# Patient Record
Sex: MALE | Race: WHITE | NOT HISPANIC OR LATINO | Employment: OTHER | URBAN - METROPOLITAN AREA
[De-identification: names, ages, dates, MRNs, and addresses within clinical notes are randomized per-mention and may not be internally consistent; named-entity substitution may affect disease eponyms.]

---

## 2019-05-24 ENCOUNTER — OFFICE VISIT CONVERTED (OUTPATIENT)
Dept: CARDIOLOGY | Facility: CLINIC | Age: 64
End: 2019-05-24
Attending: SPECIALIST

## 2019-05-24 ENCOUNTER — CONVERSION ENCOUNTER (OUTPATIENT)
Dept: CARDIOLOGY | Facility: CLINIC | Age: 64
End: 2019-05-24

## 2019-09-30 ENCOUNTER — HOSPITAL ENCOUNTER (INPATIENT)
Facility: HOSPITAL | Age: 64
End: 2019-09-30
Attending: INTERNAL MEDICINE | Admitting: INTERNAL MEDICINE

## 2019-09-30 ENCOUNTER — HOSPITAL ENCOUNTER (INPATIENT)
Facility: HOSPITAL | Age: 64
LOS: 3 days | Discharge: HOME HEALTH | End: 2019-10-03
Attending: INTERNAL MEDICINE | Admitting: INTERNAL MEDICINE

## 2019-09-30 DIAGNOSIS — G45.9 TIA (TRANSIENT ISCHEMIC ATTACK): Primary | ICD-10-CM

## 2019-09-30 DIAGNOSIS — E11.65 TYPE 2 DIABETES MELLITUS WITH HYPERGLYCEMIA, WITHOUT LONG-TERM CURRENT USE OF INSULIN (HCC): Chronic | ICD-10-CM

## 2019-09-30 DIAGNOSIS — R51.9 LEFT TEMPORAL HEADACHE: ICD-10-CM

## 2019-09-30 PROBLEM — E11.9 DIABETES MELLITUS (HCC): Status: ACTIVE | Noted: 2019-09-30

## 2019-09-30 PROBLEM — R27.0 ATAXIA: Status: ACTIVE | Noted: 2019-09-30

## 2019-09-30 PROBLEM — Z98.890 HISTORY OF CAROTID ENDARTERECTOMY: Status: ACTIVE | Noted: 2019-09-30

## 2019-09-30 PROBLEM — I50.9 CHF (CONGESTIVE HEART FAILURE) (HCC): Status: ACTIVE | Noted: 2019-09-30

## 2019-09-30 PROBLEM — I63.9 STROKE (HCC): Status: ACTIVE | Noted: 2019-09-30

## 2019-09-30 LAB
ALBUMIN SERPL-MCNC: 3.6 G/DL (ref 3.5–5.2)
ALBUMIN/GLOB SERPL: 1.2 G/DL
ALP SERPL-CCNC: 87 U/L (ref 39–117)
ALT SERPL W P-5'-P-CCNC: 8 U/L (ref 1–41)
ANION GAP SERPL CALCULATED.3IONS-SCNC: 10 MMOL/L (ref 5–15)
AST SERPL-CCNC: 15 U/L (ref 1–40)
BILIRUB SERPL-MCNC: 0.3 MG/DL (ref 0.2–1.2)
BUN BLD-MCNC: 11 MG/DL (ref 8–23)
BUN/CREAT SERPL: 12.5 (ref 7–25)
CALCIUM SPEC-SCNC: 8.2 MG/DL (ref 8.6–10.5)
CHLORIDE SERPL-SCNC: 99 MMOL/L (ref 98–107)
CO2 SERPL-SCNC: 29 MMOL/L (ref 22–29)
CREAT BLD-MCNC: 0.88 MG/DL (ref 0.76–1.27)
CRP SERPL-MCNC: 0.12 MG/DL (ref 0–0.5)
DEPRECATED RDW RBC AUTO: 45.7 FL (ref 37–54)
ERYTHROCYTE [DISTWIDTH] IN BLOOD BY AUTOMATED COUNT: 13.4 % (ref 12.3–15.4)
ERYTHROCYTE [SEDIMENTATION RATE] IN BLOOD: 12 MM/HR (ref 0–20)
GFR SERPL CREATININE-BSD FRML MDRD: 87 ML/MIN/1.73
GLOBULIN UR ELPH-MCNC: 2.9 GM/DL
GLUCOSE BLD-MCNC: 206 MG/DL (ref 65–99)
GLUCOSE BLDC GLUCOMTR-MCNC: 213 MG/DL (ref 70–130)
GLUCOSE BLDC GLUCOMTR-MCNC: 230 MG/DL (ref 70–130)
HCT VFR BLD AUTO: 42.5 % (ref 37.5–51)
HGB BLD-MCNC: 14.3 G/DL (ref 13–17.7)
INR PPP: 1.02 (ref 0.9–1.1)
MCH RBC QN AUTO: 31 PG (ref 26.6–33)
MCHC RBC AUTO-ENTMCNC: 33.6 G/DL (ref 31.5–35.7)
MCV RBC AUTO: 92 FL (ref 79–97)
PLATELET # BLD AUTO: 136 10*3/MM3 (ref 140–450)
PMV BLD AUTO: 10.4 FL (ref 6–12)
POTASSIUM BLD-SCNC: 4.1 MMOL/L (ref 3.5–5.2)
PROT SERPL-MCNC: 6.5 G/DL (ref 6–8.5)
PROTHROMBIN TIME: 13.1 SECONDS (ref 11.7–14.2)
RBC # BLD AUTO: 4.62 10*6/MM3 (ref 4.14–5.8)
SODIUM BLD-SCNC: 138 MMOL/L (ref 136–145)
WBC NRBC COR # BLD: 9.73 10*3/MM3 (ref 3.4–10.8)

## 2019-09-30 PROCEDURE — 86140 C-REACTIVE PROTEIN: CPT | Performed by: INTERNAL MEDICINE

## 2019-09-30 PROCEDURE — G0378 HOSPITAL OBSERVATION PER HR: HCPCS

## 2019-09-30 PROCEDURE — 82962 GLUCOSE BLOOD TEST: CPT

## 2019-09-30 PROCEDURE — 85610 PROTHROMBIN TIME: CPT | Performed by: INTERNAL MEDICINE

## 2019-09-30 PROCEDURE — 93005 ELECTROCARDIOGRAM TRACING: CPT | Performed by: INTERNAL MEDICINE

## 2019-09-30 PROCEDURE — 99205 OFFICE O/P NEW HI 60 MIN: CPT | Performed by: PSYCHIATRY & NEUROLOGY

## 2019-09-30 PROCEDURE — 63710000001 PREDNISONE PER 5 MG: Performed by: PSYCHIATRY & NEUROLOGY

## 2019-09-30 PROCEDURE — 85652 RBC SED RATE AUTOMATED: CPT | Performed by: INTERNAL MEDICINE

## 2019-09-30 PROCEDURE — 80053 COMPREHEN METABOLIC PANEL: CPT | Performed by: INTERNAL MEDICINE

## 2019-09-30 PROCEDURE — 93010 ELECTROCARDIOGRAM REPORT: CPT | Performed by: INTERNAL MEDICINE

## 2019-09-30 PROCEDURE — 85027 COMPLETE CBC AUTOMATED: CPT | Performed by: INTERNAL MEDICINE

## 2019-09-30 RX ORDER — DULOXETIN HYDROCHLORIDE 60 MG/1
60 CAPSULE, DELAYED RELEASE ORAL NIGHTLY
Status: DISCONTINUED | OUTPATIENT
Start: 2019-09-30 | End: 2019-10-03 | Stop reason: HOSPADM

## 2019-09-30 RX ORDER — ASPIRIN 300 MG/1
300 SUPPOSITORY RECTAL DAILY
Status: DISCONTINUED | OUTPATIENT
Start: 2019-10-01 | End: 2019-10-02

## 2019-09-30 RX ORDER — GABAPENTIN 400 MG/1
400 CAPSULE ORAL 3 TIMES DAILY
COMMUNITY

## 2019-09-30 RX ORDER — ACETAMINOPHEN 325 MG/1
650 TABLET ORAL EVERY 4 HOURS PRN
Status: DISCONTINUED | OUTPATIENT
Start: 2019-09-30 | End: 2019-10-03 | Stop reason: HOSPADM

## 2019-09-30 RX ORDER — ACETAMINOPHEN 650 MG/1
650 SUPPOSITORY RECTAL EVERY 4 HOURS PRN
Status: DISCONTINUED | OUTPATIENT
Start: 2019-09-30 | End: 2019-10-03 | Stop reason: HOSPADM

## 2019-09-30 RX ORDER — ONDANSETRON 2 MG/ML
4 INJECTION INTRAMUSCULAR; INTRAVENOUS EVERY 6 HOURS PRN
Status: DISCONTINUED | OUTPATIENT
Start: 2019-09-30 | End: 2019-10-03 | Stop reason: HOSPADM

## 2019-09-30 RX ORDER — SODIUM CHLORIDE 0.9 % (FLUSH) 0.9 %
10 SYRINGE (ML) INJECTION EVERY 12 HOURS SCHEDULED
Status: DISCONTINUED | OUTPATIENT
Start: 2019-09-30 | End: 2019-10-03 | Stop reason: HOSPADM

## 2019-09-30 RX ORDER — GABAPENTIN 600 MG/1
400 TABLET ORAL 3 TIMES DAILY
Status: ON HOLD | COMMUNITY
End: 2019-09-30

## 2019-09-30 RX ORDER — ATORVASTATIN CALCIUM 80 MG/1
80 TABLET, FILM COATED ORAL NIGHTLY
Status: DISCONTINUED | OUTPATIENT
Start: 2019-09-30 | End: 2019-10-03 | Stop reason: HOSPADM

## 2019-09-30 RX ORDER — ASPIRIN 81 MG/1
81 TABLET, CHEWABLE ORAL DAILY
Status: DISCONTINUED | OUTPATIENT
Start: 2019-10-01 | End: 2019-10-02

## 2019-09-30 RX ORDER — OXYCODONE AND ACETAMINOPHEN 7.5; 325 MG/1; MG/1
1 TABLET ORAL 2 TIMES DAILY
Status: DISCONTINUED | OUTPATIENT
Start: 2019-09-30 | End: 2019-10-03 | Stop reason: HOSPADM

## 2019-09-30 RX ORDER — FAMOTIDINE 20 MG/1
20 TABLET, FILM COATED ORAL
Status: DISCONTINUED | OUTPATIENT
Start: 2019-09-30 | End: 2019-10-03 | Stop reason: HOSPADM

## 2019-09-30 RX ORDER — NICOTINE POLACRILEX 4 MG
15 LOZENGE BUCCAL
Status: DISCONTINUED | OUTPATIENT
Start: 2019-09-30 | End: 2019-10-03 | Stop reason: HOSPADM

## 2019-09-30 RX ORDER — OXYCODONE AND ACETAMINOPHEN 7.5; 325 MG/1; MG/1
1 TABLET ORAL 2 TIMES DAILY
COMMUNITY

## 2019-09-30 RX ORDER — GABAPENTIN 400 MG/1
400 CAPSULE ORAL 3 TIMES DAILY
Status: DISCONTINUED | OUTPATIENT
Start: 2019-09-30 | End: 2019-10-03 | Stop reason: HOSPADM

## 2019-09-30 RX ORDER — SODIUM CHLORIDE 0.9 % (FLUSH) 0.9 %
10 SYRINGE (ML) INJECTION AS NEEDED
Status: DISCONTINUED | OUTPATIENT
Start: 2019-09-30 | End: 2019-10-03 | Stop reason: HOSPADM

## 2019-09-30 RX ORDER — DEXTROSE MONOHYDRATE 25 G/50ML
25 INJECTION, SOLUTION INTRAVENOUS
Status: DISCONTINUED | OUTPATIENT
Start: 2019-09-30 | End: 2019-10-03 | Stop reason: HOSPADM

## 2019-09-30 RX ORDER — DULOXETIN HYDROCHLORIDE 60 MG/1
60 CAPSULE, DELAYED RELEASE ORAL NIGHTLY
COMMUNITY

## 2019-09-30 RX ADMIN — SODIUM CHLORIDE, PRESERVATIVE FREE 10 ML: 5 INJECTION INTRAVENOUS at 20:09

## 2019-09-30 RX ADMIN — GABAPENTIN 400 MG: 400 CAPSULE ORAL at 22:21

## 2019-09-30 RX ADMIN — FAMOTIDINE 20 MG: 20 TABLET, FILM COATED ORAL at 20:09

## 2019-09-30 RX ADMIN — ATORVASTATIN CALCIUM 80 MG: 80 TABLET, FILM COATED ORAL at 20:09

## 2019-09-30 RX ADMIN — OXYCODONE HYDROCHLORIDE AND ACETAMINOPHEN 1 TABLET: 7.5; 325 TABLET ORAL at 22:21

## 2019-09-30 RX ADMIN — PREDNISONE 60 MG: 50 TABLET ORAL at 20:09

## 2019-09-30 RX ADMIN — ACETAMINOPHEN 650 MG: 325 TABLET, FILM COATED ORAL at 20:14

## 2019-10-01 ENCOUNTER — APPOINTMENT (OUTPATIENT)
Dept: MRI IMAGING | Facility: HOSPITAL | Age: 64
End: 2019-10-01

## 2019-10-01 ENCOUNTER — APPOINTMENT (OUTPATIENT)
Dept: CARDIOLOGY | Facility: HOSPITAL | Age: 64
End: 2019-10-01

## 2019-10-01 ENCOUNTER — APPOINTMENT (OUTPATIENT)
Dept: CT IMAGING | Facility: HOSPITAL | Age: 64
End: 2019-10-01

## 2019-10-01 PROBLEM — I63.81 ACUTE LACUNAR STROKE (HCC): Status: ACTIVE | Noted: 2019-10-01

## 2019-10-01 PROBLEM — R51.9 LEFT TEMPORAL HEADACHE: Status: ACTIVE | Noted: 2019-10-01

## 2019-10-01 PROBLEM — K43.9 HERNIA OF ANTERIOR ABDOMINAL WALL: Status: ACTIVE | Noted: 2019-10-01

## 2019-10-01 PROBLEM — E83.51 HYPOCALCEMIA: Status: ACTIVE | Noted: 2019-10-01

## 2019-10-01 PROBLEM — E53.8 B12 DEFICIENCY: Status: ACTIVE | Noted: 2019-10-01

## 2019-10-01 PROBLEM — I25.10 CAD (CORONARY ARTERY DISEASE): Chronic | Status: ACTIVE | Noted: 2019-10-01

## 2019-10-01 PROBLEM — E11.65 TYPE 2 DIABETES MELLITUS WITH HYPERGLYCEMIA, WITHOUT LONG-TERM CURRENT USE OF INSULIN (HCC): Chronic | Status: ACTIVE | Noted: 2019-10-01

## 2019-10-01 LAB
AORTIC DIMENSIONLESS INDEX: 1 (DI)
BH CV ECHO MEAS - AO MAX PG: 5.4 MMHG
BH CV ECHO MEAS - AO MEAN PG (FULL): 1 MMHG
BH CV ECHO MEAS - AO MEAN PG: 3 MMHG
BH CV ECHO MEAS - AO ROOT AREA (BSA CORRECTED): 1.7
BH CV ECHO MEAS - AO ROOT AREA: 9.6 CM^2
BH CV ECHO MEAS - AO ROOT DIAM: 3.5 CM
BH CV ECHO MEAS - AO V2 MAX: 115 CM/SEC
BH CV ECHO MEAS - AO V2 MEAN: 78.7 CM/SEC
BH CV ECHO MEAS - AO V2 VTI: 20.2 CM
BH CV ECHO MEAS - AVA(I,A): 3.6 CM^2
BH CV ECHO MEAS - AVA(I,D): 3.6 CM^2
BH CV ECHO MEAS - BSA(HAYCOCK): 2.1 M^2
BH CV ECHO MEAS - BSA: 2 M^2
BH CV ECHO MEAS - BZI_BMI: 27.8 KILOGRAMS/M^2
BH CV ECHO MEAS - BZI_METRIC_HEIGHT: 177 CM
BH CV ECHO MEAS - BZI_METRIC_WEIGHT: 87 KG
BH CV ECHO MEAS - EDV(CUBED): 68.9 ML
BH CV ECHO MEAS - EDV(MOD-SP2): 25 ML
BH CV ECHO MEAS - EDV(MOD-SP4): 51 ML
BH CV ECHO MEAS - EDV(TEICH): 74.2 ML
BH CV ECHO MEAS - EF(CUBED): 56.8 %
BH CV ECHO MEAS - EF(MOD-BP): 56 %
BH CV ECHO MEAS - EF(MOD-SP2): 56 %
BH CV ECHO MEAS - EF(MOD-SP4): 58.8 %
BH CV ECHO MEAS - EF(TEICH): 48.9 %
BH CV ECHO MEAS - ESV(CUBED): 29.8 ML
BH CV ECHO MEAS - ESV(MOD-SP2): 11 ML
BH CV ECHO MEAS - ESV(MOD-SP4): 21 ML
BH CV ECHO MEAS - ESV(TEICH): 37.9 ML
BH CV ECHO MEAS - FS: 24.4 %
BH CV ECHO MEAS - IVS/LVPW: 0.92
BH CV ECHO MEAS - IVSD: 1.1 CM
BH CV ECHO MEAS - LAT PEAK E' VEL: 12.9 CM/SEC
BH CV ECHO MEAS - LV DIASTOLIC VOL/BSA (35-75): 25 ML/M^2
BH CV ECHO MEAS - LV MASS(C)D: 161.4 GRAMS
BH CV ECHO MEAS - LV MASS(C)DI: 79 GRAMS/M^2
BH CV ECHO MEAS - LV MAX PG: 4.2 MMHG
BH CV ECHO MEAS - LV MEAN PG: 2 MMHG
BH CV ECHO MEAS - LV SYSTOLIC VOL/BSA (12-30): 10.3 ML/M^2
BH CV ECHO MEAS - LV V1 MAX: 103 CM/SEC
BH CV ECHO MEAS - LV V1 MEAN: 66.6 CM/SEC
BH CV ECHO MEAS - LV V1 VTI: 20.8 CM
BH CV ECHO MEAS - LVIDD: 4.1 CM
BH CV ECHO MEAS - LVIDS: 3.1 CM
BH CV ECHO MEAS - LVLD AP2: 6.6 CM
BH CV ECHO MEAS - LVLD AP4: 7.8 CM
BH CV ECHO MEAS - LVLS AP2: 5.1 CM
BH CV ECHO MEAS - LVLS AP4: 6.5 CM
BH CV ECHO MEAS - LVOT AREA (M): 3.5 CM^2
BH CV ECHO MEAS - LVOT AREA: 3.5 CM^2
BH CV ECHO MEAS - LVOT DIAM: 2.1 CM
BH CV ECHO MEAS - LVPWD: 1.2 CM
BH CV ECHO MEAS - MED PEAK E' VEL: 6.8 CM/SEC
BH CV ECHO MEAS - MV A DUR: 134 SEC
BH CV ECHO MEAS - MV A MAX VEL: 111 CM/SEC
BH CV ECHO MEAS - MV DEC SLOPE: 401 CM/SEC^2
BH CV ECHO MEAS - MV DEC TIME: 148 SEC
BH CV ECHO MEAS - MV E MAX VEL: 83.9 CM/SEC
BH CV ECHO MEAS - MV E/A: 0.76
BH CV ECHO MEAS - MV MEAN PG: 1 MMHG
BH CV ECHO MEAS - MV P1/2T MAX VEL: 80.8 CM/SEC
BH CV ECHO MEAS - MV P1/2T: 59 MSEC
BH CV ECHO MEAS - MV V2 MEAN: 53.5 CM/SEC
BH CV ECHO MEAS - MV V2 VTI: 18.6 CM
BH CV ECHO MEAS - MVA P1/2T LCG: 2.7 CM^2
BH CV ECHO MEAS - MVA(P1/2T): 3.7 CM^2
BH CV ECHO MEAS - MVA(VTI): 3.9 CM^2
BH CV ECHO MEAS - PA ACC SLOPE: 734 CM/SEC^2
BH CV ECHO MEAS - PA ACC TIME: 0.11 SEC
BH CV ECHO MEAS - PA MAX PG (FULL): 3 MMHG
BH CV ECHO MEAS - PA MAX PG: 5.2 MMHG
BH CV ECHO MEAS - PA PR(ACCEL): 31.3 MMHG
BH CV ECHO MEAS - PA V2 MAX: 114 CM/SEC
BH CV ECHO MEAS - PVA(V,A): 1.6 CM^2
BH CV ECHO MEAS - PVA(V,D): 1.6 CM^2
BH CV ECHO MEAS - QP/QS: 0.41
BH CV ECHO MEAS - RV MAX PG: 2.2 MMHG
BH CV ECHO MEAS - RV MEAN PG: 1 MMHG
BH CV ECHO MEAS - RV V1 MAX: 73.7 CM/SEC
BH CV ECHO MEAS - RV V1 MEAN: 45.7 CM/SEC
BH CV ECHO MEAS - RV V1 VTI: 11.7 CM
BH CV ECHO MEAS - RVOT AREA: 2.5 CM^2
BH CV ECHO MEAS - RVOT DIAM: 1.8 CM
BH CV ECHO MEAS - SI(AO): 95.1 ML/M^2
BH CV ECHO MEAS - SI(CUBED): 19.1 ML/M^2
BH CV ECHO MEAS - SI(LVOT): 35.2 ML/M^2
BH CV ECHO MEAS - SI(MOD-SP2): 6.8 ML/M^2
BH CV ECHO MEAS - SI(MOD-SP4): 14.7 ML/M^2
BH CV ECHO MEAS - SI(TEICH): 17.8 ML/M^2
BH CV ECHO MEAS - SV(AO): 194.3 ML
BH CV ECHO MEAS - SV(CUBED): 39.1 ML
BH CV ECHO MEAS - SV(LVOT): 72 ML
BH CV ECHO MEAS - SV(MOD-SP2): 14 ML
BH CV ECHO MEAS - SV(MOD-SP4): 30 ML
BH CV ECHO MEAS - SV(RVOT): 29.8 ML
BH CV ECHO MEAS - SV(TEICH): 36.3 ML
BH CV ECHO MEAS - TAPSE (>1.6): 1.7 CM2
BH CV ECHO MEASUREMENTS AVERAGE E/E' RATIO: 8.52
BH CV XLRA - RV BASE: 3.2 CM
BH CV XLRA - TDI S': 12.5 CM/SEC
CHOLEST SERPL-MCNC: 226 MG/DL (ref 0–200)
GLUCOSE BLDC GLUCOMTR-MCNC: 279 MG/DL (ref 70–130)
GLUCOSE BLDC GLUCOMTR-MCNC: 320 MG/DL (ref 70–130)
GLUCOSE BLDC GLUCOMTR-MCNC: 335 MG/DL (ref 70–130)
GLUCOSE BLDC GLUCOMTR-MCNC: 336 MG/DL (ref 70–130)
HBA1C MFR BLD: 9.5 % (ref 4.8–5.6)
HDLC SERPL-MCNC: 41 MG/DL (ref 40–60)
LDLC SERPL CALC-MCNC: 156 MG/DL (ref 0–100)
LDLC/HDLC SERPL: 3.81 {RATIO}
LEFT ATRIUM VOLUME INDEX: 20.6 ML/M2
MAXIMAL PREDICTED HEART RATE: 155 BPM
STRESS TARGET HR: 132 BPM
TRIGL SERPL-MCNC: 144 MG/DL (ref 0–150)
TSH SERPL DL<=0.05 MIU/L-ACNC: 0.66 UIU/ML (ref 0.27–4.2)
VIT B12 BLD-MCNC: 314 PG/ML (ref 211–946)
VLDLC SERPL-MCNC: 28.8 MG/DL (ref 5–40)

## 2019-10-01 PROCEDURE — 93306 TTE W/DOPPLER COMPLETE: CPT | Performed by: INTERNAL MEDICINE

## 2019-10-01 PROCEDURE — 25010000002 PERFLUTREN (DEFINITY) 8.476 MG IN SODIUM CHLORIDE 0.9 % 10 ML INJECTION: Performed by: INTERNAL MEDICINE

## 2019-10-01 PROCEDURE — 97110 THERAPEUTIC EXERCISES: CPT

## 2019-10-01 PROCEDURE — 70498 CT ANGIOGRAPHY NECK: CPT

## 2019-10-01 PROCEDURE — 93306 TTE W/DOPPLER COMPLETE: CPT

## 2019-10-01 PROCEDURE — 70496 CT ANGIOGRAPHY HEAD: CPT

## 2019-10-01 PROCEDURE — 97162 PT EVAL MOD COMPLEX 30 MIN: CPT

## 2019-10-01 PROCEDURE — 25010000002 CYANOCOBALAMIN PER 1000 MCG: Performed by: HOSPITALIST

## 2019-10-01 PROCEDURE — 84443 ASSAY THYROID STIM HORMONE: CPT | Performed by: NURSE PRACTITIONER

## 2019-10-01 PROCEDURE — 80061 LIPID PANEL: CPT | Performed by: INTERNAL MEDICINE

## 2019-10-01 PROCEDURE — 97165 OT EVAL LOW COMPLEX 30 MIN: CPT

## 2019-10-01 PROCEDURE — 82962 GLUCOSE BLOOD TEST: CPT

## 2019-10-01 PROCEDURE — 25010000002 IOPAMIDOL 61 % SOLUTION: Performed by: HOSPITALIST

## 2019-10-01 PROCEDURE — 97535 SELF CARE MNGMENT TRAINING: CPT

## 2019-10-01 PROCEDURE — 82607 VITAMIN B-12: CPT | Performed by: NURSE PRACTITIONER

## 2019-10-01 PROCEDURE — 63710000001 INSULIN LISPRO (HUMAN) PER 5 UNITS: Performed by: HOSPITALIST

## 2019-10-01 PROCEDURE — 63710000001 INSULIN LISPRO (HUMAN) PER 5 UNITS: Performed by: INTERNAL MEDICINE

## 2019-10-01 PROCEDURE — 99214 OFFICE O/P EST MOD 30 MIN: CPT | Performed by: NURSE PRACTITIONER

## 2019-10-01 PROCEDURE — 0 GADOBENATE DIMEGLUMINE 529 MG/ML SOLUTION: Performed by: INTERNAL MEDICINE

## 2019-10-01 PROCEDURE — 70553 MRI BRAIN STEM W/O & W/DYE: CPT

## 2019-10-01 PROCEDURE — A9577 INJ MULTIHANCE: HCPCS | Performed by: INTERNAL MEDICINE

## 2019-10-01 PROCEDURE — 83036 HEMOGLOBIN GLYCOSYLATED A1C: CPT | Performed by: INTERNAL MEDICINE

## 2019-10-01 PROCEDURE — 63710000001 PREDNISONE PER 5 MG: Performed by: PSYCHIATRY & NEUROLOGY

## 2019-10-01 RX ORDER — CYANOCOBALAMIN 1000 UG/ML
1000 INJECTION, SOLUTION INTRAMUSCULAR; SUBCUTANEOUS DAILY
Status: COMPLETED | OUTPATIENT
Start: 2019-10-01 | End: 2019-10-03

## 2019-10-01 RX ADMIN — OXYCODONE HYDROCHLORIDE AND ACETAMINOPHEN 1 TABLET: 7.5; 325 TABLET ORAL at 21:06

## 2019-10-01 RX ADMIN — SODIUM CHLORIDE, PRESERVATIVE FREE 10 ML: 5 INJECTION INTRAVENOUS at 08:15

## 2019-10-01 RX ADMIN — ACETAMINOPHEN 650 MG: 325 TABLET, FILM COATED ORAL at 15:35

## 2019-10-01 RX ADMIN — ASPIRIN 81 MG: 81 TABLET, CHEWABLE ORAL at 08:15

## 2019-10-01 RX ADMIN — IOPAMIDOL 95 ML: 612 INJECTION, SOLUTION INTRAVENOUS at 18:11

## 2019-10-01 RX ADMIN — DULOXETINE HYDROCHLORIDE 60 MG: 60 CAPSULE, DELAYED RELEASE ORAL at 21:07

## 2019-10-01 RX ADMIN — FAMOTIDINE 20 MG: 20 TABLET, FILM COATED ORAL at 08:15

## 2019-10-01 RX ADMIN — PREDNISONE 60 MG: 50 TABLET ORAL at 08:15

## 2019-10-01 RX ADMIN — SODIUM CHLORIDE, PRESERVATIVE FREE 10 ML: 5 INJECTION INTRAVENOUS at 21:26

## 2019-10-01 RX ADMIN — OXYCODONE HYDROCHLORIDE AND ACETAMINOPHEN 1 TABLET: 7.5; 325 TABLET ORAL at 08:15

## 2019-10-01 RX ADMIN — GABAPENTIN 400 MG: 400 CAPSULE ORAL at 08:15

## 2019-10-01 RX ADMIN — INSULIN LISPRO 2 UNITS: 100 INJECTION, SOLUTION INTRAVENOUS; SUBCUTANEOUS at 13:14

## 2019-10-01 RX ADMIN — INSULIN LISPRO 7 UNITS: 100 INJECTION, SOLUTION INTRAVENOUS; SUBCUTANEOUS at 08:15

## 2019-10-01 RX ADMIN — INSULIN LISPRO 10 UNITS: 100 INJECTION, SOLUTION INTRAVENOUS; SUBCUTANEOUS at 18:01

## 2019-10-01 RX ADMIN — FAMOTIDINE 20 MG: 20 TABLET, FILM COATED ORAL at 18:01

## 2019-10-01 RX ADMIN — GABAPENTIN 400 MG: 400 CAPSULE ORAL at 21:07

## 2019-10-01 RX ADMIN — GADOBENATE DIMEGLUMINE 18 ML: 529 INJECTION, SOLUTION INTRAVENOUS at 07:19

## 2019-10-01 RX ADMIN — PERFLUTREN 2 ML: 6.52 INJECTION, SUSPENSION INTRAVENOUS at 14:20

## 2019-10-01 RX ADMIN — GABAPENTIN 400 MG: 400 CAPSULE ORAL at 15:35

## 2019-10-01 RX ADMIN — ATORVASTATIN CALCIUM 80 MG: 80 TABLET, FILM COATED ORAL at 21:07

## 2019-10-01 RX ADMIN — INSULIN LISPRO 10 UNITS: 100 INJECTION, SOLUTION INTRAVENOUS; SUBCUTANEOUS at 23:22

## 2019-10-01 RX ADMIN — CYANOCOBALAMIN 1000 MCG: 1000 INJECTION, SOLUTION INTRAMUSCULAR; SUBCUTANEOUS at 21:07

## 2019-10-01 NOTE — H&P
Internal medicine history and physical  INTERNAL MEDICINE   [facility]       Patient Identification:  Name: Ramiro Maradiaga  Age: 64 y.o.  Sex: male  :  1955  MRN: [id number]                   Primary Care Physician: Heidi Jalloh APRN                                   Chief Complaint: Felt dizzy very suddenly while sitting and soaking his feet last evening.  Woke up this morning felt the same way but this time had difficulty speaking and associated weakness in his left leg.    History of Present Illness:   Patient is a 64-year-old male with past medical history of stroke that has affected his right side with resolution of weakness has had history of carotid endarterectomy as well as migraine headaches.  He was feeling fine yesterday evening and sitting at home soaking his feet in the warm water and suddenly felt as if he is going to fall on his left side.  He was sitting at that time.  He elevated the fall by controlling himself and in that process sat on the floor in the pan of water he is using to soak his feet was splattered all over the floor.  He attempted to get up to go lay down in the bed because he felt awful but he could not do it safely and was very unsteady.  He laid down in the bed for some time felt somewhat better attempted to get up and felt the same way.  He was able to eat something and then decided to retire early and went to bed.  He woke up this morning felt left-sided weakness and when he attempted to speak could not speak very well.  Words were not coming out.  This along with left-sided weakness and the dizziness got him concerned so he decided to go to [facility] where he had extensive work-up done including CT scan of the head without contrast which did not show any acute intracranial abnormality chest x-ray which did not show any acute infiltrate and MRI of his brain which did not show any acute infarct.  Patient's case was discussed with  neurology on-call and patient was accepted in transfer to our facility for further care.  According to the patient his inability to speak very well lasted for about 6 hours and begin to resolve while he was at the emergency room at the [facility].  He is complaining of off-and-on headaches and discomfort is mainly on the left side of the head as well as behind his left eye.  He has some blurry vision.      Past Medical History:  Past Medical History:   Diagnosis Date   • Arthritis    • CHF (congestive heart failure) (CMS/[facility])    • Coronary artery disease    • Diabetes mellitus (CMS/[facility])    • Fracture cervical vertebra-closed (CMS/[facility])    • Fracture of lumbar spine (CMS/[facility]) 2016   • Stroke (CMS/[facility])      Past Surgical History:  Past Surgical History:   Procedure Laterality Date   • BREAST LUMPECTOMY Right    • CARDIAC SURGERY      quadruple bypass,valve repair   • EYE SURGERY      bilateral cataract surgery   • SPINAL FIXATION SURGERY W/ IMPLANT N/A       Home Meds:  Medications Prior to Admission   Medication Sig Dispense Refill Last Dose   • DULoxetine (CYMBALTA) 60 MG capsule Take 60 mg by mouth Every Night.   9/29/2019 at Unknown time   • gabapentin (NEURONTIN) 400 MG capsule Take 400 mg by mouth 3 (Three) Times a Day.   9/29/2019 at Unknown time   • metFORMIN (GLUCOPHAGE) 500 MG tablet Take 500 mg by mouth 2 (Two) Times a Day With Meals.   9/29/2019 at Unknown time   • oxyCODONE-acetaminophen (PERCOCET) 7.5-325 MG per tablet Take 1 tablet by mouth 2 (Two) Times a Day.   9/29/2019 at Unknown time     Current Meds:     Current Facility-Administered Medications:   •  acetaminophen (TYLENOL) tablet 650 mg, 650 mg, Oral, Q4H PRN, 650 mg at 09/30/19 2014 **OR** acetaminophen (TYLENOL) suppository 650 mg, 650 mg, Rectal, Q4H PRN, Chang Hawkins MD  •  [START ON 10/1/2019] aspirin chewable tablet 81 mg, 81 mg, Oral, Daily **OR** [START ON 10/1/2019] aspirin suppository 300 mg, 300 mg, Rectal, Daily,  "Chang Hawkins MD  •  atorvastatin (LIPITOR) tablet 80 mg, 80 mg, Oral, Nightly, Chang Hawkins MD, 80 mg at 09/30/19 2009  •  famotidine (PEPCID) tablet 20 mg, 20 mg, Oral, BID AC, Ricardo Mead MD, 20 mg at 09/30/19 2009  •  ondansetron (ZOFRAN) injection 4 mg, 4 mg, Intravenous, Q6H PRN, Chang Hawkins MD  •  predniSONE (DELTASONE) tablet 60 mg, 60 mg, Oral, Daily, Ricardo Mead MD, 60 mg at 09/30/19 2009  •  sodium chloride 0.9 % flush 10 mL, 10 mL, Intravenous, Q12H, Chang Hawkins MD, 10 mL at 09/30/19 2009  •  sodium chloride 0.9 % flush 10 mL, 10 mL, Intravenous, PRN, Chang Hawkins MD  Allergies:  Allergies   Allergen Reactions   • Elavil [Amitriptyline Hcl] Rash     Social History:   Social History     Tobacco Use   • Smoking status: Never Smoker   • Smokeless tobacco: Never Used   Substance Use Topics   • Alcohol use: No     Frequency: Never      Family History:  Family History   Problem Relation Age of Onset   • Cancer Mother    • COPD Father           Review of Systems  See history of present illness and past medical history.   Constitutional: Remarkable for no fever or chills  Cardiovascular: Remarkable for no chest pain or shortness of breath  GI: Unremarkable for slight nausea but no vomiting or abdominal pain  : Remarkable for no burning urination frequency urgency  Muscular skeletal: Remarkable for no specific joint aches and pain except for chronic back pain  Neurological: Remarkable for what has been described in the history presenting illness remainder of ROS is negative.      Vitals:   /95 (BP Location: Right arm, Patient Position: Lying)   Pulse 76   Temp 97.8 °F (36.6 °C) (Oral)   Resp 16   Ht 177.8 cm (70\")   Wt 87.1 kg (192 lb)   SpO2 95%   BMI 27.55 kg/m²   I/O:     Intake/Output Summary (Last 24 hours) at 9/30/2019 2122  Last data filed at 9/30/2019 2017  Gross per 24 hour   Intake 500 ml   Output 350 ml   Net 150 ml "     Exam:  General Appearance:    Alert, cooperative, no distress, appears stated age   Head:    Normocephalic, without obvious abnormality, atraumatic   Eyes:    PERRL, conjunctiva/corneas clear, EOM's intact, both eyes   Ears:    Normal external ear canals, both ears   Nose:   Nares normal, septum midline, mucosa normal, no drainage    or sinus tenderness   Throat:   Lips, tongue, gums normal; oral mucosa pink and moist   Neck:   Supple, symmetrical, trachea midline, no adenopathy;     thyroid:  no enlargement/tenderness/nodules; no carotid    bruit or JVD   Back:     Symmetric, no curvature, ROM normal, no CVA tenderness   Lungs:     Clear to auscultation bilaterally, respirations unlabored   Chest Wall:    No tenderness or deformity    Heart:    Regular rate and rhythm, S1 and S2 normal, no murmur, rub   or gallop   Abdomen:     Soft, non-tender, bowel sounds active all four quadrants,     no masses, no hepatomegaly, no splenomegaly   Extremities:   Extremities normal, atraumatic, no cyanosis or edema   Pulses:   Pulses palpable in all extremities; symmetric all extremities   Skin:   Skin color normal, Skin is warm and dry,  no rashes or palpable lesions   Neurologic:  Grossly nonfocal with some decreased sensation on the left side.       Data Review:      I reviewed the patient's new clinical results.  Results from last 7 days   Lab Units 09/30/19  1722   WBC 10*3/mm3 9.73   HEMOGLOBIN g/dL 14.3   PLATELETS 10*3/mm3 136*     Results from last 7 days   Lab Units 09/30/19  1723   SODIUM mmol/L 138   POTASSIUM mmol/L 4.1   CHLORIDE mmol/L 99   CO2 mmol/L 29.0   BUN mg/dL 11   CREATININE mg/dL 0.88   CALCIUM mg/dL 8.2*   GLUCOSE mg/dL 206*   Lab data at the outside facility reviewed imaging studies results as mentioned in the history of presenting illness.  His CBC shows WBC 8.15 hemoglobin 15.4 and platelet 145 his INR is 0.99 his chemistry shows glucose 258 BUN 11 creatinine 1.02 sodium 137 potassium 4.5 chloride  100 bicarb 26 his LFTs within normal limits troponin is less than 0.04 sed rate is 10.  ECG 12 Lead   Preliminary Result   HEART RATE= 79  bpm   RR Interval= 756  ms   CA Interval= 160  ms   P Horizontal Axis= 17  deg   P Front Axis= 47  deg   QRSD Interval= 93  ms   QT Interval= 392  ms   QRS Axis= -12  deg   T Wave Axis= 120  deg   - ABNORMAL ECG -   Sinus rhythm   Nonspecific T abnormalities, lateral leads   Electronically Signed By:    Date and Time of Study: 2019-09-30 17:44:25          Assessment:  Active Hospital Problems    Diagnosis POA   • **TIA (transient ischemic attack) [G45.9] Yes   • Diabetes mellitus (CMS/HCC) [E11.9] Unknown   • CHF (congestive heart failure) (CMS/HCC) [I50.9] Unknown   • Stroke (CMS/HCC) - history of [I63.9] Unknown   • History of carotid endarterectomy [Z98.890] Not Applicable   • Ataxia [R27.0] Unknown       Medical decision making:  Acute ataxia associated with expressive aphasia and left-sided weakness and facial numbness in the background of known history of previous strokes carotid artery disease requiring endarterectomies and history of migraine along with other risk factors such as diabetes and hypertension.  Despite the fact that the initial work-up is negative patient condition is consistent with TIA with possible evolving stroke versus complicated migraine.  Patient has been seen by neurology service and repeat MRI is requested because of the quality of the MRI performed in the outpatient setting was considered to be suboptimal.  Patient is going to have CT angiogram and other imaging studies.  Patient is going to be continued with aspirin and told.  Diabetes mellitus-continue with Accu-Cheks and sliding scale coverage hold metformin check hemoglobin A1c.  History of congestive heart failure currently compensated continue his current regimen.  History of left carotid endarterectomy about a year ago-continue his current regimen of aspirin and statins.  Jeffery Alcaraz,  MD   9/30/2019  9:22 PM  Much of this encounter note is an electronic transcription/translation of spoken language to printed text. The electronic translation of spoken language may permit erroneous, or at times, nonsensical words or phrases to be inadvertently transcribed; Although I have reviewed the note for such errors, some may still exist

## 2019-10-01 NOTE — PLAN OF CARE
Problem: Patient Care Overview  Goal: Plan of Care Review  Outcome: Ongoing (interventions implemented as appropriate)   10/01/19 0857   Coping/Psychosocial   Plan of Care Reviewed With patient   Plan of Care Review   Progress improving   OTHER   Outcome Summary Pt agreeable to PT this am. He was admitted on 9/30/10 for stroke. Pt with hx of CVA and migraines. Pt reports some weakness and unsteadiness even at baseline. Today, pt presents with decreased balance, generalized weakness, and decreased functional mobility. Pt was able to ambulate approx 150 ft with CGA x2. He requires cue to  feet and increase B heel strike. He does have some unsteadiness, but not sure if this is anything new. Pt plans home at [place]. Will continue to follow pt for skilled PT to ensure safety and maximize independence with mobility.

## 2019-10-01 NOTE — PLAN OF CARE
Problem: Patient Care Overview  Goal: Plan of Care Review   10/01/19 1402   OTHER   Outcome Summary Patient off the floor at ECHO and CT will f/u 10/2

## 2019-10-01 NOTE — NURSING NOTE
Received referral through stroke order set. Based on therapy evals, no determined need for inpatient program. Will sign off. Thanks, Aundrea KINGSLEY rehab admission nurse [phone]

## 2019-10-01 NOTE — THERAPY EVALUATION
Patient Name: Ramiro Maradiaga  : 1955    MRN: [id number]                              Today's Date: 10/1/2019       Admit Date: 2019    Visit Dx: No diagnosis found.  Patient Active Problem List   Diagnosis   • TIA (transient ischemic attack)   • Diabetes mellitus (CMS/[facility])   • CHF (congestive heart failure) (CMS/[facility])   • Stroke (CMS/[facility]) - history of   • History of carotid endarterectomy   • Ataxia     Past Medical History:   Diagnosis Date   • Arthritis    • CHF (congestive heart failure) (CMS/[facility])    • Coronary artery disease    • Diabetes mellitus (CMS/[facility])    • Fracture cervical vertebra-closed (CMS/[facility])    • Fracture of lumbar spine (CMS/[facility]) 2016   • Stroke (CMS/[facility])      Past Surgical History:   Procedure Laterality Date   • BREAST LUMPECTOMY Right    • CARDIAC SURGERY      quadruple bypass,valve repair   • EYE SURGERY      bilateral cataract surgery   • SPINAL FIXATION SURGERY W/ IMPLANT N/A      General Information     Row Name 10/01/19 0851          PT Evaluation Time/Intention    Document Type  evaluation  -EJ     Mode of Treatment  physical therapy  -EJ     Row Name 10/01/19 0851          General Information    Patient Profile Reviewed?  yes  -EJ     Prior Level of Function  independent:;ADL's;all household mobility  -EJ     Existing Precautions/Restrictions  fall  -EJ     Barriers to Rehab  none identified  -EJ     Row Name 10/01/19 0851          Relationship/Environment    Lives With  other (see comments) states he lives with his cousin  -EJ     Row Name 10/01/19 0851          Resource/Environmental Concerns    Current Living Arrangements  home/apartment/condo  -EJ     Row Name 10/01/19 0851          Home Main Entrance    Number of Stairs, Main Entrance  none  -EJ     Row Name 10/01/19 0851          Cognitive Assessment/Intervention- PT/OT    Orientation Status (Cognition)  oriented x 3  -EJ     Row Name 10/01/19 0851          Safety Issues, Functional Mobility    Impairments Affecting  Function (Mobility)  balance;endurance/activity tolerance;pain;strength  -EJ       User Key  (r) = Recorded By, (t) = Taken By, (c) = Cosigned By    Initials Name Provider Type    Radha Busch, PT Physical Therapist        Mobility     Row Name 10/01/19 0853          Bed Mobility Assessment/Treatment    Bed Mobility Assessment/Treatment  supine-sit;sit-supine  -EJ     Supine-Sit Fleming (Bed Mobility)  supervision  -EJ     Sit-Supine Fleming (Bed Mobility)  supervision  -EJ     Assistive Device (Bed Mobility)  bed rails  -EJ     Row Name 10/01/19 0853          Sit-Stand Transfer    Sit-Stand Fleming (Transfers)  verbal cues;contact guard  -EJ     Row Name 10/01/19 0853          Gait/Stairs Assessment/Training    Gait/Stairs Assessment/Training  gait/ambulation independence  -EJ     Fleming Level (Gait)  verbal cues;contact guard  -EJ     Distance in Feet (Gait)  150  -EJ     Deviations/Abnormal Patterns (Gait)  missy decreased;stride length decreased  -EJ     Bilateral Gait Deviations  heel strike decreased  -EJ     Comment (Gait/Stairs)  slightly unsteady, no gross LOB, cues for increased B heel strike  -EJ       User Key  (r) = Recorded By, (t) = Taken By, (c) = Cosigned By    Initials Name Provider Type    Radha Busch, PT Physical Therapist        Obj/Interventions     Row Name 10/01/19 0855          General ROM    GENERAL ROM COMMENTS  WFL  -EJ     Row Name 10/01/19 0855          MMT (Manual Muscle Testing)    General MMT Comments  generalized baseline weakness  -EJ     Row Name 10/01/19 0855          Static Sitting Balance    Level of Fleming (Unsupported Sitting, Static Balance)  independent  -EJ     Row Name 10/01/19 0855          Dynamic Sitting Balance    Level of Fleming, Reaches Outside Midline (Sitting, Dynamic Balance)  independent  -EJ     Row Name 10/01/19 0855          Static Standing Balance    Level of [place] (Supported Standing, Static  Balance)  standby assist  -EJ     Row Name 10/01/19 0855          Dynamic Standing Balance    Level of [place], Reaches Outside Midline (Standing, Dynamic Balance)  contact guard assist  -EJ       User Key  (r) = Recorded By, (t) = Taken By, (c) = Cosigned By    Initials Name Provider Type    EJ Radha Kelley, PT Physical Therapist        Goals/Plan     Row Name 10/01/19 0856          Gait Training Goal 1 (PT)    Activity/Assistive Device (Gait Training Goal 1, PT)  gait (walking locomotion)  -EJ     [place] Level (Gait Training Goal 1, PT)  supervision required  -EJ     Distance (Gait Goal 1, PT)  300  -EJ     Time Frame (Gait Training Goal 1, PT)  1 week  -EJ       User Key  (r) = Recorded By, (t) = Taken By, (c) = Cosigned By    Initials Name Provider Type    EJ Radha Kelley, PT Physical Therapist        Clinical Impression     Row Name 10/01/19 0856          Pain Assessment    Additional Documentation  Pain Scale: Numbers Pre/Post-Treatment (Group)  -EJ     Row Name 10/01/19 0856          Pain Scale: Numbers Pre/Post-Treatment    Pain Scale: Numbers, Pretreatment  0/10 - no pain  -EJ     Pain Scale: Numbers, Post-Treatment  0/10 - no pain  -EJ     Row Name 10/01/19 0856          Plan of Care Review    Plan of Care Reviewed With  patient  -EJ     Row Name 10/01/19 0856          Physical Therapy Clinical Impression    Patient/Family Goals Statement (PT Clinical Impression)  Pt hopeful to go home at [place]  -EJ     Criteria for Skilled Interventions Met (PT Clinical Impression)  yes  -EJ     Rehab Potential (PT Clinical Summary)  good, to achieve stated therapy goals  -EJ     Row Name 10/01/19 0856          Vital Signs    O2 Delivery Pre Treatment  room air  -EJ     O2 Delivery Intra Treatment  room air  -EJ     O2 Delivery Post Treatment  room air  -EJ     Pre Patient Position  Supine  -EJ     Intra Patient Position  Standing  -EJ     Post Patient Position  Supine  -EJ     Row Name 10/01/19 0856           Positioning and Restraints    Pre-Treatment Position  in bed  -EJ     Post Treatment Position  bed  -EJ     In Bed  notified nsg;supine;call light within reach;encouraged to call for assist  -EJ       User Key  (r) = Recorded By, (t) = Taken By, (c) = Cosigned By    Initials Name Provider Type     Radha Kelley, PT Physical Therapist        Outcome Measures     Row Name 10/01/19 0901          How much help from another person do you currently need...    Turning from your back to your side while in flat bed without using bedrails?  4  -EJ     Moving from lying on back to sitting on the side of a flat bed without bedrails?  3  -EJ     Moving to and from a bed to a chair (including a wheelchair)?  3  -EJ     Standing up from a chair using your arms (e.g., wheelchair, bedside chair)?  3  -EJ     Climbing 3-5 steps with a railing?  3  -EJ     To walk in hospital room?  3  -EJ     AM-PAC 6 Clicks Score (PT)  19  -     Row Name 10/01/19 0901          Modified [place] Scale    Modified [place] Scale  3 - Moderate disability.  Requiring some help, but able to walk without assistance.  -     Row Name 10/01/19 0901          Functional Assessment    Outcome Measure Options  AM-PAC 6 Clicks Basic Mobility (PT);Modified Milagros  -       User Key  (r) = Recorded By, (t) = Taken By, (c) = Cosigned By    Initials Name Provider Type    Radha Busch, PT Physical Therapist        Physical Therapy Education     Title: PT OT SLP Therapies (In Progress)     Topic: Physical Therapy (In Progress)     Point: Mobility training (Done)     Learning Progress Summary           Patient Acceptance, E,TB,D, VU,NR by LASHA at 10/1/2019  8:57 AM                               User Key     Initials Effective Dates Name Provider Type [facility] 04/03/18 -  Radha Kelley, PT Physical Therapist PT              PT Recommendation and Plan  Planned Therapy Interventions (PT Eval): balance training, bed mobility training,  gait training, home exercise program, patient/family education, ROM (range of motion), strengthening, transfer training  Outcome Summary/Treatment Plan (PT)  Anticipated Discharge Disposition (PT): home with assist, home with home health  Plan of Care Reviewed With: patient  Progress: improving  Outcome Summary: Pt agreeable to PT this am. He was admitted on 9/30/10 for stroke. Pt with hx of CVA and migraines. Pt reports some weakness and unsteadiness even at baseline. Today, pt presents with decreased balance, generalized weakness, and decreased functional mobility. Pt was able to ambulate approx 150 ft with CGA x2. He requires cue to  feet and increase B heel strike. He does have some unsteadiness, but not sure if this is anything new. Pt plans home at [place]. Will continue to follow pt for skilled PT to ensure safety and maximize independence with mobility.     Time Calculation:   PT Charges     Row Name 10/01/19 0902             Time Calculation    Start Time  0830  -EJ      Stop Time  0849  -EJ      Time Calculation (min)  19 min  -EJ      PT Received On  10/01/19  -EJ      PT - Next Appointment  10/02/19  -EJ      PT Goal Re-Cert Due Date  10/08/19  -EJ         Timed Charges    50474 - PT Therapeutic Exercise Minutes  10  -EJ        User Key  (r) = Recorded By, (t) = Taken By, (c) = Cosigned By    Initials Name Provider Type    EJ Radha Kelley, PT Physical Therapist        Therapy Charges for Today     Code Description Service Date Service Provider Modifiers Qty    82008967621 HC PT EVAL MOD COMPLEXITY 2 10/1/2019 Radha Kelley, PT GP 1    97106397505 HC PT THER PROC EA 15 MIN 10/1/2019 Radha Kelley, PT GP 1    05782741641 HC PT THER SUPP EA 15 MIN 10/1/2019 Radha Kelley, PT GP 1          PT G-Codes  Outcome Measure Options: AM-PAC 6 Clicks Basic Mobility (PT), Modified Milagros  AM-PAC 6 Clicks Score (PT): 19  Modified Mil[place]ros Scale: 3 - Moderate disability.  Requiring some help, but  able to walk without assistance.    Radha Kelley, PT  10/1/2019

## 2019-10-01 NOTE — DISCHARGE PLACEMENT REQUEST
"Rosey Maradiaga (64 y.o. Male)     Date of Birth Social Security Number Address Home Phone MRN    1955  [address] [facility] [phone] [id number]    Gnosticist Marital Status          Confucianist        Admission Date Admission Type Admitting Provider Attending Provider Department, Room/Bed    9/30/19 Urgent Jeffery Alcaraz MD Benton, John B, MD [address], [address]    Discharge Date Discharge Disposition Discharge Destination                       Attending Provider:  Mode Messina MD    Allergies:  Elavil [Amitriptyline Hcl]    Isolation:  None   Infection:  None   Code Status:  CPR    Ht:  177.8 cm (70\")   Wt:  87 kg (191 lb 14.4 oz)    Admission Cmt:  None   Principal Problem:  TIA (transient ischemic attack) [G45.9]                 Active Insurance as of 9/30/2019     Primary Coverage     Payor Plan Insurance Group Employer/Plan Group    MEDICARE MEDICARE A & B      Payor Plan Address Payor Plan Phone Number Payor Plan Fax Number Effective Dates    [address] [phone]  1/1/1995 - None Entered    [facility] [id number]       Subscriber Name Subscriber Birth Date Member ID       ROSEY MARADIAGA 1955 [id number]           Secondary Coverage     Payor Plan Insurance Group Employer/Plan Group    [place]      Payor Plan Address Payor Plan Phone Number Payor Plan Fax Number Effective Dates    [address] [phone]  9/30/2019 - None Entered    [place] [address]       Subscriber Name Subscriber Birth Date Member ID       ROSEY MARADIAGA 1955 [id number]                 Emergency Contacts      (Rel.) Home Phone Work Phone Mobile Phone    KATELYN MARADIAGA (Daughter) [phone] -- --              "

## 2019-10-01 NOTE — PROGRESS NOTES
Discharge Planning Assessment  [facility]     Patient Name: Ramiro Maradiaga  MRN: [id number]  Today's Date: 10/1/2019    Admit Date: 9/30/2019    Discharge Needs Assessment     Row Name 10/01/19 1022       Living Environment    Lives With  other (see comments)    Current Living Arrangements  home/apartment/condo    Primary Care Provided by  self    Family Caregiver if Needed  child(ana lilia), adult;other relative(s)    Quality of Family Relationships  helpful    Able to Return to Prior Arrangements  yes       Transition Planning    Patient/Family Anticipates Transition to  home with family    Patient/Family Anticipated Services at Transition  home health care    Transportation Anticipated  family or friend will provide;car, drives self       Discharge Needs Assessment    Readmission Within the Last 30 Days  no previous admission in last 30 days    Concerns to be Addressed  discharge planning    Equipment Currently Used at Home  walker, rolling;wheelchair, motorized    Offered/Gave Vendor List  yes        Discharge Plan     Row Name 10/01/19 1024       Plan    Plan  Pt plans to stay with his cousin upon DC. Referral to [facility].      Plan Comments  Spoke with pt at bedside.  Facesheet and pharmacy info confirmed.  Pt lives alone in a trailery, but he plans to stay with his cousin Estuardo Maradiaga for a while after DC.  Pt states he is IADLs and drives occasionally.  His dtr lives in [place] and she assists occasionally with transportation.  Pt has had [facility] in the past and would want them again if needed.  Referral called to [facility] ([phone]).  [facility] and [place] for rehab in the past.           Destination      No service coordination in this encounter.      Durable Medical Equipment      No service coordination in this encounter.      Dialysis/Infusion      No service coordination in this encounter.      Home Medical Care      Service Provider Request Status  Selected Services Address Phone Number Fax Number    [facility] [place] - Request Sent N/A [address] [facility] [place] [address] [phone]      Therapy      No service coordination in this encounter.      Community Resources      No service coordination in this encounter.          Demographic Summary     Row Name 10/01/19 1017       General Information    Admission Type  observation    Arrived From  home    Referral Source  admission list    Reason for Consult  discharge planning    Preferred Language  English        Functional Status     Row Name 10/01/19 1021       Functional Status    Usual Activity Tolerance  moderate    Current Activity Tolerance  moderate       Functional Status, IADL    Medications  independent    Meal Preparation  independent    Housekeeping  independent    Laundry  independent    Shopping  independent       Mental Status    General Appearance WDL  WDL        Psychosocial    No documentation.       Abuse/Neglect    No documentation.       Legal    No documentation.       Substance Abuse    No documentation.       Patient Forms     Row Name 10/01/19 1024       Patient Forms    Provider Choice List  Delivered    Delivered to  Patient    Method of delivery  In person            Cindy Valdez RN

## 2019-10-01 NOTE — PLAN OF CARE
Problem: Patient Care Overview  Goal: Plan of Care Review  Outcome: Ongoing (interventions implemented as appropriate)   10/01/19 0521   Coping/Psychosocial   Plan of Care Reviewed With patient   Plan of Care Review   Progress no change   OTHER   Outcome Summary New admit for stroke, MRI & CTA ordered, Reg CC diet, pt A&O x4, NIH 2 for ataxia in JADEN legs, PO pain meds scheduled, ON 2l oS AT hs, vss will CTM       Problem: Fall Risk (Adult)  Goal: Identify Related Risk Factors and Signs and Symptoms  Outcome: Outcome(s) achieved Date Met: 10/01/19    Goal: Absence of Fall  Outcome: Ongoing (interventions implemented as appropriate)      Problem: Stroke (Ischemic) (Adult)  Goal: Signs and Symptoms of Listed Potential Problems Will be Absent, Minimized or Managed (Stroke)  Outcome: Ongoing (interventions implemented as appropriate)

## 2019-10-01 NOTE — PROGRESS NOTES
"   LOS: 1 day   Patient Care Team:  Heidi Jalloh APRN as PCP - General (Nurse Practitioner)    Chief Complaint: left sided HA    Subjective     Pt still c/o numbness and weakness in left side of body, c/o left sided headache involving his left face and left side of head. Hurts to chew sometimes, but not in a claudication pattern. C/o pain in right abdomen due to chronic hernias that he has been told need to be fixed.         Subjective:  Symptoms:  Stable.  He reports weakness and headache.  No shortness of breath, malaise, cough, chest pain, chest pressure, anorexia, diarrhea or anxiety.    Diet:  Adequate intake.  No nausea or vomiting.    Activity level: Impaired due to weakness.    Pain:  He complains of pain that is mild.  He reports pain is unchanged.  Pain is well controlled and requiring pain medication.        History taken from: patient chart RN    Objective     Vital Signs  Temp:  [97.5 °F (36.4 °C)-98.7 °F (37.1 °C)] 97.5 °F (36.4 °C)  Heart Rate:  [76-91] 91  Resp:  [16-18] 16  BP: (122-136)/(87-99) 123/87    Objective:  General Appearance:  Comfortable and in no acute distress.    Vital signs: (most recent): Blood pressure 123/87, pulse 91, temperature 97.5 °F (36.4 °C), temperature source Oral, resp. rate 16, height 177 cm (69.69\"), weight 87 kg (191 lb 12.8 oz), SpO2 93 %.  Vital signs are normal.  No fever.    Output: Producing urine.    HEENT: Normal HEENT exam.    Lungs:  Normal effort and normal respiratory rate.  Breath sounds clear to auscultation.  He is not in respiratory distress.    Heart: Normal rate.  Regular rhythm.  No murmur.   Abdomen: Abdomen is soft.  (Pt has large right sided hernia that is TTP but not incarcerated).  Bowel sounds are normal.     Extremities: There is no dependent edema.    Pulses: Distal pulses are intact.    Neurological: Patient is alert and oriented to person, place and time.  Normal strength.  Patient has normal muscle tone.  (Pt has subjective loss " of sensation on left side, he claims he is weak on the left, but has no drift).    Pupils:  Pupils are equal, round, and reactive to light.  (No photophobia).    Skin:  Warm and dry.  No rash.             Results Review:     I reviewed the patient's new clinical results.  I reviewed the patient's new imaging results and agree with the interpretation.  I reviewed the patient's other test results and agree with the interpretation  I personally viewed and interpreted the patient's EKG/Telemetry data  Discussed with pt and RN    Results from last 7 days   Lab Units 09/30/19  1722   WBC 10*3/mm3 9.73   HEMOGLOBIN g/dL 14.3   PLATELETS 10*3/mm3 136*     Results from last 7 days   Lab Units 09/30/19  1723   SODIUM mmol/L 138   POTASSIUM mmol/L 4.1   CHLORIDE mmol/L 99   CO2 mmol/L 29.0   BUN mg/dL 11   CREATININE mg/dL 0.88   CALCIUM mg/dL 8.2*   Estimated Creatinine Clearance: 92.6 mL/min (by C-G formula based on SCr of 0.88 mg/dL).     Medication Review: reviewed and adjusted    Assessment/Plan       TIA (transient ischemic attack)    Diabetes mellitus (CMS/[facility])    CHF (congestive heart failure) (CMS/[facility])    Stroke (CMS/[facility]) - history of    History of carotid endarterectomy    Ataxia    Acute lacunar stroke (CMS/[facility])          Plan:   (Appreciate Neuro attention to pt  Right lacunar infarct on repeat MRI, doesn't really correspond to pt's symptoms though  CTA and Echo are pending  Continue ASA and statin (cholesterol high)  PT/OT/ST evals pending  Replace B12 IM x 3 doses then oral B12 on discharge  I don't think pt's calcium level is low enough to cause any neurologic symptoms  Will check an iCa++  I also don't think pt has temporal arteritis: his TA pulse is excellent and TA is non-tender, his ESR is also normal  Will defer to Neuro, he has been started on high dose oral Prednisone and Ophtho has been consulted  Will ask Gen Surg to see regarding his large tender hernias  Sugars are high due to steroids, HgbA1c is 9.5,  will adjust insulin, continue to hold Metformin  Further orders to follow as suggested by evolving hospital course).       Mode Messina MD  10/01/19  3:25 PM    Time: 45min

## 2019-10-01 NOTE — THERAPY EVALUATION
Acute Care - Occupational Therapy Initial Evaluation  Psychiatric     Patient Name: Ramiro Maradiaga  : 1955  MRN: [id number]  Today's Date: 10/1/2019  Onset of Illness/Injury or Date of Surgery: 19  Date of Referral to OT: 19  Referring Physician: Ross     Admit Date: 2019     No diagnosis found.  Patient Active Problem List   Diagnosis   • TIA (transient ischemic attack)   • Diabetes mellitus (CMS/[facility])   • CHF (congestive heart failure) (CMS/[facility])   • Stroke (CMS/[facility]) - history of   • History of carotid endarterectomy   • Ataxia     Past Medical History:   Diagnosis Date   • Arthritis    • CHF (congestive heart failure) (CMS/[facility])    • Coronary artery disease    • Diabetes mellitus (CMS/[facility])    • Fracture cervical vertebra-closed (CMS/[facility])    • Fracture of lumbar spine (CMS/[facility]) 2016   • Stroke (CMS/[facility])      Past Surgical History:   Procedure Laterality Date   • BREAST LUMPECTOMY Right    • CARDIAC SURGERY      quadruple bypass,valve repair   • EYE SURGERY      bilateral cataract surgery   • SPINAL FIXATION SURGERY W/ IMPLANT N/A           OT ASSESSMENT FLOWSHEET (last 12 hours)      Occupational Therapy Evaluation     Row Name 10/01/19 1016                   OT Evaluation Time/Intention    Subjective Information  no complaints  -SK        Document Type  evaluation  -SK        Mode of Treatment  individual therapy;occupational therapy  -SK        Patient Effort  good  -SK           General Information    Patient Profile Reviewed?  yes  -SK        Onset of Illness/Injury or Date of Surgery  19  -SK        Referring Physician  Ross   -SK        Patient Observations  cooperative;alert;agree to therapy  -SK        Prior Level of Function  independent:  -SK        Existing Precautions/Restrictions  fall  -SK        Barriers to Rehab  none identified  -SK           Cognitive Assessment/Intervention- PT/OT    Orientation Status (Cognition)  oriented x 3  -SK        Follows Commands  (Cognition)  WNL  -SK           Safety Issues, Functional Mobility    Impairments Affecting Function (Mobility)  balance;endurance/activity tolerance;pain;strength  -SK           Bed Mobility Assessment/Treatment    Bed Mobility Assessment/Treatment  supine-sit;sit-supine  -SK        Supine-Sit [place] (Bed Mobility)  supervision  -SK        Sit-Supine [place] (Bed Mobility)  supervision  -SK        Assistive Device (Bed Mobility)  bed rails  -SK           Functional Mobility    Functional Mobility- Ind. Level  contact guard assist  -SK        Functional Mobility-Distance (Feet)  20  -SK        Functional Mobility- Safety Issues  step length decreased;balance decreased during turns  -SK           Transfer Assessment/Treatment    Transfer Assessment/Treatment  sit-stand transfer;stand-sit transfer;toilet transfer  -SK           Sit-Stand Transfer    Sit-Stand [place] (Transfers)  verbal cues;contact guard  -SK           Stand-Sit Transfer    Stand-Sit [place] (Transfers)  verbal cues;contact guard  -SK           Toilet Transfer    Type (Toilet Transfer)  sit-stand;stand-sit  -SK        [place] Level (Toilet Transfer)  verbal cues;contact guard  -SK        Assistive Device (Toilet Transfer)  commode;grab bars/safety frame  -SK           ADL Assessment/Intervention    BADL Assessment/Intervention  bathing;upper body dressing;lower body dressing;grooming;toileting  -SK           Lower Body Dressing Assessment/Training    Lower Body Dressing [place] Level  doff;don;socks;set up  -SK        Lower Body Dressing Position  edge of bed sitting  -SK           Grooming Assessment/Training    [place] Level (Grooming)  grooming skills;oral care regimen;wash face, hands;set up;contact guard assist  -SK        Grooming Position  unsupported standing  -SK        Comment (Grooming)  pt able to use LUE as gross grasp to hold toothbrush and perform, difficult noted to use LUE to put top back on  toothpaste   -SK           General ROM    GENERAL ROM COMMENTS  BUE WFL with exception of digits 3-5..unable to fully flex and unable to touch fingertips with thumb   -SK           MMT (Manual Muscle Testing)    General MMT Comments  GW grossly 3+/5   -SK           Static Sitting Balance    Level of [place] (Unsupported Sitting, Static Balance)  independent  -SK           Dynamic Sitting Balance    Level of [place], Reaches Outside Midline (Sitting, Dynamic Balance)  supervision  -SK           Static Standing Balance    Level of [place] (Supported Standing, Static Balance)  standby assist  -SK           Dynamic Standing Balance    Level of [place], Reaches Outside Midline (Standing, Dynamic Balance)  contact guard assist  -SK           Sensory Assessment/Intervention    Sensory General Assessment  no sensation deficits identified sensory intact for BUE   -SK           Positioning and Restraints    Pre-Treatment Position  in bed  -SK        Post Treatment Position  bed  -SK        In Bed  call light within reach;encouraged to call for assist;exit alarm on  -SK           Pain Scale: Numbers Pre/Post-Treatment    Pain Scale: Numbers, Pretreatment  0/10 - no pain  -SK        Pain Scale: Numbers, Post-Treatment  2/10  -SK           Clinical Impression (OT)    Date of Referral to OT  09/30/19  -SK        OT Diagnosis  Pt 63 YO male admitted for stroke, pt has h/o CVA and migraines.  Pt presents to OT with GW, decreased functional mobility, decreased ind with ADLs.    -SK        Functional Level at Time of Evaluation (OT Eval)  Pt don socks with set-up at EOB, perform grooming upright at sink with CGA, perform functionaol mobility in room with CGA.  Pt will benefit from skilled OT to address deficits and increase ind with ADLS and anticipate pt to d/c home with possible HH, depending on progress.   -SK        Patient/Family Goals Statement (OT Eval)  to return home and also get back pain under control    -SK        Criteria for Skilled Therapeutic Interventions Met (OT Eval)  yes  -SK        Rehab Potential (OT Eval)  good, to achieve stated therapy goals  -SK        Therapy Frequency (OT Eval)  5 times/wk  -SK        Care Plan Review (OT)  evaluation/treatment results reviewed;care plan/treatment goals reviewed;patient/other agree to care plan  -SK        Anticipated Equipment Needs at Discharge (OT)  shower chair  -SK        Anticipated Discharge Disposition (OT)  home;home with home health  -SK           Planned OT Interventions    Planned Therapy Interventions (OT Eval)  activity tolerance training;BADL retraining;strengthening exercise;transfer/mobility retraining  -SK           OT Goals    Transfer Goal Selection (OT)  transfer, OT goal 1  -SK        Toileting Goal Selection (OT)  toileting, OT goal 1  -SK        Strength Goal Selection (OT)  strength, OT goal 1  -SK        Additional Documentation  Strength Goal Selection (OT) (Row)  -SK           Transfer Goal 1 (OT)    Activity/Assistive Device (Transfer Goal 1, OT)  transfers, all;sit-to-stand/stand-to-sit;bed-to-chair/chair-to-bed;toilet;shower chair;commode  -SK        [place] Level/Cues Needed (Transfer Goal 1, OT)  independent  -SK        Time Frame (Transfer Goal 1, OT)  1 week  -SK        Progress/Outcome (Transfer Goal 1, OT)  goal ongoing  -SK           Toileting Goal 1 (OT)    Activity/Device (Toileting Goal 1, OT)  toileting skills, all;adjust/manage clothing;perform perineal hygiene;commode;grab bar/safety frame  -SK        [place] Level/Cues Needed (Toileting Goal 1, OT)  independent  -SK        Time Frame (Toileting Goal 1, OT)  1 week  -SK        Progress/Outcome (Toileting Goal 1, OT)  goal ongoing  -SK           Strength Goal 1 (OT)    Strength Goal 1 (OT)  Pt perform BUE AROM ex to increase BUE strength and LUE hand strength to 4-/5   -SK        Time Frame (Strength Goal 1, OT)  1 week  -SK        Progress/Outcome (Strength Goal  1, OT)  goal ongoing  -SK          User Key  (r) = Recorded By, (t) = Taken By, (c) = Cosigned By    Initials Name Effective Dates    SK Annabelle Rincon OT 02/25/19 -          Occupational Therapy Education     Title: PT OT SLP Therapies (In Progress)     Topic: Occupational Therapy (Done)     Point: ADL training (Done)     Description: Instruct learner(s) on proper safety adaptation and remediation techniques during self care or transfers.   Instruct in proper use of assistive devices.    Learning Progress Summary           Patient Acceptance, E, VU by SK at 10/1/2019 10:27 AM    Comment:  Edu pt on OT, DME, HEP, Safety, ADLs                   Point: Home exercise program (Done)     Description: Instruct learner(s) on appropriate technique for monitoring, assisting and/or progressing therapeutic exercises/activities.    Learning Progress Summary           Patient Acceptance, E, VU by SK at 10/1/2019 10:27 AM    Comment:  Edu pt on OT, DME, HEP, Safety, ADLs                   Point: Precautions (Done)     Description: Instruct learner(s) on prescribed precautions during self-care and functional transfers.    Learning Progress Summary           Patient Acceptance, E, VU by SK at 10/1/2019 10:27 AM    Comment:  Edu pt on OT, DME, HEP, Safety, ADLs                   Point: Body mechanics (Done)     Description: Instruct learner(s) on proper positioning and spine alignment during self-care, functional mobility activities and/or exercises.    Learning Progress Summary           Patient Acceptance, E, VU by SK at 10/1/2019 10:27 AM    Comment:  Edu pt on OT, DME, HEP, Safety, ADLs                               User Key     Initials Effective Dates Name Provider Type Discipline    SK 02/25/19 -  Annabelle Rincon OT Occupational Therapist OT                  OT Recommendation and Plan  Outcome Summary/Treatment Plan (OT)  Anticipated Equipment Needs at Discharge (OT): shower chair  Anticipated Discharge Disposition (OT):  home, home with home health  Planned Therapy Interventions (OT Eval): activity tolerance training, BADL retraining, strengthening exercise, transfer/mobility retraining  Therapy Frequency (OT Eval): 5 times/wk  Plan of Care Review  Plan of Care Reviewed With: patient  Plan of Care Reviewed With: patient  Outcome Summary: Pt 65 YO male admitted for stroke, pt has h/o CVA and migraines.  Pt presents to OT with GW, decreased functional mobility, decreased ind with ADLs.  Pt don socks with set-up at EOB, perform grooming upright at sink with CGA, perform functionaol mobility in room with CGA.  Pt will benefit from skilled OT to address deficits and increase ind with ADLS and anticipate pt to d/c home with possible HH, depending on progress.     Outcome Measures     Row Name 10/01/19 1000             How much help from another is currently needed...    Putting on and taking off regular lower body clothing?  3  -SK      Bathing (including washing, rinsing, and drying)  3  -SK      Toileting (which includes using toilet bed pan or urinal)  3  -SK      Putting on and taking off regular upper body clothing  3  -SK      Taking care of personal grooming (such as brushing teeth)  3  -SK      Eating meals  4  -SK      AM-PAC 6 Clicks Score (OT)  19  -SK         Modified Pleas[place] Scale    Modified [place] Scale  3 - Moderate disability.  Requiring some help, but able to walk without assistance.  -SK         Functional Assessment    Outcome Measure Options  AM-PAC 6 Clicks Daily Activity (OT)  -SK        User Key  (r) = Recorded By, (t) = Taken By, (c) = Cosigned By    Initials Name Provider Type    Annabelle Grey OT Occupational Therapist          Time Calculation:   Time Calculation- OT     Row Name 10/01/19 1028             Time Calculation- OT    OT Start Time  0953  -SK      OT Stop Time  1014  -SK      OT Time Calculation (min)  21 min  -SK      Total Timed Code Minutes- OT  15 minute(s)  -SK      OT Received On  10/01/19   -SK      OT Goal Re-Cert Due Date  10/08/19  -SK        User Key  (r) = Recorded By, (t) = Taken By, (c) = Cosigned By    Initials Name Provider Type    Annabelle Grey OT Occupational Therapist        Therapy Charges for Today     Code Description Service Date Service Provider Modifiers Qty    36908080868  OT EVAL LOW COMPLEXITY 2 10/1/2019 Annabelle Rincon OT GO 1    [id number]  OT SELF CARE/MGMT/TRAIN EA 15 MIN 10/1/2019 Annabelle Rincon OT GO 1               Annabelle Rincon OT  10/1/2019

## 2019-10-01 NOTE — PLAN OF CARE
Problem: Patient Care Overview  Goal: Plan of Care Review   10/01/19 1016   Coping/Psychosocial   Plan of Care Reviewed With patient   OTHER   Outcome Summary Pt 63 YO male admitted for stroke, pt has h/o CVA and migraines. Pt presents to OT with GW, decreased functional mobility, decreased ind with ADLs. Pt don socks with set-up at EOB, perform grooming upright at sink with CGA, perform functionaol mobility in room with CGA. Pt will benefit from skilled OT to address deficits and increase ind with ADLS and anticipate pt to d/c home with possible HH, depending on progress.

## 2019-10-01 NOTE — PROGRESS NOTES
DOS: 10/1/2019  NAME: Ramiro Maradiaga   : 1955  PCP: Heidi Jalloh APRN    No chief complaint on file.  CC: Blurred vision, ataxia, left jaw pain, dysphasia    Stroke    Subjective: Pt seen in follow up, however the problem is new to me.  Lying in bed remains with left eye visual disturbance, stating that he has intermittent blurred vision with associated headache around the temporal area.  He is also having left hand numbness in all of his fingertips and states that he is unable to close his fingers together.  He has decreased sensation on his left upper and lower extremity as well as left facial decreased sensation to light touch.  He denies any difficulty swallowing or speech issues.  Still having the left jaw pain and stiffness.  States when he got up to walk this morning he still felt clumsy.  Reports he was recently told that he had a left ear infection.    Objective:  Vital signs:      Vitals:    19 1900 19 2300 10/01/19 0500 10/01/19 0737   BP: 132/99 134/90  136/97   BP Location: Right arm Right arm  Right arm   Patient Position: Lying Lying  Lying   Pulse: 83 83  89   Resp: 18 18  18   Temp: 98.2 °F (36.8 °C) 98.7 °F (37.1 °C)  98.5 °F (36.9 °C)   TempSrc: Oral Oral  Oral   SpO2: 93% 93%     Weight:   87 kg (191 lb 14.4 oz)    Height:           Current Facility-Administered Medications:   •  acetaminophen (TYLENOL) tablet 650 mg, 650 mg, Oral, Q4H PRN, 650 mg at 19 **OR** acetaminophen (TYLENOL) suppository 650 mg, 650 mg, Rectal, Q4H PRN, Chang Hawkins MD  •  aspirin chewable tablet 81 mg, 81 mg, Oral, Daily, 81 mg at 10/01/19 0815 **OR** aspirin suppository 300 mg, 300 mg, Rectal, Daily, Chang Hawkins MD  •  atorvastatin (LIPITOR) tablet 80 mg, 80 mg, Oral, Nightly, Chang Hawkins MD, 80 mg at 19  •  dextrose (D50W) 25 g/ 50mL Intravenous Solution 25 g, 25 g, Intravenous, Q15 Min PRN, Jeffery Alcaraz MD  •  dextrose (GLUTOSE) oral gel 15 g,  15 g, Oral, Q15 Min PRN, Jeffery Alcaraz MD  •  DULoxetine (CYMBALTA) DR capsule 60 mg, 60 mg, Oral, Nightly, Jeffery Alcaraz MD  •  famotidine (PEPCID) tablet 20 mg, 20 mg, Oral, BID AC, Ricardo Mead MD, 20 mg at 10/01/19 0815  •  gabapentin (NEURONTIN) capsule 400 mg, 400 mg, Oral, TID, Jeffery Alcaraz MD, 400 mg at 10/01/19 0815  •  glucagon (human recombinant) (GLUCAGEN DIAGNOSTIC) injection 1 mg, 1 mg, Subcutaneous, Q15 Min PRN, Jeffery Alcaraz MD  •  insulin lispro (humaLOG) injection 0-9 Units, 0-9 Units, Subcutaneous, 4x Daily With Meals & Nightly, Jeffery Alcaraz MD, 7 Units at 10/01/19 0815  •  ondansetron (ZOFRAN) injection 4 mg, 4 mg, Intravenous, Q6H PRN, Chang Hawkins MD  •  oxyCODONE-acetaminophen (PERCOCET) 7.5-325 MG per tablet 1 tablet, 1 tablet, Oral, BID, Jeffery Alcaraz MD, 1 tablet at 10/01/19 0815  •  predniSONE (DELTASONE) tablet 60 mg, 60 mg, Oral, Daily, Ricardo Mead MD, 60 mg at 10/01/19 0815  •  sodium chloride 0.9 % flush 10 mL, 10 mL, Intravenous, Q12H, hCang Hawkins MD, 10 mL at 10/01/19 0815  •  sodium chloride 0.9 % flush 10 mL, 10 mL, Intravenous, PRN, Chang Hawkins MD    PRN meds  •  acetaminophen **OR** acetaminophen  •  dextrose  •  dextrose  •  glucagon (human recombinant)  •  ondansetron  •  sodium chloride    No current facility-administered medications on file prior to encounter.      Current Outpatient Medications on File Prior to Encounter   Medication Sig   • DULoxetine (CYMBALTA) 60 MG capsule Take 60 mg by mouth Every Night.   • gabapentin (NEURONTIN) 400 MG capsule Take 400 mg by mouth 3 (Three) Times a Day.   • metFORMIN (GLUCOPHAGE) 500 MG tablet Take 500 mg by mouth 2 (Two) Times a Day With Meals.   • oxyCODONE-acetaminophen (PERCOCET) 7.5-325 MG per tablet Take 1 tablet by mouth 2 (Two) Times a Day.       General appearance: NAD, alert and cooperative  HEENT: Normocephalic, atraumatic, PERRL, left temporal tenderness  COR:  RRR  Resp: Even and unlabored  Extremities: no edema  Skin: warm, dry    Neurological:   MS: oriented x3, recent/remote memory intact, normal attention/concentration, language intact, no neglect, normal fund of knowledge  CN: visual acuity grossly normal, visual fields full, PERRL, EOMI, decreased left facial sensation to light touch, no facial droop, hearing symmetric, palate elevates symmetrically, shoulder shrug equal, tongue midline  Motor: 5/5 in all 4 ext., normal tone  Reflexes: 1+ in all ext. Except LUE areflexic  Sensory: light touch sensation decreased on left side and left face  Coordination: Normal finger to nose test, a little ataxic when he closes his eyes on left  Gait and station: ataxia, has trouble picking up his feet when walking which makes it looks like he shuffles   Rapid alternating movements: normal finger to thumb tap    Laboratory results:  No results found for: TSH  Lab Results   Component Value Date    HGBA1C 9.50 (H) 10/01/2019     No results found for: [id number]  Lab Results   Component Value Date    CHOL 226 (H) 10/01/2019     Lab Results   Component Value Date    TRIG 144 10/01/2019     Lab Results   Component Value Date    HDL 41 10/01/2019     Lab Results   Component Value Date     (H) 10/01/2019     Lab Results   Component Value Date    WBC 9.73 09/30/2019    HGB 14.3 09/30/2019    HCT 42.5 09/30/2019    MCV 92.0 09/30/2019     (L) 09/30/2019     Lab Results   Component Value Date    GLUCOSE 206 (H) 09/30/2019    BUN 11 09/30/2019    CREATININE 0.88 09/30/2019    EGFRIFNONA 87 09/30/2019    BCR 12.5 09/30/2019    K 4.1 09/30/2019    CO2 29.0 09/30/2019    CALCIUM 8.2 (L) 09/30/2019    ALBUMIN 3.60 09/30/2019    AST 15 09/30/2019    ALT 8 09/30/2019     No results found for: PTT  Lab Results   Component Value Date    INR 1.02 09/30/2019    PROTIME 13.1 09/30/2019     Brief Urine Lab Results     None        CRP 0.12  ESR 12    Review and interpretation of imaging:  Mri  Brain With & Without Contrast    Result Date: 10/1/2019  Small vessel ischemic disease with no evidence of acute infarction, mass or of abnormal enhancement. A small lacunar infarct involving the thalamus on the left is appreciated. There is a trace amount of fluid present within the mastoid process on the right.  CHECK CHECK            Impression/Assessment:  This is a 64-year-old male with a past medical history of CHF, CAD, diabetes, lumbar spine fracture, stroke with prior left CEA not on any anticoagulation or antiplatelets prior to admission, migraine who presented to the hospital after being transferred from [facility] with complaints of ataxia, vertigo, left jaw pain, intermittent blurred vision in the left eye, scalp tenderness on the left.  MRI at the outside hospital was negative, reviewed by Dr. Mead.  Blood pressure on arrival 122/95 with a heart rate of 76.  EKG revealed a predominantly normal sinus rhythm.  Blood sugar 213.    1.  Left-sided numbness  2.  Ataxia  3.  Left temporal headache with jaw claudication  4.  Left eye visual disturbance  5.  History of stroke   6.  History of Left carotid endarterectomy    I reviewed his MRI brain, from my independent review I did not see any acute infarcts, old infarct noted with some moderate small vessel disease.  Awaiting CTA head and neck.  Awaiting ophthalmology to evaluate, remains with a left eye intermittent blurred vision that he describes as looking through a glass with water.  Currently he is not having any visual disturbances.  Remains with left sided temporal headache with intermittent jaw pain and stiffness.  Currently on prednisone treatment.  Inflammatory markers normal.  He is having numbness and tingling in his left hand fingers. Will check B12 and TSH levels.  Of note his calcium level was also low on arrival, 8.2, which could contribute to paresthesias. Would like for primary to address.  I spoke with the physical therapist about his  gait and ambulation, she reports that the patient appears to be at his baseline as he has difficulty lifting his feet to step and at times shuffled with some mild balance issues.  She did not feel that he had any parkinsonism features. He was also told he had a left ear infection, MRI reveals trace fluid within the right mastoid process.  He has multiple uncontrolled risk factors, A1c 9.50 and . Continue high dose statin. Diabetic educator to see for elevated A1C. Of note, he has multiple abdominal hernias with the largest being on his left side.  He reports that his cardiologist told him that he should get further work-up somewhere in [place] but he has not obtained a physician.  I recommend that he be referred to our general surgery department here for further evaluation as he is having a lot of pain in that area. If Optho reveals no further work up, CTA H/N unremarkable, and symptoms do not improve, will consider LP. Therapies as written. CCP for discharge planning. Call RRT for any acute neurological changes. We will continue to follow and advise.    Plan:  Check B12 and TSH levels today  2D echo pending.  CTA H/N pending  Ophthalmology consult   Continue prednisone 60mg   ASA 81mg  Lipitor 80mg,   Diabetic educator to see for elevated A1C.  Recommend general surgery eval.  Neurochecks per stroke protocol  Normalize BP  Stroke Education  LEYDI/SCDs  PT/OT/ST    Case discussed with patient and Dr. Mead, and he agrees with plan above.   LEONEL Noriega

## 2019-10-02 ENCOUNTER — APPOINTMENT (OUTPATIENT)
Dept: CT IMAGING | Facility: HOSPITAL | Age: 64
End: 2019-10-02

## 2019-10-02 PROBLEM — G43.909 MIGRAINE: Chronic | Status: ACTIVE | Noted: 2019-10-02

## 2019-10-02 LAB
ALBUMIN SERPL-MCNC: 4 G/DL (ref 3.5–5.2)
ALBUMIN/GLOB SERPL: 1.3 G/DL
ALP SERPL-CCNC: 102 U/L (ref 39–117)
ALT SERPL W P-5'-P-CCNC: 8 U/L (ref 1–41)
ANION GAP SERPL CALCULATED.3IONS-SCNC: 11.8 MMOL/L (ref 5–15)
AST SERPL-CCNC: 9 U/L (ref 1–40)
BASOPHILS # BLD AUTO: 0.06 10*3/MM3 (ref 0–0.2)
BASOPHILS NFR BLD AUTO: 0.4 % (ref 0–1.5)
BILIRUB SERPL-MCNC: 0.3 MG/DL (ref 0.2–1.2)
BUN BLD-MCNC: 15 MG/DL (ref 8–23)
BUN/CREAT SERPL: 15.6 (ref 7–25)
CA-I BLD-MCNC: 5.2 MG/DL (ref 4.6–5.4)
CA-I SERPL ISE-MCNC: 1.29 MMOL/L (ref 1.15–1.35)
CALCIUM SPEC-SCNC: 9 MG/DL (ref 8.6–10.5)
CHLORIDE SERPL-SCNC: 99 MMOL/L (ref 98–107)
CO2 SERPL-SCNC: 28.2 MMOL/L (ref 22–29)
CREAT BLD-MCNC: 0.96 MG/DL (ref 0.76–1.27)
DEPRECATED RDW RBC AUTO: 45 FL (ref 37–54)
EOSINOPHIL # BLD AUTO: 0 10*3/MM3 (ref 0–0.4)
EOSINOPHIL NFR BLD AUTO: 0 % (ref 0.3–6.2)
ERYTHROCYTE [DISTWIDTH] IN BLOOD BY AUTOMATED COUNT: 13.1 % (ref 12.3–15.4)
GFR SERPL CREATININE-BSD FRML MDRD: 79 ML/MIN/1.73
GLOBULIN UR ELPH-MCNC: 3.1 GM/DL
GLUCOSE BLD-MCNC: 338 MG/DL (ref 65–99)
GLUCOSE BLDC GLUCOMTR-MCNC: 306 MG/DL (ref 70–130)
GLUCOSE BLDC GLUCOMTR-MCNC: 328 MG/DL (ref 70–130)
GLUCOSE BLDC GLUCOMTR-MCNC: 357 MG/DL (ref 70–130)
GLUCOSE BLDC GLUCOMTR-MCNC: 376 MG/DL (ref 70–130)
HCT VFR BLD AUTO: 43.5 % (ref 37.5–51)
HGB BLD-MCNC: 14.7 G/DL (ref 13–17.7)
IMM GRANULOCYTES # BLD AUTO: 0.19 10*3/MM3 (ref 0–0.05)
IMM GRANULOCYTES NFR BLD AUTO: 1.1 % (ref 0–0.5)
LYMPHOCYTES # BLD AUTO: 1.35 10*3/MM3 (ref 0.7–3.1)
LYMPHOCYTES NFR BLD AUTO: 8.1 % (ref 19.6–45.3)
MCH RBC QN AUTO: 31.7 PG (ref 26.6–33)
MCHC RBC AUTO-ENTMCNC: 33.8 G/DL (ref 31.5–35.7)
MCV RBC AUTO: 94 FL (ref 79–97)
MONOCYTES # BLD AUTO: 0.84 10*3/MM3 (ref 0.1–0.9)
MONOCYTES NFR BLD AUTO: 5.1 % (ref 5–12)
NEUTROPHILS # BLD AUTO: 14.15 10*3/MM3 (ref 1.7–7)
NEUTROPHILS NFR BLD AUTO: 85.3 % (ref 42.7–76)
NRBC BLD AUTO-RTO: 0 /100 WBC (ref 0–0.2)
PLATELET # BLD AUTO: 151 10*3/MM3 (ref 140–450)
PMV BLD AUTO: 10.7 FL (ref 6–12)
POTASSIUM BLD-SCNC: 4.5 MMOL/L (ref 3.5–5.2)
PROT SERPL-MCNC: 7.1 G/DL (ref 6–8.5)
RBC # BLD AUTO: 4.63 10*6/MM3 (ref 4.14–5.8)
SODIUM BLD-SCNC: 139 MMOL/L (ref 136–145)
WBC NRBC COR # BLD: 16.59 10*3/MM3 (ref 3.4–10.8)

## 2019-10-02 PROCEDURE — 25010000002 CYANOCOBALAMIN PER 1000 MCG: Performed by: HOSPITALIST

## 2019-10-02 PROCEDURE — 82962 GLUCOSE BLOOD TEST: CPT

## 2019-10-02 PROCEDURE — 80053 COMPREHEN METABOLIC PANEL: CPT | Performed by: HOSPITALIST

## 2019-10-02 PROCEDURE — 63710000001 INSULIN LISPRO (HUMAN) PER 5 UNITS: Performed by: HOSPITALIST

## 2019-10-02 PROCEDURE — 99221 1ST HOSP IP/OBS SF/LOW 40: CPT | Performed by: SURGERY

## 2019-10-02 PROCEDURE — 97110 THERAPEUTIC EXERCISES: CPT

## 2019-10-02 PROCEDURE — 74176 CT ABD & PELVIS W/O CONTRAST: CPT

## 2019-10-02 PROCEDURE — 63710000001 PREDNISONE PER 5 MG: Performed by: PSYCHIATRY & NEUROLOGY

## 2019-10-02 PROCEDURE — 92610 EVALUATE SWALLOWING FUNCTION: CPT

## 2019-10-02 PROCEDURE — 82330 ASSAY OF CALCIUM: CPT | Performed by: HOSPITALIST

## 2019-10-02 PROCEDURE — 97535 SELF CARE MNGMENT TRAINING: CPT

## 2019-10-02 PROCEDURE — 85025 COMPLETE CBC W/AUTO DIFF WBC: CPT | Performed by: HOSPITALIST

## 2019-10-02 RX ORDER — CLOPIDOGREL BISULFATE 75 MG/1
75 TABLET ORAL DAILY
Status: DISCONTINUED | OUTPATIENT
Start: 2019-10-03 | End: 2019-10-03 | Stop reason: HOSPADM

## 2019-10-02 RX ADMIN — OXYCODONE HYDROCHLORIDE AND ACETAMINOPHEN 1 TABLET: 7.5; 325 TABLET ORAL at 09:25

## 2019-10-02 RX ADMIN — FAMOTIDINE 20 MG: 20 TABLET, FILM COATED ORAL at 09:25

## 2019-10-02 RX ADMIN — INSULIN LISPRO 10 UNITS: 100 INJECTION, SOLUTION INTRAVENOUS; SUBCUTANEOUS at 17:34

## 2019-10-02 RX ADMIN — DULOXETINE HYDROCHLORIDE 60 MG: 60 CAPSULE, DELAYED RELEASE ORAL at 20:26

## 2019-10-02 RX ADMIN — SODIUM CHLORIDE, PRESERVATIVE FREE 10 ML: 5 INJECTION INTRAVENOUS at 20:27

## 2019-10-02 RX ADMIN — CYANOCOBALAMIN 1000 MCG: 1000 INJECTION, SOLUTION INTRAMUSCULAR; SUBCUTANEOUS at 09:26

## 2019-10-02 RX ADMIN — GABAPENTIN 400 MG: 400 CAPSULE ORAL at 20:26

## 2019-10-02 RX ADMIN — INSULIN LISPRO 12 UNITS: 100 INJECTION, SOLUTION INTRAVENOUS; SUBCUTANEOUS at 21:52

## 2019-10-02 RX ADMIN — OXYCODONE HYDROCHLORIDE AND ACETAMINOPHEN 1 TABLET: 7.5; 325 TABLET ORAL at 20:26

## 2019-10-02 RX ADMIN — GABAPENTIN 400 MG: 400 CAPSULE ORAL at 17:34

## 2019-10-02 RX ADMIN — SODIUM CHLORIDE, PRESERVATIVE FREE 10 ML: 5 INJECTION INTRAVENOUS at 09:26

## 2019-10-02 RX ADMIN — INSULIN LISPRO 10 UNITS: 100 INJECTION, SOLUTION INTRAVENOUS; SUBCUTANEOUS at 09:25

## 2019-10-02 RX ADMIN — PREDNISONE 60 MG: 50 TABLET ORAL at 09:25

## 2019-10-02 RX ADMIN — ASPIRIN 81 MG: 81 TABLET, CHEWABLE ORAL at 09:25

## 2019-10-02 RX ADMIN — FAMOTIDINE 20 MG: 20 TABLET, FILM COATED ORAL at 17:34

## 2019-10-02 RX ADMIN — ATORVASTATIN CALCIUM 80 MG: 80 TABLET, FILM COATED ORAL at 20:26

## 2019-10-02 RX ADMIN — GABAPENTIN 400 MG: 400 CAPSULE ORAL at 09:25

## 2019-10-02 RX ADMIN — INSULIN LISPRO 12 UNITS: 100 INJECTION, SOLUTION INTRAVENOUS; SUBCUTANEOUS at 12:34

## 2019-10-02 NOTE — PLAN OF CARE
Problem: Patient Care Overview  Goal: Plan of Care Review  Outcome: Ongoing (interventions implemented as appropriate)  Pt denies pain, discomfort, or shortness of breath at this time. No acute distress, will continue to monitor.

## 2019-10-02 NOTE — PLAN OF CARE
Problem: Patient Care Overview  Goal: Plan of Care Review   10/02/19 1127   OTHER   Outcome Summary Bedside Swallow Eval completed. No s/s of aspiration noted. Speech deficits have resolved. Recommend pt continue on a regular diet; meds with thin; upright for meals and 30 min after. ST is not indicated at this time. Please reconsult if further needs arise. Thank you.

## 2019-10-02 NOTE — PROGRESS NOTES
"   LOS: 2 days   Patient Care Team:  Heidi Jalloh APRN as PCP - General (Nurse Practitioner)    Chief Complaint: right sided abd pain    Subjective     Pt feeling a little better today. C/o painful \"hernia\" right lower abdomen. Says that's what he's most concerned about today. Headache is improving. Still has blurred vision and left sided jaw and face discomfort.         Subjective:  Symptoms:  Improved.  He reports weakness and headache.  No shortness of breath, malaise, cough, chest pain, chest pressure, anorexia, diarrhea or anxiety.    Diet:  Adequate intake.  No nausea or vomiting.    Activity level: Impaired due to weakness.    Pain:  He complains of pain that is mild.  He reports pain is unchanged.  Pain is well controlled and requiring pain medication.        History taken from: patient chart family RN    Objective     Vital Signs  Temp:  [97.2 °F (36.2 °C)-97.8 °F (36.6 °C)] 97.8 °F (36.6 °C)  Heart Rate:  [75-85] 83  Resp:  [16-18] 16  BP: (119-154)/(73-97) 154/97    Objective:  General Appearance:  Comfortable and in no acute distress.    Vital signs: (most recent): Blood pressure 154/97, pulse 83, temperature 97.8 °F (36.6 °C), temperature source Oral, resp. rate 16, height 177 cm (69.69\"), weight 86.9 kg (191 lb 8 oz), SpO2 93 %.  Vital signs are normal.  No fever.    Output: Producing urine.    HEENT: Normal HEENT exam.    Lungs:  Normal effort and normal respiratory rate.  Breath sounds clear to auscultation.  He is not in respiratory distress.    Heart: Normal rate.  Regular rhythm.  No murmur.   Abdomen: Abdomen is soft.  (Pt has large right sided hernia that is TTP but not incarcerated).  Bowel sounds are normal.     Extremities: There is no dependent edema.    Pulses: Distal pulses are intact.    Neurological: Patient is alert and oriented to person, place and time.  Normal strength.  Patient has normal muscle tone.  (Pt has subjective loss of sensation on left side, he claims he is weak " on the left, but has no drift).    Pupils:  Pupils are equal, round, and reactive to light.  (No photophobia).    Skin:  Warm and dry.  No rash.             Results Review:     I reviewed the patient's new clinical results.  I reviewed the patient's new imaging results and agree with the interpretation.  I reviewed the patient's other test results and agree with the interpretation  I personally viewed and interpreted the patient's EKG/Telemetry data  Discussed with pt, dtr, RN, CCP, and Neuro NP    Results from last 7 days   Lab Units 10/02/19  0700 09/30/19  1722   WBC 10*3/mm3 16.59* 9.73   HEMOGLOBIN g/dL 14.7 14.3   PLATELETS 10*3/mm3 151 136*     Results from last 7 days   Lab Units 10/02/19  0700 09/30/19  1723   SODIUM mmol/L 139 138   POTASSIUM mmol/L 4.5 4.1   CHLORIDE mmol/L 99 99   CO2 mmol/L 28.2 29.0   BUN mg/dL 15 11   CREATININE mg/dL 0.96 0.88   CALCIUM mg/dL 9.0 8.2*   Estimated Creatinine Clearance: 84.7 mL/min (by C-G formula based on SCr of 0.96 mg/dL).    Medication Review: reviewed    Assessment/Plan       TIA (transient ischemic attack)    CHF (congestive heart failure) (CMS/HCC)    Stroke (CMS/[facility]) - history of    History of carotid endarterectomy    Ataxia    Acute lacunar stroke (CMS/[facility])    Hernia of anterior abdominal wall    B12 deficiency    Type 2 diabetes mellitus with hyperglycemia, without long-term current use of insulin (CMS/[facility])    Left temporal headache    CAD (coronary artery disease)    Hypocalcemia          Plan:   (Appreciate Neuro attention to pt  Right lacunar infarct on repeat MRI, doesn't really correspond to pt's symptoms though and is not felt to be acute  CTA and Echo are okay  Continue statin (cholesterol high), ASA changed to Plavix given chronic changes in cervical circulation  PT/OT/ST evals all fine    Replace B12 IM x 3 doses then oral B12 on discharge    I don't think pt's calcium level is low enough to cause any neurologic symptoms and it is normal today,  iCa++ is also normal    I also don't think pt has temporal arteritis: his TA pulse is excellent and TA is non-tender, his ESR is also normal  Will defer to Neuro, he has been started on high dose oral Prednisone and Ophtho has been consulted--they will see pt later today  If Ophtho eval is negative then this is probably complex migraine, defer tx to Neuro    Asked Gen Surg to see regarding his large tender hernias--Dr. Zuniga does not in fact feel pt has any hernias, he has ordered a CT A&P given some asymmetry of upper abdomen    Sugars and WBC are high due to steroids, HgbA1c is 9.5, adjusted insulin, continue to hold Metformin, hopefully steroids can be stopped today after Ophtho eval    Further orders to follow as suggested by evolving hospital course).       Mode Messina MD  10/02/19  1:15 PM    Time: 25min

## 2019-10-02 NOTE — THERAPY TREATMENT NOTE
Acute Care - Occupational Therapy Treatment Note  [facility]     Patient Name: Ramiro Maradiaga  : 1955  MRN: [id number]  Today's Date: 10/2/2019  Onset of Illness/Injury or Date of Surgery: 19  Date of Referral to OT: 19  Referring Physician: Ross     Admit Date: 2019     No diagnosis found.  Patient Active Problem List   Diagnosis   • TIA (transient ischemic attack)   • CHF (congestive heart failure) (CMS/[facility])   • Stroke (CMS/[facility]) - history of   • History of carotid endarterectomy   • Ataxia   • Acute lacunar stroke (CMS/[facility])   • Hernia of anterior abdominal wall   • B12 deficiency   • Type 2 diabetes mellitus with hyperglycemia, without long-term current use of insulin (CMS/[facility])   • Left temporal headache   • CAD (coronary artery disease)   • Hypocalcemia     Past Medical History:   Diagnosis Date   • Arthritis    • CHF (congestive heart failure) (CMS/[facility])    • Coronary artery disease    • Diabetes mellitus (CMS/[facility])    • Fracture cervical vertebra-closed (CMS/[facility])    • Fracture of lumbar spine (CMS/[facility]) 2016   • Stroke (CMS/[facility])      Past Surgical History:   Procedure Laterality Date   • BREAST LUMPECTOMY Right    • CARDIAC SURGERY      quadruple bypass,valve repair   • EYE SURGERY      bilateral cataract surgery   • SPINAL FIXATION SURGERY W/ IMPLANT N/A        Therapy Treatment    Rehabilitation Treatment Summary     Row Name 10/02/19 1140 10/02/19 0958          Treatment Time/Intention    Discipline  occupational therapist  -RP  physical therapist  -EF     Document Type  therapy note (daily note)  -RP  therapy note (daily note)  -EF     Subjective Information  no complaints  -RP  complains of;pain  -EF     Mode of Treatment  occupational therapy  -RP  physical therapy  -EF     Patient/Family Observations  pt seated in recliner w/ no signs of acute distress; pt family member present  -RP  pt supine in bed, dgt present  -EF     Care Plan Review  evaluation/treatment results  reviewed;care plan/treatment goals reviewed;patient/other agree to care plan  -RP  --     Patient Effort  good  -RP  good  -EF     Existing Precautions/Restrictions  fall  -RP  fall  -EF     Recorded by [RP] Hazel Coker, OT 10/02/19 1221 [EF] Vibha Lr, PT 10/02/19 1000     Row Name 10/02/19 1140             Cognitive Assessment/Intervention- PT/OT    Orientation Status (Cognition)  oriented x 3  -RP      Follows Commands (Cognition)  WFL  -RP      Recorded by [RP] Hazel Coker, OT 10/02/19 1221      Row Name 10/02/19 1140 10/02/19 0958          Bed Mobility Assessment/Treatment    Supine-Sit [place] (Bed Mobility)  --  supervision;verbal cues  -EF     Assistive Device (Bed Mobility)  --  bed rails;head of bed elevated  -EF     Comment (Bed Mobility)  NT- up in chair  -RP  --     Recorded by [RP] Hazel Coker, OT 10/02/19 1221 [EF] Vibha Lr, PT 10/02/19 1000     Row Name 10/02/19 1140             Functional Mobility    Functional Mobility- Ind. Level  contact guard assist;standby assist  -RP      Functional Mobility- Comment  pt ambulates from chair <> sink w/  CGA/SBA- no LOB noted  -RP      Recorded by [RP] Hazel Coker, OT 10/02/19 1221      Row Name 10/02/19 1140 10/02/19 0958          Transfer Assessment/Treatment    Transfer Assessment/Treatment  sit-stand transfer;stand-sit transfer  -RP  sit-stand transfer;stand-sit transfer  -EF     Recorded by [RP] Hazel Coker, OT 10/02/19 1221 [EF] Vibha Lr, PT 10/02/19 1000     Row Name 10/02/19 1140 10/02/19 0958          Sit-Stand Transfer    Sit-Stand [place] (Transfers)  stand by assist;supervision  -RP  supervision  -EF     Assistive Device (Sit-Stand Transfers)  --  walker, front-wheeled  -EF     Recorded by [RP] Hazel Coker, OT 10/02/19 1221 [EF] Vibha Lr, PT 10/02/19 1000     Row Name 10/02/19 1140 10/02/19 0958          Stand-Sit Transfer    Stand-Sit [place] (Transfers)  stand by  assist;supervision  -RP  supervision  -EF     Assistive Device (Stand-Sit Transfers)  --  walker, front-wheeled  -EF     Recorded by [RP] Hazel Coker, OT 10/02/19 1221 [EF] Vibha Lr, PT 10/02/19 1000     Row Name 10/02/19 0958             Gait/Stairs Assessment/Training    [place] Level (Gait)  supervision  -EF      Assistive Device (Gait)  walker, front-wheeled  -EF      Distance in Feet (Gait)  150  -EF      Deviations/Abnormal Patterns (Gait)  gait speed decreased;stride length decreased  -EF      Recorded by [EF] Vibha Lr, PT 10/02/19 1000      Row Name 10/02/19 1140             ADL Assessment/Intervention    BADL Assessment/Intervention  grooming  -RP      Recorded by [RP] Hazel Coker, OT 10/02/19 1221      Row Name 10/02/19 1140             Grooming Assessment/Training    [place] Level (Grooming)  grooming skills;shave face;wash face, hands;set up;contact guard assist SBA  -RP      Grooming Position  sink side;unsupported standing  -RP      Comment (Grooming)  pt stood at sink to complete shaving; pt able to stand approx. 15 min at sink w/ occassional CGA - SBA required  -RP      Recorded by [RP] Hazel Coker, OT 10/02/19 1221      Row Name 10/02/19 1140 10/02/19 0958          Motor Skills Assessment/Interventions    Additional Documentation  Balance (Group)  -RP  Therapeutic Exercise (Group);Therapeutic Exercise Interventions (Group)  -EF     Recorded by [RP] Hazel Coker, OT 10/02/19 1221 [EF] Vibha Lr, PT 10/02/19 1000     Row Name 10/02/19 0958             Therapeutic Exercise    Comment (Therapeutic Exercise)  sitting LAQ, AP x 5 reps each  -EF      Recorded by [EF] Vibha Lr, PT 10/02/19 1000      Row Name 10/02/19 1140             Balance    Balance  static standing balance  -RP      Recorded by [RP] Hazel Coker, OT 10/02/19 1221      Row Name 10/02/19 1140             Static Standing Balance    Level of [place] (Supported Standing,  Static Balance)  contact guard assist;standby assist  -RP      Time Able to Maintain Position (Supported Standing, Static Balance)  more than 5 minutes  -RP      Comment (Supported Standing, Static Balance)  at sink side during shaving; able to stand approx. 15 min; chair positioned behind pt for safety   -RP      Recorded by [RP] Hazel Coker, OT 10/02/19 1221      Row Name 10/02/19 1140 10/02/19 0952          Positioning and Restraints    Pre-Treatment Position  sitting in chair/recliner  -RP  in bed  -EF     Post Treatment Position  chair  -RP  chair  -EF     In Chair  sitting;call light within reach;encouraged to call for assist;with family/caregiver  -RP  reclined;call light within reach;encouraged to call for assist;with family/caregiver  -EF     Recorded by [RP] Hazel Coker, OT 10/02/19 1221 [EF] Vibha Lr, PT 10/02/19 1000     Row Name 10/02/19 0968             Pain Assessment    Additional Documentation  Pain Scale: Word Pre/Post-Treatment (Group)  -EF      Recorded by [EF] Vibha Lr, PT 10/02/19 1000      Row Name 10/02/19 1140 10/02/19 0992          Pain Scale: Numbers Pre/Post-Treatment    Pain Scale: Numbers, Pretreatment  0/10 - no pain  -RP  --     Pain Scale: Numbers, Post-Treatment  0/10 - no pain  -RP  --     Pain Location  --  abdomen  -EF     Pain Intervention(s)  --  Repositioned;Ambulation/increased activity  -EF     Recorded by [RP] Hazel Coker, OT 10/02/19 1221 [EF] Vibha Lr, PT 10/02/19 1000     Row Name 10/02/19 0982             Pain Scale: Word Pre/Post-Treatment    Pain: Word Scale, Pretreatment  6 - moderate-severe pain  -EF      Pain: Word Scale, Post-Treatment  6 - moderate-severe pain  -EF      Recorded by [EF] Vibha Lr, PT 10/02/19 1000      Row Name 10/02/19 1140             Coping    Observed Emotional State  accepting;calm;cooperative  -RP      Verbalized Emotional State  acceptance  -RP      Recorded by [RP] Hazel Coker, OT  10/02/19 1221      Row Name 10/02/19 1140             Plan of Care Review    Plan of Care Reviewed With  patient;family  -RP      Recorded by [RP] Hazel Coker OT 10/02/19 1221      Row Name 10/02/19 1140             Outcome Summary/Treatment Plan (OT)    Anticipated Discharge Disposition (OT)  home;home with home health  -RP      Recorded by [RP] Hazel Coker OT 10/02/19 1221        User Key  (r) = Recorded By, (t) = Taken By, (c) = Cosigned By    Initials Name Effective Dates Discipline    EF Vibha Lr, PT 06/08/18 -  PT    RP Hazel Coker, OT 05/03/18 -  OT             Occupational Therapy Education     Title: PT OT SLP Therapies (Done)     Topic: Occupational Therapy (Done)     Point: ADL training (Done)     Description: Instruct learner(s) on proper safety adaptation and remediation techniques during self care or transfers.   Instruct in proper use of assistive devices.    Learning Progress Summary           Patient Acceptance, E, VU by SK at 10/1/2019 10:27 AM    Comment:  Edu pt on OT, DME, HEP, Safety, ADLs                   Point: Home exercise program (Done)     Description: Instruct learner(s) on appropriate technique for monitoring, assisting and/or progressing therapeutic exercises/activities.    Learning Progress Summary           Patient Acceptance, E, VU by SK at 10/1/2019 10:27 AM    Comment:  Edu pt on OT, DME, HEP, Safety, ADLs                   Point: Precautions (Done)     Description: Instruct learner(s) on prescribed precautions during self-care and functional transfers.    Learning Progress Summary           Patient Acceptance, E, VU by SK at 10/1/2019 10:27 AM    Comment:  Edu pt on OT, DME, HEP, Safety, ADLs                   Point: Body mechanics (Done)     Description: Instruct learner(s) on proper positioning and spine alignment during self-care, functional mobility activities and/or exercises.    Learning Progress Summary           Patient Acceptance, E, VU by SK at  10/1/2019 10:27 AM    Comment:  Edu pt on OT, DME, HEP, Safety, ADLs                               User Key     Initials Effective Dates Name Provider Type Discipline    SK 02/25/19 -  Annabelle Rincon OT Occupational Therapist OT                OT Recommendation and Plan  Outcome Summary/Treatment Plan (OT)  Anticipated Discharge Disposition (OT): home, home with home health  Plan of Care Review  Plan of Care Reviewed With: patient, family  Plan of Care Reviewed With: patient, family  Outcome Summary: Pt tolerates OT treatment well. Pt able to ambulate to sink and complete shaving while standing at sink side; pt able to stand at sink approx. 15 min w/ occassional CGA - SBA required. Will continue to progress as tolerated.   Outcome Measures     Row Name 10/02/19 1200 10/02/19 1000 10/01/19 1000       How much help from another person do you currently need...    Turning from your back to your side while in flat bed without using bedrails?  --  4  -EF  --    Moving from lying on back to sitting on the side of a flat bed without bedrails?  --  4  -EF  --    Moving to and from a bed to a chair (including a wheelchair)?  --  3  -EF  --    Standing up from a chair using your arms (e.g., wheelchair, bedside chair)?  --  4  -EF  --    Climbing 3-5 steps with a railing?  --  3  -EF  --    To walk in hospital room?  --  3  -EF  --    AM-PAC 6 Clicks Score (PT)  --  21  -EF  --       How much help from another is currently needed...    Putting on and taking off regular lower body clothing?  3  -RP  --  3  -SK    Bathing (including washing, rinsing, and drying)  3  -RP  --  3  -SK    Toileting (which includes using toilet bed pan or urinal)  3  -RP  --  3  -SK    Putting on and taking off regular upper body clothing  3  -RP  --  3  -SK    Taking care of personal grooming (such as brushing teeth)  3  -RP  --  3  -SK    Eating meals  4  -RP  --  4  -SK    AM-PAC 6 Clicks Score (OT)  19  -RP  --  19  -SK       Modified [place]  Scale    Modified [place] Scale  --  --  3 - Moderate disability.  Requiring some help, but able to walk without assistance.  -SK       Functional Assessment    Outcome Measure Options  --  AM-PAC 6 Clicks Basic Mobility (PT)  -EF  AM-PAC 6 Clicks Daily Activity (OT)  -SK      User Key  (r) = Recorded By, (t) = Taken By, (c) = Cosigned By    Initials Name Provider Type    EF Vibha Lr, PT Physical Therapist    Hazel Lugo, OT Occupational Therapist    SK Annabelle Rincon, OT Occupational Therapist           Time Calculation:   Time Calculation- OT     Row Name 10/02/19 1223             Time Calculation- OT    OT Start Time  1113  -RP      OT Stop Time  1140  -RP      OT Time Calculation (min)  27 min  -RP      Total Timed Code Minutes- OT  27 minute(s)  -RP      OT Received On  10/02/19  -        User Key  (r) = Recorded By, (t) = Taken By, (c) = Cosigned By    Initials Name Provider Type     Hazel Coker, OT Occupational Therapist        Therapy Charges for Today     Code Description Service Date Service Provider Modifiers Qty    [id number]  OT SELF CARE/MGMT/TRAIN EA 15 MIN 10/2/2019 Hazel Coker, OT GO 2               Hazel Coker OT  10/2/2019

## 2019-10-02 NOTE — NURSING NOTE
Called opthalmology consult early this AM. Spoke directly with Johnathon Temple, who said he would be in to see Mr. Maradiaag this afternoon. Called again at 7:53 PM to check on status. He said he would not be in until tomorrow morning.

## 2019-10-02 NOTE — PLAN OF CARE
Problem: Patient Care Overview  Goal: Plan of Care Review  Outcome: Ongoing (interventions implemented as appropriate)   10/02/19 1001   Coping/Psychosocial   Plan of Care Reviewed With patient   Plan of Care Review   Progress improving   OTHER   Outcome Summary Pt required less assistance for txfs & ambulation with use of rwx. Doing well except for abdominal pain.

## 2019-10-02 NOTE — THERAPY TREATMENT NOTE
Acute Care - Physical Therapy Treatment Note  [facility]     Patient Name: Ramiro Maradiaga  : 1955  MRN: [id number]  Today's Date: 10/2/2019  Onset of Illness/Injury or Date of Surgery: 19     Referring Physician: Ross     Admit Date: 2019    Visit Dx:  No diagnosis found.  Patient Active Problem List   Diagnosis   • TIA (transient ischemic attack)   • CHF (congestive heart failure) (CMS/H[facility])   • Stroke (CMS/[facility]) - history of   • History of carotid endarterectomy   • Ataxia   • Acute lacunar stroke (CMS/[facility])   • Hernia of anterior abdominal wall   • B12 deficiency   • Type 2 diabetes mellitus with hyperglycemia, without long-term current use of insulin (CMS/[facility])   • Left temporal headache   • CAD (coronary artery disease)   • Hypocalcemia       Therapy Treatment    Rehabilitation Treatment Summary     Row Name 10/02/19 0958             Treatment Time/Intention    Discipline  physical therapist  -EF      Document Type  therapy note (daily note)  -EF      Subjective Information  complains of;pain  -EF      Mode of Treatment  physical therapy  -EF      Patient/Family Observations  pt supine in bed, dgt present  -EF      Patient Effort  good  -EF      Existing Precautions/Restrictions  fall  -EF      Recorded by [EF] Vibha Lr, PT 10/02/19 1000      Row Name 10/02/19 0958             Bed Mobility Assessment/Treatment    Supine-Sit [place] (Bed Mobility)  supervision;verbal cues  -EF      Assistive Device (Bed Mobility)  bed rails;head of bed elevated  -EF      Recorded by [EF] Vibha Lr, PT 10/02/19 1000      Row Name 10/02/19 0958             Transfer Assessment/Treatment    Transfer Assessment/Treatment  sit-stand transfer;stand-sit transfer  -EF      Recorded by [EF] Vibha Lr, PT 10/02/19 1000      Row Name 10/02/19 0958             Sit-Stand Transfer    Sit-Stand [place] (Transfers)  supervision  -EF      Assistive Device (Sit-Stand Transfers)  walker,  front-wheeled  -EF      Recorded by [EF] Vibha Lr, PT 10/02/19 1000      Row Name 10/02/19 0958             Stand-Sit Transfer    Stand-Sit [place] (Transfers)  supervision  -EF      Assistive Device (Stand-Sit Transfers)  walker, front-wheeled  -EF      Recorded by [EF] Vibha Lr, PT 10/02/19 1000      Row Name 10/02/19 0958             Gait/Stairs Assessment/Training    [place] Level (Gait)  supervision  -EF      Assistive Device (Gait)  walker, front-wheeled  -EF      Distance in Feet (Gait)  150  -EF      Deviations/Abnormal Patterns (Gait)  gait speed decreased;stride length decreased  -EF      Recorded by [EF] Vibha Lr, PT 10/02/19 1000      Row Name 10/02/19 0958             Motor Skills Assessment/Interventions    Additional Documentation  Therapeutic Exercise (Group);Therapeutic Exercise Interventions (Group)  -EF      Recorded by [EF] iVbha Lr, PT 10/02/19 1000      Row Name 10/02/19 0958             Therapeutic Exercise    Comment (Therapeutic Exercise)  sitting LAQ, AP x 5 reps each  -EF      Recorded by [EF] Vibha Lr, PT 10/02/19 1000      Row Name 10/02/19 0958             Positioning and Restraints    Pre-Treatment Position  in bed  -EF      Post Treatment Position  chair  -EF      In Chair  reclined;call light within reach;encouraged to call for assist;with family/caregiver  -EF      Recorded by [EF] Vibha Lr, PT 10/02/19 1000      Row Name 10/02/19 0958             Pain Assessment    Additional Documentation  Pain Scale: Word Pre/Post-Treatment (Group)  -EF      Recorded by [EF] Vibha Lr, PT 10/02/19 1000      Row Name 10/02/19 0958             Pain Scale: Numbers Pre/Post-Treatment    Pain Location  abdomen  -EF      Pain Intervention(s)  Repositioned;Ambulation/increased activity  -EF      Recorded by [EF] Vibha Lr, PT 10/02/19 1000      Row Name 10/02/19 0958             Pain Scale: Word Pre/Post-Treatment     Pain: Word Scale, Pretreatment  6 - moderate-severe pain  -EF      Pain: Word Scale, Post-Treatment  6 - moderate-severe pain  -EF      Recorded by [EF] Vibha rL, PT 10/02/19 1000        User Key  (r) = Recorded By, (t) = Taken By, (c) = Cosigned By    Initials Name Effective Dates Discipline    EF Vibha Lr, PT 06/08/18 -  PT                   Physical Therapy Education     Title: PT OT SLP Therapies (Done)     Topic: Physical Therapy (Done)     Point: Mobility training (Done)     Learning Progress Summary           Patient Acceptance, E, VU,NR by  at 10/2/2019 10:00 AM    Acceptance, E,TB,D, VU,NR by  at 10/1/2019  8:57 AM                   Point: Home exercise program (Done)     Learning Progress Summary           Patient Acceptance, E, VU,NR by  at 10/2/2019 10:00 AM                   Point: Body mechanics (Done)     Learning Progress Summary           Patient Acceptance, E, VU,NR by  at 10/2/2019 10:00 AM                   Point: Precautions (Done)     Learning Progress Summary           Patient Acceptance, E, VU,NR by  at 10/2/2019 10:00 AM                               User Key     Initials Effective Dates Name Provider Type Discipline     06/08/18 -  Vibha Lr, PT Physical Therapist PT     04/03/18 -  Radha Kelley, PT Physical Therapist PT                PT Recommendation and Plan     Plan of Care Reviewed With: patient  Progress: improving  Outcome Summary: Pt required less assistance for txfs & ambulation with use of rwx. Doing well except for abdominal pain.  Outcome Measures     Row Name 10/02/19 1000 10/01/19 1000          How much help from another person do you currently need...    Turning from your back to your side while in flat bed without using bedrails?  4  -EF  --     Moving from lying on back to sitting on the side of a flat bed without bedrails?  4  -EF  --     Moving to and from a bed to a chair (including a wheelchair)?  3  -EF  --     Standing  up from a chair using your arms (e.g., wheelchair, bedside chair)?  4  -EF  --     Climbing 3-5 steps with a railing?  3  -EF  --     To walk in hospital room?  3  -EF  --     AM-PAC 6 Clicks Score (PT)  21  -EF  --        How much help from another is currently needed...    Putting on and taking off regular lower body clothing?  --  3  -SK     Bathing (including washing, rinsing, and drying)  --  3  -SK     Toileting (which includes using toilet bed pan or urinal)  --  3  -SK     Putting on and taking off regular upper body clothing  --  3  -SK     Taking care of personal grooming (such as brushing teeth)  --  3  -SK     Eating meals  --  4  -SK     AM-PAC 6 Clicks Score (OT)  --  19  -SK        Modified [place] Scale    Modified [place] Scale  --  3 - Moderate disability.  Requiring some help, but able to walk without assistance.  -SK        Functional Assessment    Outcome Measure Options  AM-PAC 6 Clicks Basic Mobility (PT)  -EF  AM-PAC 6 Clicks Daily Activity (OT)  -SK       User Key  (r) = Recorded By, (t) = Taken By, (c) = Cosigned By    Initials Name Provider Type    Vibha Desai, PT Physical Therapist    SK Annabelle Rincon, OT Occupational Therapist         Time Calculation:   PT Charges     Row Name 10/02/19 1002             Time Calculation    Start Time  0942  -EF      Stop Time  0956  -EF      Time Calculation (min)  14 min  -EF      PT Received On  10/02/19  -EF      PT - Next Appointment  10/03/19  -EF        User Key  (r) = Recorded By, (t) = Taken By, (c) = Cosigned By    Initials Name Provider Type    Vibha Desai, PT Physical Therapist        Therapy Charges for Today     Code Description Service Date Service Provider Modifiers Qty    66918945061 HC PT THER PROC EA 15 MIN 10/2/2019 Vibha Lr, PT GP 1          PT G-Codes  Outcome Measure Options: AM-PAC 6 Clicks Basic Mobility (PT)  AM-PAC 6 Clicks Score (PT): 21  AM-PAC 6 Clicks Score (OT): 19  Modified [place] Scale: 3 -  Moderate disability.  Requiring some help, but able to walk without assistance.    Vibha Lr, PT  10/2/2019

## 2019-10-02 NOTE — PROGRESS NOTES
SUMMARY (A/P):    64-year-old gentleman whom I been asked to see for abdominal wall hernias.  He has had no previous abdominal wall surgery and the only hernia he has is a small subxiphoid incisional hernia related to his previous coronary artery bypass surgery.  There is no specific need to fix this hernia and certainly no urgency.  The protrusion he refers to on his right flank is not a hernia.  It is fairly symmetric to the left side and becomes less prominent with Valsalva maneuvers.  He does have a small rectus diastases and I explained to him what this is and why it is not a hernia.  Lastly, he does have some asymmetric protrusion in the left upper abdomen relative to the right when straining but this also does not appear to be a hernia.  However, in order to look at that area better and overall fully assess his abdominal wall, I recommend obtaining a noncontrasted CT while he is here and will follow up on that.      CC:    Hernia    HPI:    64-year-old gentleman with mild right sided abdominal pain associated with palpable protrusion that he has had for many months.    PSH:    No previous abdominal surgery  Coronary artery bypass grafting    PMH:    Coronary artery disease  Diabetes  TIA  CVA    FAMILY HISTORY:    Reviewed and noncontributory to current presentation    SOCIAL HISTORY:   Denies tobacco use  Denies alcohol use    ALLERGIES: reviewed, in Epic    MEDICATIONS: reviewed, in Epic    ROS:  No chest pain or shortness of air.  All other systems reviewed and negative other than presenting complaints.    PHYSICAL EXAM:   Constitutional: Well-developed well-nourished, no acute distress  Vital signs: /73, HR 75, RR 16, T 97.5, weight 191 pounds, height 69 inches, BMI 27.8  Eyes: Conjunctiva normal, sclera nonicteric  ENMT: Hearing grossly normal, oral mucosa moist  Neck: Supple, no palpable mass, trachea midline  Respiratory: Clear to auscultation, normal inspiratory effort  Cardiovascular: Regular  rate, no jugular venous distention  Gastrointestinal: Soft, nontender, no palpable mass, no hepatosplenomegaly.  He has a small incisional hernia at the inferior aspect of his sternotomy incision that is easily reducible.  He has a small rectus diastases.  There is small visible and palpable asymmetry of the left upper abdominal wall relative to the right when he strains his abdominal wall but it does not feel like a mass nor does it feel like a hernia.  The area that he complains of most is the right flank region that does protrude when he goes from lying to supine position and when he is relaxed.  However, when he strains this completely resolves.  Also, the finding is very symmetric from left to right.  Lymphatics (palpable nodes):  cervical-negative, inguinal-negative  Skin:  Warm, dry, no rash on visualized skin surfaces  Musculoskeletal: Symmetric strength, normal gait  Psychiatric: Alert and oriented ×3, normal affect     ROSEY JETT M.D.

## 2019-10-02 NOTE — PROGRESS NOTES
DOS: 10/2/2019  NAME: Ramiro Maradiaga   : 1955  PCP: Heidi Jalloh, LEONEL    No chief complaint on file.  CC: blurred vision, ataxia, left jaw pain, dysphasia     Stroke    Subjective: No acute events overnight.  Remains with abdominal pain due to his hernias, awaiting surgery eval. Denies any new weakness, numbness, speech or visual disturbances or headaches. Still having some decreased sensation on his left side (upper and lower extremity) intermittent jaw pain and blurred vision with migraine. Did better with walking today with therapy. Daughter at bedside.    Objective:  Vital signs:      Vitals:    10/01/19 1943 10/01/19 2300 10/02/19 0500 10/02/19 0737   BP: 133/91 128/85  119/73   BP Location: Right arm Right arm  Right arm   Patient Position: Lying Lying  Lying   Pulse: 85 83  75   Resp: 18 18  16   Temp: 97.2 °F (36.2 °C) 97.4 °F (36.3 °C)  97.5 °F (36.4 °C)   TempSrc: Oral Oral  Oral   SpO2:  90%  93%   Weight:   86.9 kg (191 lb 8 oz)    Height:           Current Facility-Administered Medications:   •  acetaminophen (TYLENOL) tablet 650 mg, 650 mg, Oral, Q4H PRN, 650 mg at 10/01/19 1535 **OR** acetaminophen (TYLENOL) suppository 650 mg, 650 mg, Rectal, Q4H PRN, Chang Hawkins MD  •  aspirin chewable tablet 81 mg, 81 mg, Oral, Daily, 81 mg at 10/02/19 0925 **OR** aspirin suppository 300 mg, 300 mg, Rectal, Daily, Chang Hawkins MD  •  atorvastatin (LIPITOR) tablet 80 mg, 80 mg, Oral, Nightly, Chang Hawkins MD, 80 mg at 10/01/19 2107  •  cyanocobalamin injection 1,000 mcg, 1,000 mcg, Intramuscular, Daily, Mode Messina MD, 1,000 mcg at 10/02/19 0926  •  dextrose (D50W) 25 g/ 50mL Intravenous Solution 25 g, 25 g, Intravenous, Q15 Min PRN, Jeffery Alcaraz MD  •  dextrose (GLUTOSE) oral gel 15 g, 15 g, Oral, Q15 Min PRN, Jeffery Alcaraz MD  •  DULoxetine (CYMBALTA) DR capsule 60 mg, 60 mg, Oral, Nightly, Jeffery Alcaraz MD, 60 mg at 10/01/19 2107  •  famotidine (PEPCID) tablet 20 mg,  20 mg, Oral, BID AC, Ricardo Mead MD, 20 mg at 10/02/19 0925  •  gabapentin (NEURONTIN) capsule 400 mg, 400 mg, Oral, TID, Jeffery Alcaraz MD, 400 mg at 10/02/19 0925  •  glucagon (human recombinant) (GLUCAGEN DIAGNOSTIC) injection 1 mg, 1 mg, Subcutaneous, Q15 Min PRN, Jeffery Alcaraz MD  •  insulin lispro (humaLOG) injection 0-14 Units, 0-14 Units, Subcutaneous, 4x Daily With Meals & Nightly, Mode Messina MD, 10 Units at 10/02/19 0925  •  ondansetron (ZOFRAN) injection 4 mg, 4 mg, Intravenous, Q6H PRN, Chang Hawkins MD  •  oxyCODONE-acetaminophen (PERCOCET) 7.5-325 MG per tablet 1 tablet, 1 tablet, Oral, BID, Jeffery Alcaraz MD, 1 tablet at 10/02/19 0925  •  predniSONE (DELTASONE) tablet 60 mg, 60 mg, Oral, Daily, Ricardo Mead MD, 60 mg at 10/02/19 0925  •  sodium chloride 0.9 % flush 10 mL, 10 mL, Intravenous, Q12H, Chang Hawkins MD, 10 mL at 10/02/19 0926  •  sodium chloride 0.9 % flush 10 mL, 10 mL, Intravenous, PRN, Chang Hawkins MD    PRN meds  •  acetaminophen **OR** acetaminophen  •  dextrose  •  dextrose  •  glucagon (human recombinant)  •  ondansetron  •  sodium chloride    No current facility-administered medications on file prior to encounter.      Current Outpatient Medications on File Prior to Encounter   Medication Sig   • DULoxetine (CYMBALTA) 60 MG capsule Take 60 mg by mouth Every Night.   • gabapentin (NEURONTIN) 400 MG capsule Take 400 mg by mouth 3 (Three) Times a Day.   • metFORMIN (GLUCOPHAGE) 500 MG tablet Take 500 mg by mouth 2 (Two) Times a Day With Meals.   • oxyCODONE-acetaminophen (PERCOCET) 7.5-325 MG per tablet Take 1 tablet by mouth 2 (Two) Times a Day.     General appearance: NAD, alert and cooperative  HEENT: Normocephalic, atraumatic, PERRL  COR: RRR  Resp: Even and unlabored  Extremities: no edema  Skin: warm, dry     Neurological:   MS: oriented x3, recent/remote memory intact, normal attention/concentration, language intact, no  neglect, normal fund of knowledge  CN: visual acuity grossly normal, visual fields full, PERRL, EOMI, decreased left facial sensation to light touch, no facial droop, hearing symmetric, palate elevates symmetrically, shoulder shrug equal, tongue midline  Motor: 5/5 in all 4 ext., normal tone  Reflexes: 1+ in all ext. Except LUE areflexic  Sensory: light touch sensation decreased on LUE/LLE  Coordination: Normal finger to nose test  Gait and station: antalgic gait  Rapid alternating movements: normal finger to thumb tap    Physical exam performed, changes noted.    Laboratory results:  Lab Results   Component Value Date    TSH 0.662 10/01/2019     Lab Results   Component Value Date    HGBA1C 9.50 (H) 10/01/2019     Lab Results   Component Value Date    BAAZYCCN50 314 10/01/2019     Lab Results   Component Value Date    CHOL 226 (H) 10/01/2019     Lab Results   Component Value Date    TRIG 144 10/01/2019     Lab Results   Component Value Date    HDL 41 10/01/2019     Lab Results   Component Value Date     (H) 10/01/2019     Lab Results   Component Value Date    WBC 16.59 (H) 10/02/2019    HGB 14.7 10/02/2019    HCT 43.5 10/02/2019    MCV 94.0 10/02/2019     10/02/2019     Lab Results   Component Value Date    GLUCOSE 338 (H) 10/02/2019    BUN 15 10/02/2019    CREATININE 0.96 10/02/2019    EGFRIFNONA 79 10/02/2019    BCR 15.6 10/02/2019    K 4.5 10/02/2019    CO2 28.2 10/02/2019    CALCIUM 9.0 10/02/2019    ALBUMIN 4.00 10/02/2019    AST 9 10/02/2019    ALT 8 10/02/2019     No results found for: PTT  Lab Results   Component Value Date    INR 1.02 09/30/2019    PROTIME 13.1 09/30/2019     Brief Urine Lab Results     None          Review and interpretation of imaging:  Mri Brain With & Without Contrast    Result Date: 10/1/2019  Small vessel ischemic disease with no evidence of acute infarction, mass or of abnormal enhancement. A small lacunar infarct involving the thalamus on the left is appreciated. There  is a trace amount of fluid present within the mastoid process on the right.    This report was finalized on 10/1/2019 3:05 PM by Dr. Carl Garcia M.D.      Ct Angiogram Carotids    Result Date: 10/2/2019  1. CT images through the head are without change when compared to MRI of the brain earlier in the day 10/01/2019 at 6:00 AM. There is mild small vessel disease in the cerebral white matter and an 8 x 6 mm old lacunar type infarct in the lateral left thalamus. The tiny 7 x 4 mm old posterior superior right frontal cortical infarct is better seen on prior MRI than on the current CT angiogram, it is in the distribution of the posterior superior frontal branch of the right MCA territory. No acute intracranial abnormality is seen with no acute infarct or intracranial hemorrhage seen.  2. CT angiogram images through the neck demonstrate circumferential atherosclerotic plaque resulting in very mild less than 20% stenosis proximal left internal carotid artery using the NASCET criteria, otherwise no stenosis is seen in the great vessels of the neck.  3. CT angiogram images through the head demonstrate mild narrowing of the intracranial segment of the dominant distal left vertebral artery. There is moderate-to-severe circumferential narrowing of the proximal intracranial segment of the distal right vertebral artery, then it is widely open to the right PICA origin. The immediate post PICA segment has a moderate-to-severe stenosis but it is patent to the vertebrobasilar junction. There is mild-to-moderate stenosis of the proximal P3 segment of the right posterior cerebral artery. The findings are on the basis of some intracranial atherosclerotic disease.  4. There has been previous cervical spine surgery, the anterior discectomy and fusion procedure C5-C7. There are areas of residual cervical canal and foraminal narrowing as described above. The remainder of the CT angiogram of the head and neck is unremarkable.   Radiation  dose reduction techniques were utilized, including automated exposure control and exposure modulation based on body size.  This report was finalized on 10/2/2019 9:43 AM by Dr. Ankur Diane M.D.      Ct Angiogram Head With Contrast    Result Date: 10/2/2019  1. CT images through the head are without change when compared to MRI of the brain earlier in the day 10/01/2019 at 6:00 AM. There is mild small vessel disease in the cerebral white matter and an 8 x 6 mm old lacunar type infarct in the lateral left thalamus. The tiny 7 x 4 mm old posterior superior right frontal cortical infarct is better seen on prior MRI than on the current CT angiogram, it is in the distribution of the posterior superior frontal branch of the right MCA territory. No acute intracranial abnormality is seen with no acute infarct or intracranial hemorrhage seen.  2. CT angiogram images through the neck demonstrate circumferential atherosclerotic plaque resulting in very mild less than 20% stenosis proximal left internal carotid artery using the NASCET criteria, otherwise no stenosis is seen in the great vessels of the neck.  3. CT angiogram images through the head demonstrate mild narrowing of the intracranial segment of the dominant distal left vertebral artery. There is moderate-to-severe circumferential narrowing of the proximal intracranial segment of the distal right vertebral artery, then it is widely open to the right PICA origin. The immediate post PICA segment has a moderate-to-severe stenosis but it is patent to the vertebrobasilar junction. There is mild-to-moderate stenosis of the proximal P3 segment of the right posterior cerebral artery. The findings are on the basis of some intracranial atherosclerotic disease.  4. There has been previous cervical spine surgery, the anterior discectomy and fusion procedure C5-C7. There are areas of residual cervical canal and foraminal narrowing as described above. The remainder of the CT  angiogram of the head and neck is unremarkable.   Radiation dose reduction techniques were utilized, including automated exposure control and exposure modulation based on body size.  This report was finalized on 10/2/2019 9:43 AM by Dr. Ankur Diane M.D.      Results for orders placed during the hospital encounter of 09/30/19   Adult Transthoracic Echo Complete W/ Cont if Necessary Per Protocol (With Agitated Saline)    Narrative · Calculated EF = 56.0%. Estimated EF was in agreement with the calculated   EF. Normal left ventricular cavity size and wall thickness noted. All left   ventricular wall segments contract normally. Left ventricular diastolic   dysfunction is noted (grade I) consistent with impaired relaxation. There   is no evidence of a left ventricular mass or thrombus present.  · No evidence of a patent foramen ovale. No evidence of an atrial septal   defect present. . Saline test results are negative.          Impression/Assessment:  This is a 64-year-old male with a past medical history of CHF, CAD, diabetes, lumbar spine fracture, stroke with prior left CEA not on any anticoagulation or antiplatelets prior to admission, migraine who presented to the hospital after being transferred from [facility] with complaints of ataxia, vertigo, left jaw pain, intermittent blurred vision in the left eye, scalp tenderness on the left.  MRI at the outside hospital was negative, reviewed by Dr. Mead. Blood pressure on arrival 122/95 with a heart rate of 76.  EKG revealed a predominantly normal sinus rhythm.  Blood sugar 213.  ESR 12, CRP 0.12.  MRI brain with no acute infarct noted, old lacunar infarct with some moderate small vessel disease.     1.  Left-sided numbness  2.  Ataxia, resolved  3.  Chronic migraine   4.  Left eye visual disturbance  5.  History of stroke   6.  History of Left carotid endarterectomy    CTA head and neck reviewed, no acute infarct noted, there is right vertebral stenosis.  He was  "previously put on Plavix per his PCP for what he was told for his plaque in his neck but stopped taking it once his prescription ran out due to not finding a new PCP, per him. I will restart his Plavix and stop his ASA as he also reported that he was taken off ASA years ago due to bleeding, he did tolerate Plavix. Will start Plavix 75mg and he can continue Lipitor 80 mg.  2D echo revealed a normal LA size, no evidence PFO, saline test negative, EF 56%, normal LV function, no aortic valve stenosis present.  B12 is low normal, primary has initiated replacement.  TSH normal.  Awaiting ophthalmology, if they do not find anything on their exam this is likely a complex migraine as patient reports similar symptoms in the past. Will D/C prednisone.  He is currently on Gabapentin 400mg TID as well as percocet for his chronic neck and back pain and has received epidural injections per his pain management physician. He reports he takes his percocet usually for his migraine as well. I did inform him narcotics cause rebound headaches and will not help with his migraines. Could possibly start him on Topamax or Elavil for migraine prevention but obviously would not want to initiate while he is on that high of a dose of Gabapentin for CNS effects. As far as his left sided paresthesias, may be radiculopathy given his history of lumbar and cervical fractures. Would recommend that he continue with therapy, his gait is improving. He has had decreased  and sensation in his left hand after experiencing an \"air bubble\" injection with his epidural that he reports made him bed bound for almost a year. He is still complaining of abdominal pain due to his hernias, general surgery eval pending. Other plans as stated below.Therapies as written. CCP for discharge planning. Call RRT for any acute neurological changes. We will continue to follow and advise.    Plan:  D/C ASA  Start Plavix 75mg daily  Lipitor 80mg,   D/C Prednisone, " Optho eval pending  Neurochecks per stroke protocol  Normalize BP  Stroke Education  LEYDI/SCDs  PT/OT/ST  Will follow.    Case discussed with patient, daughter, Dr. Messina, and Dr. Mead, and he agrees with plan above.  LEONEL Noriega

## 2019-10-02 NOTE — PLAN OF CARE
Problem: Patient Care Overview  Goal: Plan of Care Review  Outcome: Ongoing (interventions implemented as appropriate)   10/02/19 1222   Coping/Psychosocial   Plan of Care Reviewed With patient;family   Plan of Care Review   Progress improving   OTHER   Outcome Summary Pt tolerates OT treatment well. Pt able to ambulate to sink and complete shaving while standing at sink side; pt able to stand at sink approx. 15 min w/ occassional CGA - SBA required. Will continue to progress as tolerated.

## 2019-10-02 NOTE — THERAPY TREATMENT NOTE
Acute Care - Speech Language Pathology   Swallow Initial Evaluation [facility]     Patient Name: Ramiro Maradiaga  : 1955  MRN: [id number]  Today's Date: 10/2/2019  Onset of Illness/Injury or Date of Surgery: 19     Referring Physician: Ross       Admit Date: 2019    Visit Dx:   No diagnosis found.  Patient Active Problem List   Diagnosis   • TIA (transient ischemic attack)   • CHF (congestive heart failure) (CMS/HCC)   • Stroke (CMS/HCC) - history of   • History of carotid endarterectomy   • Ataxia   • Acute lacunar stroke (CMS/HCC)   • Hernia of anterior abdominal wall   • B12 deficiency   • Type 2 diabetes mellitus with hyperglycemia, without long-term current use of insulin (CMS/HCC)   • Left temporal headache   • CAD (coronary artery disease)   • Hypocalcemia     Past Medical History:   Diagnosis Date   • Arthritis    • CHF (congestive heart failure) (CMS/HCC)    • Coronary artery disease    • Diabetes mellitus (CMS/HCC)    • Fracture cervical vertebra-closed (CMS/HCC)    • Fracture of lumbar spine (CMS/HCC) 2016   • Stroke (CMS/HCC)      Past Surgical History:   Procedure Laterality Date   • BREAST LUMPECTOMY Right    • CARDIAC SURGERY      quadruple bypass,valve repair   • EYE SURGERY      bilateral cataract surgery   • SPINAL FIXATION SURGERY W/ IMPLANT N/A         SWALLOW EVALUATION (last 72 hours)      SLP Adult Swallow Evaluation     Row Name 10/02/19 1100                   Rehab Evaluation    Document Type  evaluation  -AW        Subjective Information  no complaints  -AW        Patient Observations  alert;cooperative;agree to therapy  -AW        Patient/Family Observations  Pt up in recliner, daughter present.  -AW        Patient Effort  good  -AW        Symptoms Noted During/After Treatment  none  -AW           General Information    Patient Profile Reviewed  yes  -AW        Pertinent History Of Current Problem  Pt admitted with L LE weakness and difficulty speaking. MRI showed R  lacunar infarct. Pt has a h/o CVA (1992), CAD, and large hernias.  -AW        Current Method of Nutrition  regular textures;thin liquids  -AW        Precautions/Limitations, Vision  WFL;for purposes of eval  -AW        Precautions/Limitations, Hearing  WFL  -AW        Prior Level of Function-Communication  WFL  -AW        Prior Level of Function-Swallowing  no diet consistency restrictions  -AW        Plans/Goals Discussed with  patient;family;agreed upon  -AW        Barriers to Rehab  none identified  -AW        Patient's Goals for Discharge  return to all previous roles/activities  -AW           Pain Assessment    Additional Documentation  Pain Scale: Numbers Pre/Post-Treatment (Group)  -AW           Pain Scale: Numbers Pre/Post-Treatment    Pain Scale: Numbers, Pretreatment  0/10 - no pain  -AW        Pain Scale: Numbers, Post-Treatment  0/10 - no pain  -AW           Oral Motor and Function    Dentition Assessment  edentulous, does not have dentures  -AW        Secretion Management  WNL/WFL  -AW        Mucosal Quality  moist, healthy  -AW        Volitional Swallow  WFL  -AW        Volitional Cough  WFL  -AW           Oral Musculature and Cranial Nerve Assessment    Oral Motor General Assessment  WFL  -AW           General Eating/Swallowing Observations    Respiratory Support Currently in Use  room air  -AW        Eating/Swallowing Skills  self-fed  -AW        Positioning During Eating  upright in chair  -AW        Utensils Used  spoon;cup;straw  -AW        Consistencies Trialed  regular textures;soft textures;pureed;thin liquids mixed  -AW           Clinical Swallow Eval    Oral Prep Phase  WFL  -AW        Oral Transit  WFL  -AW        Oral Residue  WFL  -AW        Pharyngeal Phase  no overt signs/symptoms of pharyngeal impairment  -AW        Clinical Swallow Evaluation Summary  Pt took all trials including thin (cup/straw), pureed, soft, mixed, and regular solids with no s/s noted. Laryngeal elevation appeared  adequate with swallow timely. Pt reported occasional problems with food feeling stuck, likely related to large hernias present. Speech disturbances have resolved.   -AW           Clinical Impression    SLP Swallowing Diagnosis  functional oral phase;functional pharyngeal phase  -AW        Functional Impact  no impact on function  -AW        Swallow Criteria for Skilled Therapeutic Interventions Met  no problems identified which require skilled intervention  -AW           Recommendations    Therapy Frequency (Swallow)  evaluation only  -AW        SLP Diet Recommendation  regular textures;thin liquids  -AW        Recommended Precautions and Strategies  upright posture during/after eating;small bites of food and sips of liquid  -AW        SLP Rec. for Method of Medication Administration  meds whole;with thin liquids  -AW        Monitor for Signs of Aspiration  yes;notify SLP if any concerns  -AW        Anticipated Dischage Disposition  home  -AW          User Key  (r) = Recorded By, (t) = Taken By, (c) = Cosigned By    Initials Name Effective Dates    Tereza Wells, MS CCC-SLP 06/08/18 -           EDUCATION  The patient has been educated in the following areas:   Dysphagia (Swallowing Impairment) Oral Care/Hydration.    SLP Recommendation and Plan  SLP Swallowing Diagnosis: functional oral phase, functional pharyngeal phase  SLP Diet Recommendation: regular textures, thin liquids  Recommended Precautions and Strategies: upright posture during/after eating, small bites of food and sips of liquid  SLP Rec. for Method of Medication Administration: meds whole, with thin liquids     Monitor for Signs of Aspiration: yes, notify SLP if any concerns     Swallow Criteria for Skilled Therapeutic Interventions Met: no problems identified which require skilled intervention  Anticipated Dischage Disposition: home     Therapy Frequency (Swallow): evaluation only          Outcome Summary: Bedside Swallow Eval completed. No s/s of  aspiration noted. Speech deficits have resolved. Recommend pt continue on a regular diet. ST is not indicated at this time. Please reconsult if further needs arise. Thank you.         SLP Outcome Measures (last 72 hours)      SLP Outcome Measures     Row Name 10/02/19 1100             SLP Outcome Measures    Outcome Measure Used?  Adult NOMS  -AW         Adult FCM Scores    FCM Chosen  Swallowing  -AW      Swallowing FCM Score  7  -AW        User Key  (r) = Recorded By, (t) = Taken By, (c) = Cosigned By    Initials Name Effective Dates    Tereza Wells MS CCC-SLP 06/08/18 -            Time Calculation:   Time Calculation- SLP     Row Name 10/02/19 1129             Time Calculation- SLP    SLP Start Time  1000  -AW      SLP Received On  10/02/19  -        User Key  (r) = Recorded By, (t) = Taken By, (c) = Cosigned By    Initials Name Provider Type    Tereza Wells MS CCC-SLP Speech and Language Pathologist          Therapy Charges for Today     Code Description Service Date Service Provider Modifiers Qty    [id number] HC ST EVAL ORAL PHARYNG SWALLOW 4 10/2/2019 Tereza Barajas MS CCC-SLP GN 1               Tereza Barajas MS CCC-SLP  10/2/2019

## 2019-10-02 NOTE — PLAN OF CARE
Problem: Patient Care Overview  Goal: Plan of Care Review  Outcome: Ongoing (interventions implemented as appropriate)    Goal: Discharge Needs Assessment  Outcome: Ongoing (interventions implemented as appropriate)    Goal: Interprofessional Rounds/Family Conf  Outcome: Ongoing (interventions implemented as appropriate)      Problem: Fall Risk (Adult)  Goal: Absence of Fall  Outcome: Ongoing (interventions implemented as appropriate)      Problem: Stroke (Ischemic) (Adult)  Goal: Signs and Symptoms of Listed Potential Problems Will be Absent, Minimized or Managed (Stroke)  Outcome: Ongoing (interventions implemented as appropriate)

## 2019-10-03 VITALS
OXYGEN SATURATION: 95 % | HEART RATE: 88 BPM | BODY MASS INDEX: 27.16 KG/M2 | SYSTOLIC BLOOD PRESSURE: 132 MMHG | RESPIRATION RATE: 18 BRPM | TEMPERATURE: 98.3 F | WEIGHT: 189.7 LBS | HEIGHT: 70 IN | DIASTOLIC BLOOD PRESSURE: 86 MMHG

## 2019-10-03 LAB
ANION GAP SERPL CALCULATED.3IONS-SCNC: 8.6 MMOL/L (ref 5–15)
BUN BLD-MCNC: 17 MG/DL (ref 8–23)
BUN/CREAT SERPL: 20.5 (ref 7–25)
CALCIUM SPEC-SCNC: 8.6 MG/DL (ref 8.6–10.5)
CHLORIDE SERPL-SCNC: 95 MMOL/L (ref 98–107)
CO2 SERPL-SCNC: 30.4 MMOL/L (ref 22–29)
CREAT BLD-MCNC: 0.83 MG/DL (ref 0.76–1.27)
DEPRECATED RDW RBC AUTO: 45.8 FL (ref 37–54)
ERYTHROCYTE [DISTWIDTH] IN BLOOD BY AUTOMATED COUNT: 13.4 % (ref 12.3–15.4)
GFR SERPL CREATININE-BSD FRML MDRD: 93 ML/MIN/1.73
GLUCOSE BLD-MCNC: 328 MG/DL (ref 65–99)
GLUCOSE BLDC GLUCOMTR-MCNC: 316 MG/DL (ref 70–130)
GLUCOSE BLDC GLUCOMTR-MCNC: 330 MG/DL (ref 70–130)
GLUCOSE BLDC GLUCOMTR-MCNC: 408 MG/DL (ref 70–130)
HCT VFR BLD AUTO: 41.8 % (ref 37.5–51)
HGB BLD-MCNC: 13.9 G/DL (ref 13–17.7)
MCH RBC QN AUTO: 31 PG (ref 26.6–33)
MCHC RBC AUTO-ENTMCNC: 33.3 G/DL (ref 31.5–35.7)
MCV RBC AUTO: 93.1 FL (ref 79–97)
PLATELET # BLD AUTO: 153 10*3/MM3 (ref 140–450)
PMV BLD AUTO: 10.8 FL (ref 6–12)
POTASSIUM BLD-SCNC: 4.4 MMOL/L (ref 3.5–5.2)
RBC # BLD AUTO: 4.49 10*6/MM3 (ref 4.14–5.8)
SODIUM BLD-SCNC: 134 MMOL/L (ref 136–145)
WBC NRBC COR # BLD: 15.12 10*3/MM3 (ref 3.4–10.8)

## 2019-10-03 PROCEDURE — 63710000001 INSULIN LISPRO (HUMAN) PER 5 UNITS: Performed by: HOSPITALIST

## 2019-10-03 PROCEDURE — 99231 SBSQ HOSP IP/OBS SF/LOW 25: CPT | Performed by: SURGERY

## 2019-10-03 PROCEDURE — 97110 THERAPEUTIC EXERCISES: CPT

## 2019-10-03 PROCEDURE — 82962 GLUCOSE BLOOD TEST: CPT

## 2019-10-03 PROCEDURE — 80048 BASIC METABOLIC PNL TOTAL CA: CPT | Performed by: HOSPITALIST

## 2019-10-03 PROCEDURE — 63710000001 PREDNISONE PER 5 MG: Performed by: NURSE PRACTITIONER

## 2019-10-03 PROCEDURE — 97530 THERAPEUTIC ACTIVITIES: CPT

## 2019-10-03 PROCEDURE — 85027 COMPLETE CBC AUTOMATED: CPT | Performed by: HOSPITALIST

## 2019-10-03 PROCEDURE — 25010000002 CYANOCOBALAMIN PER 1000 MCG: Performed by: HOSPITALIST

## 2019-10-03 RX ORDER — PREDNISONE 20 MG/1
60 TABLET ORAL DAILY
Qty: 30 TABLET | Refills: 0 | Status: SHIPPED | OUTPATIENT
Start: 2019-10-03 | End: 2019-10-13

## 2019-10-03 RX ORDER — FAMOTIDINE 20 MG/1
20 TABLET, FILM COATED ORAL
Qty: 60 TABLET | Refills: 0 | Status: SHIPPED | OUTPATIENT
Start: 2019-10-03

## 2019-10-03 RX ORDER — ATORVASTATIN CALCIUM 80 MG/1
80 TABLET, FILM COATED ORAL NIGHTLY
Qty: 30 TABLET | Refills: 0 | Status: SHIPPED | OUTPATIENT
Start: 2019-10-03

## 2019-10-03 RX ORDER — CLOPIDOGREL BISULFATE 75 MG/1
75 TABLET ORAL DAILY
Qty: 30 TABLET | Refills: 0 | Status: SHIPPED | OUTPATIENT
Start: 2019-10-09

## 2019-10-03 RX ADMIN — GABAPENTIN 400 MG: 400 CAPSULE ORAL at 17:02

## 2019-10-03 RX ADMIN — SODIUM CHLORIDE, PRESERVATIVE FREE 10 ML: 5 INJECTION INTRAVENOUS at 09:04

## 2019-10-03 RX ADMIN — INSULIN LISPRO 10 UNITS: 100 INJECTION, SOLUTION INTRAVENOUS; SUBCUTANEOUS at 12:45

## 2019-10-03 RX ADMIN — INSULIN LISPRO 10 UNITS: 100 INJECTION, SOLUTION INTRAVENOUS; SUBCUTANEOUS at 09:04

## 2019-10-03 RX ADMIN — FAMOTIDINE 20 MG: 20 TABLET, FILM COATED ORAL at 17:02

## 2019-10-03 RX ADMIN — GABAPENTIN 400 MG: 400 CAPSULE ORAL at 09:04

## 2019-10-03 RX ADMIN — CYANOCOBALAMIN 1000 MCG: 1000 INJECTION, SOLUTION INTRAMUSCULAR; SUBCUTANEOUS at 09:04

## 2019-10-03 RX ADMIN — PREDNISONE 60 MG: 50 TABLET ORAL at 14:15

## 2019-10-03 RX ADMIN — FAMOTIDINE 20 MG: 20 TABLET, FILM COATED ORAL at 06:45

## 2019-10-03 RX ADMIN — INSULIN LISPRO 12 UNITS: 100 INJECTION, SOLUTION INTRAVENOUS; SUBCUTANEOUS at 18:03

## 2019-10-03 RX ADMIN — OXYCODONE HYDROCHLORIDE AND ACETAMINOPHEN 1 TABLET: 7.5; 325 TABLET ORAL at 09:04

## 2019-10-03 RX ADMIN — CLOPIDOGREL 75 MG: 75 TABLET, FILM COATED ORAL at 09:04

## 2019-10-03 NOTE — PROGRESS NOTES
IMPRESSION & PLAN:  64-year-old gentleman who has a small subxiphoid hernia from his previous median sternotomy.  This does not require urgent repair, or for that matter repair at all unless it enlarges or becomes symptomatic.  CT scan yesterday confirmed my clinical impression that he does not have any other hernias or abdominal wall abnormalities.  I will be available to see again if needed.    CC: Hernia follow-up    HPI: No change from yesterday    PE:    Awake, alert  Abdomen:Soft and nontender     RADIOLOGY:  CT abdomen pelvis yesterday showed no acute abnormality.  On my review of the images you can see a small defect in the subxiphoid region where he has a small incisional hernia from his previous median sternotomy.  The remainder of the abdominal wall appears normal with no defect and no evidence of hernia

## 2019-10-03 NOTE — CONSULTS
OPHTHALMOLOGY CONSULT NOTE    Patient Identification:  Name: Ramiro Maradiaga  Age: 64 y.o.  Sex: male  :  1955  MRN: [id number]                                               Requesting Physician: per order  Reason for consult: evaluate for giant cell arteritis    History of Present Illness:  64 y.o. male with history as noted below presents with left sided headache and vision changes as well as concern for TIA vs stroke. Reports headache is associated with left sided scalp and temporal tenderness. His vision will be blurred on occasion and normal on occasion. He does not recognize a pattern of visual changes. He denies vision loss. Denies jaw claudication, night sweats, hip or shoulder girdle pain, weight loss, fatigue.      Problem List:  Principal Problem:    TIA (transient ischemic attack)  Active Problems:    CHF (congestive heart failure) (CMS/[facility])    Stroke (CMS/[facility]) - history of    History of carotid endarterectomy    Ataxia    Acute lacunar stroke (CMS/[facility])    B12 deficiency    Type 2 diabetes mellitus with hyperglycemia, without long-term current use of insulin (CMS/[facility])    Left temporal headache    CAD (coronary artery disease)    Hypocalcemia    Migraine      Past Medical History:  Past Medical History:   Diagnosis Date   • Arthritis    • CHF (congestive heart failure) (CMS/[facility])    • Coronary artery disease    • Diabetes mellitus (CMS/[facility])    • Fracture cervical vertebra-closed (CMS/[facility])    • Fracture of lumbar spine (CMS/[facility]) 2016   • Stroke (CMS/[facility])        Past Surgical History:  Past Surgical History:   Procedure Laterality Date   • BREAST LUMPECTOMY Right    • CARDIAC SURGERY      quadruple bypass,valve repair   • EYE SURGERY      bilateral cataract surgery   • SPINAL FIXATION SURGERY W/ IMPLANT N/A         Past Ocular History:    ROS:  Pertinent items are noted in HPI    Eyes: negative  Ocular Medication: none     Home Meds:  Medications Prior to Admission   Medication Sig Dispense Refill Last  Dose   • DULoxetine (CYMBALTA) 60 MG capsule Take 60 mg by mouth Every Night.   9/29/2019 at Unknown time   • gabapentin (NEURONTIN) 400 MG capsule Take 400 mg by mouth 3 (Three) Times a Day.   9/29/2019 at Unknown time   • metFORMIN (GLUCOPHAGE) 500 MG tablet Take 500 mg by mouth 2 (Two) Times a Day With Meals.   9/29/2019 at Unknown time   • oxyCODONE-acetaminophen (PERCOCET) 7.5-325 MG per tablet Take 1 tablet by mouth 2 (Two) Times a Day.   9/29/2019 at Unknown time       Current Meds:     Current Facility-Administered Medications:   •  acetaminophen (TYLENOL) tablet 650 mg, 650 mg, Oral, Q4H PRN, 650 mg at 10/01/19 1535 **OR** acetaminophen (TYLENOL) suppository 650 mg, 650 mg, Rectal, Q4H PRN, Chang Hawkins MD  •  atorvastatin (LIPITOR) tablet 80 mg, 80 mg, Oral, Nightly, Chang Hawkins MD, 80 mg at 10/02/19 2026  •  clopidogrel (PLAVIX) tablet 75 mg, 75 mg, Oral, Daily, Jennifer Damon, APRN, 75 mg at 10/03/19 0904  •  dextrose (D50W) 25 g/ 50mL Intravenous Solution 25 g, 25 g, Intravenous, Q15 Min PRN, Jeffery Alcaraz MD  •  dextrose (GLUTOSE) oral gel 15 g, 15 g, Oral, Q15 Min PRN, Jeffery Alcaraz MD  •  DULoxetine (CYMBALTA) DR capsule 60 mg, 60 mg, Oral, Nightly, Jeffery Alcaraz MD, 60 mg at 10/02/19 2026  •  famotidine (PEPCID) tablet 20 mg, 20 mg, Oral, BID AC, Ricardo Mead MD, 20 mg at 10/03/19 0645  •  gabapentin (NEURONTIN) capsule 400 mg, 400 mg, Oral, TID, Jeffery Alcaraz MD, 400 mg at 10/03/19 0904  •  glucagon (human recombinant) (GLUCAGEN DIAGNOSTIC) injection 1 mg, 1 mg, Subcutaneous, Q15 Min PRN, Jeffery Alcaraz MD  •  insulin lispro (humaLOG) injection 0-14 Units, 0-14 Units, Subcutaneous, 4x Daily With Meals & Nightly, Mode Messina MD, 10 Units at 10/03/19 0904  •  ondansetron (ZOFRAN) injection 4 mg, 4 mg, Intravenous, Q6H PRN, Chang Hawkins MD  •  oxyCODONE-acetaminophen (PERCOCET) 7.5-325 MG per tablet 1 tablet, 1 tablet, Oral, BID, Jeffery Alcaraz MD, 1 tablet at  10/03/19 0904  •  predniSONE (DELTASONE) tablet 60 mg, 60 mg, Oral, Daily, Jennifer Damon APRN  •  sodium chloride 0.9 % flush 10 mL, 10 mL, Intravenous, Q12H, Chang Hawkins MD, 10 mL at 10/03/19 0904  •  sodium chloride 0.9 % flush 10 mL, 10 mL, Intravenous, PRN, Chang Hawkins MD    Allergies:  Allergies   Allergen Reactions   • Elavil [Amitriptyline Hcl] Rash       Social History:   Social History     Tobacco Use   • Smoking status: Never Smoker   • Smokeless tobacco: Never Used   Substance Use Topics   • Alcohol use: No     Frequency: Never        Family History:  Denies h/o glaucoma, retinal detachment, strabismus, amblyopia    Objective:  General Appearance: NAD    Exam:    VA sc near P T EOM CVF   20/30 5->3mm no apd STP Full Full   20/40 5->3mm no apd STP Full Full     SLE/PLE    With 20D lens at bedside     OD OS   External/Lid wnl wnl   Conj/sclera White/quiet White/quiet   Cornea clear clear   Anterior Chamber formed formed   Iris Round/reactive Round/reactive   Lens PCIOL PCIOL   Vitreous clear clear     Dilated Fundus Exam:  11:26 AM  OD - pink tilted optic nerve with PPA, C:D 0.2, PVD, macula, vessels, periphery normal   OS -  pink tilted optic nerve with PPA, C:D 0.2, PVD, macula, vessels, periphery normal     Imaging:  CT Abdomen Pelvis Without Contrast   Final Result   1. No abdominal wall hernias or other acute process identified.   2. Status post cholecystectomy.   3. Mild prostate gland enlargement.       Radiation dose reduction techniques were utilized, including automated   exposure control and exposure modulation based on body size.       This report was finalized on 10/3/2019 8:54 AM by Dr. Bunny Mcgregor M.D.          CT Angiogram Head With Contrast   Final Result   1. CT images through the head are without change when compared to MRI of   the brain earlier in the day 10/01/2019 at 6:00 AM. There is mild small   vessel disease in the cerebral white matter and an 8 x 6 mm  old lacunar   type infarct in the lateral left thalamus. The tiny 7 x 4 mm old   posterior superior right frontal cortical infarct is better seen on   prior MRI than on the current CT angiogram, it is in the distribution of   the posterior superior frontal branch of the right MCA territory. No   acute intracranial abnormality is seen with no acute infarct or   intracranial hemorrhage seen.       2. CT angiogram images through the neck demonstrate circumferential   atherosclerotic plaque resulting in very mild less than 20% stenosis   proximal left internal carotid artery using the NASCET criteria,   otherwise no stenosis is seen in the great vessels of the neck.       3. CT angiogram images through the head demonstrate mild narrowing of   the intracranial segment of the dominant distal left vertebral artery.   There is moderate-to-severe circumferential narrowing of the proximal   intracranial segment of the distal right vertebral artery, then it is   widely open to the right PICA origin. The immediate post PICA segment   has a moderate-to-severe stenosis but it is patent to the   vertebrobasilar junction. There is mild-to-moderate stenosis of the   proximal P3 segment of the right posterior cerebral artery. The findings   are on the basis of some intracranial atherosclerotic disease.       4. There has been previous cervical spine surgery, the anterior   discectomy and fusion procedure C5-C7. There are areas of residual   cervical canal and foraminal narrowing as described above. The remainder   of the CT angiogram of the head and neck is unremarkable.        Radiation dose reduction techniques were utilized, including automated   exposure control and exposure modulation based on body size.       This report was finalized on 10/2/2019 9:43 AM by Dr. Ankur Diane M.D.          CT Angiogram Carotids   Final Result   1. CT images through the head are without change when compared to MRI of   the brain earlier in the  day 10/01/2019 at 6:00 AM. There is mild small   vessel disease in the cerebral white matter and an 8 x 6 mm old lacunar   type infarct in the lateral left thalamus. The tiny 7 x 4 mm old   posterior superior right frontal cortical infarct is better seen on   prior MRI than on the current CT angiogram, it is in the distribution of   the posterior superior frontal branch of the right MCA territory. No   acute intracranial abnormality is seen with no acute infarct or   intracranial hemorrhage seen.       2. CT angiogram images through the neck demonstrate circumferential   atherosclerotic plaque resulting in very mild less than 20% stenosis   proximal left internal carotid artery using the NASCET criteria,   otherwise no stenosis is seen in the great vessels of the neck.       3. CT angiogram images through the head demonstrate mild narrowing of   the intracranial segment of the dominant distal left vertebral artery.   There is moderate-to-severe circumferential narrowing of the proximal   intracranial segment of the distal right vertebral artery, then it is   widely open to the right PICA origin. The immediate post PICA segment   has a moderate-to-severe stenosis but it is patent to the   vertebrobasilar junction. There is mild-to-moderate stenosis of the   proximal P3 segment of the right posterior cerebral artery. The findings   are on the basis of some intracranial atherosclerotic disease.       4. There has been previous cervical spine surgery, the anterior   discectomy and fusion procedure C5-C7. There are areas of residual   cervical canal and foraminal narrowing as described above. The remainder   of the CT angiogram of the head and neck is unremarkable.        Radiation dose reduction techniques were utilized, including automated   exposure control and exposure modulation based on body size.       This report was finalized on 10/2/2019 9:43 AM by Dr. Ankur Diane M.D.          MRI Brain With & Without  Contrast   Final Result   Small vessel ischemic disease with no evidence of acute   infarction, mass or of abnormal enhancement. A small lacunar infarct   involving the thalamus on the left is appreciated. There is a trace   amount of fluid present within the mastoid process on the right.               This report was finalized on 10/1/2019 3:05 PM by Dr. Carl Garcia M.D.              Data Review:  CBC:   Results from last 7 days   Lab Units 10/03/19  0701   WBC 10*3/mm3 15.12*   RBC 10*6/mm3 4.49     BMP:   Results from last 7 days   Lab Units 10/03/19  0701   GLUCOSE mg/dL 328*   CO2 mmol/L 30.4*   BUN mg/dL 17   CREATININE mg/dL 0.83   CALCIUM mg/dL 8.6     Coagulation:   Lab Results   Component Value Date    INR 1.02 09/30/2019       Assessment/Recommendations:  Ramiro Maradiaga is a 64 y.o.     Concern for Giant cell arteritis  - new onset headache with temporal artery tenderness, left side  - low suspicion for GCA given normal inflammatory markers and minimal symptoms however patient has already been started on prednisone which warrants temporal artery biopsy  - Biopsy will be unable to be performed until next week, tuesday at the earliest. Can be done as outpatient should patient be discharged  - discontinue Plavix if cleared per primary   - continue prednisone per neurology       Thank you for the consult.    Johnathon Noriega Jr, MD  10/3/2019 11:26 AM.   hypoactive

## 2019-10-03 NOTE — DISCHARGE SUMMARY
Patient Name: Ramiro Maradiaga  : 1955  MRN: [id number]    Date of Admission: 2019  Date of Discharge:  10/11/2019  Primary Care Physician: Heidi Jalloh APRN      Chief Complaint:   No chief complaint on file.      Discharge Diagnoses     Active Hospital Problems    Diagnosis  POA   • **Left temporal headache [R51]  Yes   • Migraine [G43.909]  Yes   • B12 deficiency [E53.8]  Yes   • Type 2 diabetes mellitus with hyperglycemia, without long-term current use of insulin (CMS/[facility]) [E11.65]  Yes   • CAD (coronary artery disease) [I25.10]  Yes   • Hypocalcemia [E83.51]  Yes   • TIA (transient ischemic attack) [G45.9]  Yes   • CHF (congestive heart failure) (CMS/[facility]) [I50.9]  Yes   • Stroke (CMS/[facility]) - history of [I63.9]  Yes   • History of carotid endarterectomy [Z98.890]  Not Applicable   • Ataxia [R27.0]  Yes      Resolved Hospital Problems   No resolved problems to display.        Hospital Course     Mr. Maradiaga is a 64 y.o. male non-smoker with a history of DM2, CAD, CHF, CVA, Carotid Artery Disease who presented to [facility] initially complaining of dizziness, left sided weakness, and difficulty speaking.  Please see the admitting history and physical for further details. He was admitted with concern for TIA/CVA. Please see below for details of complicated admission:    Old right lacunar infarct on repeat MRI, doesn't really correspond to pt's symptoms though and is not felt to be acute  CTA and Echo are okay  Continue statin (cholesterol high), ASA changed to Plavix given chronic changes in cervical circulation, was given dose this AM, but that has now been held as he needs procedure done on Tuesday 10/8 per Ophtho  PT/OT/ST evals all fine     Replaced B12 IM x 3 doses, defer further mgmt to pt's PCP (ie. Oral vs IM)     I don't think pt's calcium level is low enough to cause any neurologic symptoms and it is normal again today, iCa++ is also normal     I also don't think pt has  temporal arteritis: his TA pulse is excellent and TA is non-tender, his ESR is also normal  Started on high dose oral Prednisone per Neuro and Ophtho was consulted  Ophtho doubts this is TA as well, but recommends bilateral TA biopsies anyway, they can't do until Tuesday which works out perfectly as he got a dose of Plavix this AM, he'll need 5d off the Plavix prior to procedure so this should work out fine  Ophtho is fine with pt's dc today on Prednisone  Defer steroid taper to Ophtho based on results of biopsy     Asked Gen Surg to see regarding his large tender hernias--Dr. Zuniga does not in fact feel pt has any hernias, he checked a CT A&P and it was fine     Sugars and WBC are high due to steroids, HgbA1c is 9.5, restart Metformin at discharge    Home today with family and [facility]    Day of Discharge     Multiple somatic complaints today, no changes, nothing of acute concern    Physical Exam:     Body mass index is 27.47 kg/m².  Physical Exam  General Appearance:  Comfortable and in no acute distress.     Vital signs are normal.  No fever.    Output: Producing urine.    HEENT: Normal HEENT exam.    Lungs:  Normal effort and normal respiratory rate.  Breath sounds clear to auscultation.  He is not in respiratory distress.    Heart: Normal rate.  Regular rhythm.  No murmur.   Abdomen: Abdomen is soft. Bowel sounds are normal.     Extremities: There is no dependent edema.    Pulses: Distal pulses are intact.    Neurological: Patient is alert and oriented to person, place and time.  Normal strength.  Patient has normal muscle tone.  (Pt has subjective loss of sensation on left side, he claims he is weak on the left, but has no drift).    Pupils:  Pupils are equal, round, and reactive to light.  (No photophobia).    Skin:  Warm and dry.  No rash.     Consultants     Consult Orders (all) (From admission, onward)    Start     Ordered    10/01/19 1540  Inpatient General Surgery Consult  Once     Specialty:   General Surgery  Provider:  Ramiro Zuniga MD    10/01/19 1540    10/01/19 0702  Inpatient Ophthalmology Consult  IN AM     Specialty:  Ophthalmology  Provider:  Nguyen Cantu MD    09/30/19 1849    10/01/19 0000  Inpatient Neuro Clinical Specialist Consult  Once     Provider:  (Not yet assigned)    09/30/19 1709 09/30/19 1709  Inpatient Neurology Consult Stroke  Once     Specialty:  Neurology  Provider:  Ricardo Mead MD    09/30/19 1709 09/30/19 1707  Inpatient Rehab Admission Consult  Once     Provider:  (Not yet assigned)    09/30/19 1709 09/30/19 1707  Inpatient Case Management  Consult  Once     Provider:  (Not yet assigned)    09/30/19 1709 09/30/19 1707  Inpatient Diabetes Educator Consult  Once,   Status:  Canceled     Provider:  (Not yet assigned)    09/30/19 1709        Procedures     * Surgery not found *    Imaging Results (all)     Procedure Component Value Units Date/Time    CT Abdomen Pelvis Without Contrast [[id number]] Collected:  10/02/19 1530     Updated:  10/03/19 0857    Narrative:       CT OF THE ABDOMEN AND PELVIS WITHOUT CONTRAST 10/02/2019     HISTORY: Evaluate abdominal wall for possible hernia.     TECHNIQUE: Spiral images were obtained from the lung bases to the  symphysis pubis. No intravenous or oral contrast was given.     FINDINGS:  There is contrast material in the urinary bladder from  yesterday's CT angiogram.     There is some mild thinning of the rectus musculature. No abdominal wall  hernias are seen.     There is particulate debris in the stomach. Gallbladder has been  removed. The liver, spleen, pancreas and adrenals appear unremarkable. A  subtle low-density lesion is seen in the right kidney on image 62  possibly a tiny cyst on this unenhanced scan.     Prostate gland is mildly enlarged.       Impression:       1. No abdominal wall hernias or other acute process identified.  2. Status post cholecystectomy.  3. Mild prostate  gland enlargement.     Radiation dose reduction techniques were utilized, including automated  exposure control and exposure modulation based on body size.     This report was finalized on 10/3/2019 8:54 AM by Dr. Bunny Mcgregor M.D.       CT Angiogram Head With Contrast [[id number]] Collected:  10/02/19 0809     Updated:  10/02/19 0946    Narrative:       CONTRAST-ENHANCED CT ANGIOGRAM OF THE HEAD AND NECK 10/01/2019     CLINICAL HISTORY: Stroke. The patient has ataxia and slurred speech.     TECHNIQUE: Spiral CT images were obtained from the base of the skull to  the vertex both pre and post intravenous contrast. Images were  reformatted and submitted in 3 mm thick axial CT sections with brain  algorithm. Additional spiral CT images were obtained from the top of the  aortic arch up through the great vessels of the head and neck during  arterial phase of contrast. Images were reformatted and submitted in 1  mm thick axial, sagittal and coronal CT sections. Additional 3D  reconstructions were performed to complete the CT angiogram of the head  and neck.     This is correlated to an MRI of the brain from [facility]  10/01/2019.     FINDINGS:     HEAD CT: There is some mild low-density and periventricular white matter  consistent with mild small vessel disease. There is an 8 x 6 mm old  lacunar infarct in the lateral left thalamus. There is a tiny 7 x 4 mm  old posterior superior right frontal cortical infarct in the right MCA  territory that is better seen on prior MRIs. The ventricles are normal  in size. I see no mass effect and no midline shift and no extra-axial  fluid collections are identified. There is no evidence of acute  intracranial hemorrhage. No abnormal areas of enhancement are seen in  the head. There has been previous paranasal sinus surgery with a left  uncinectomy and there is minimal left ethmoidectomy and there is mucosal  thickening in the inferior medial left frontal sinus,  left frontal  recess, left ethmoid cavity and circumferentially in the somewhat  hypoplastic left maxillary sinus. The remainder of the paranasal sinuses  and mastoid air cells and middle ear cavities are clear.     CT ANGIOGRAM OF THE NECK: The nasopharynx, oropharynx, hypopharynx, true  cords and subglottic airway are normal in appearance. The thyroid gland  enhances homogeneously and is normal in appearance. The lung apices are  clear. The parotid, , parapharyngeal and submandibular spaces  are symmetric and are normal in appearance. The patient has had a  previous anterior cervical discectomy and fusion procedure C5-C7 with  anterior plate and screw fixation, disc implants in the C5-6 and C6-7  disc space, some residual cervical spondylosis with moderate left facet  arthropathy mildly narrowing the left foramen at C2-3, moderately  narrowing the left foramen at C3-4. There is some posterior bony  overgrowth resulting in mild narrowing of the central to right side of  the canal at C5-6 and there is some posterior bony spurring  mild-to-moderately narrowing the left side of the canal at C6-7 and  there is mild-to-moderate narrowing of the left foramen at C6-7. There  is common origin of the left common carotid artery and brachiocephalic  artery off the aortic arch constituting a bovine configuration of the  aortic arch which is a normal anatomic variation. The left subclavian  artery origin is normal in appearance, no stenosis is seen in left  subclavian artery. The left vertebral artery origin is normal in  appearance and no stenosis is seen in the left vertebral artery from its  origin to the vertebrobasilar junction. The left common carotid origin  is normal in appearance and no stenosis is seen. The left common carotid  artery has mixed calcified and noncalcified plaque circumscribing the  origin and proximal aspect of the left internal carotid artery. There is  maximally a 20% stenosis of the  proximal left internal carotid artery  using the NASCET criteria. Brachiocephalic artery origins are normal in  appearance. No stenosis is seen in the brachiocephalic artery, its  bifurcation into the right subclavian and common carotid artery is  normal in appearance. No stenosis is seen in the right subclavian  artery. The right vertebral artery origin is normal in appearance. No  stenosis is seen in the right vertebral artery from its origin to its  intracranial segment where there is stenosis of the intracranial segment  of the right vertebral artery. The right common carotid origin is normal  in appearance. No stenosis is seen in the right common carotid artery.  Its bifurcation into the right internal and external carotid arteries is  within normal limits, no stenosis is seen in the right internal carotid  artery using the NASCET criteria.      CT ANGIOGRAM OF THE HEAD: CT angiogram images of the head demonstrate  mild narrowing of the intracranial segment of the dominant distal left  vertebral artery. There is moderate-to-severe circumferential stenosis  of the proximal intracranial segment of the right vertebral artery and  then it is widely patent to the PICA origin and then the immediate post  PICA segment is moderately stenotic and then it is widely patent to the  vertebrobasilar junction. The basilar artery and basilar tip is normal  in appearance. There is mild-to-moderate stenosis of the proximal P3  segment of the right posterior cerebral artery. Otherwise posterior  cerebral and superior cerebellar arteries are within normal limits. The  upper cervical, petrous, cavernous and supracavernous segment of the  left internal carotid artery is normal in appearance. The upper cervical  petrous segment of the right internal carotid is normal in appearance.  There is moderate stenosis of the distal cavernous segment of the right  internal carotid artery. The supracavernous segment is widely patent.  There is  an atretic A1 segment to the right anterior cerebral artery and  the dominant left A1 segment supplies both A2 segments via a normal  appearing anterior communicating artery. The visualized A2 and A3  segments of the anterior cerebral arteries are widely patent without  stenosis. The M1 segment of the middle cerebral arteries and middle  cerebral artery trifurcations are within normal limits.       Impression:       1. CT images through the head are without change when compared to MRI of  the brain earlier in the day 10/01/2019 at 6:00 AM. There is mild small  vessel disease in the cerebral white matter and an 8 x 6 mm old lacunar  type infarct in the lateral left thalamus. The tiny 7 x 4 mm old  posterior superior right frontal cortical infarct is better seen on  prior MRI than on the current CT angiogram, it is in the distribution of  the posterior superior frontal branch of the right MCA territory. No  acute intracranial abnormality is seen with no acute infarct or  intracranial hemorrhage seen.     2. CT angiogram images through the neck demonstrate circumferential  atherosclerotic plaque resulting in very mild less than 20% stenosis  proximal left internal carotid artery using the NASCET criteria,  otherwise no stenosis is seen in the great vessels of the neck.     3. CT angiogram images through the head demonstrate mild narrowing of  the intracranial segment of the dominant distal left vertebral artery.  There is moderate-to-severe circumferential narrowing of the proximal  intracranial segment of the distal right vertebral artery, then it is  widely open to the right PICA origin. The immediate post PICA segment  has a moderate-to-severe stenosis but it is patent to the  vertebrobasilar junction. There is mild-to-moderate stenosis of the  proximal P3 segment of the right posterior cerebral artery. The findings  are on the basis of some intracranial atherosclerotic disease.     4. There has been previous  cervical spine surgery, the anterior  discectomy and fusion procedure C5-C7. There are areas of residual  cervical canal and foraminal narrowing as described above. The remainder  of the CT angiogram of the head and neck is unremarkable.      Radiation dose reduction techniques were utilized, including automated  exposure control and exposure modulation based on body size.     This report was finalized on 10/2/2019 9:43 AM by Dr. Ankur Diane M.D.       CT Angiogram Carotids [[id number]] Collected:  10/02/19 0809     Updated:  10/02/19 0946    Narrative:       CONTRAST-ENHANCED CT ANGIOGRAM OF THE HEAD AND NECK 10/01/2019     CLINICAL HISTORY: Stroke. The patient has ataxia and slurred speech.     TECHNIQUE: Spiral CT images were obtained from the base of the skull to  the vertex both pre and post intravenous contrast. Images were  reformatted and submitted in 3 mm thick axial CT sections with brain  algorithm. Additional spiral CT images were obtained from the top of the  aortic arch up through the great vessels of the head and neck during  arterial phase of contrast. Images were reformatted and submitted in 1  mm thick axial, sagittal and coronal CT sections. Additional 3D  reconstructions were performed to complete the CT angiogram of the head  and neck.     This is correlated to an MRI of the brain from [facility]  10/01/2019.     FINDINGS:     HEAD CT: There is some mild low-density and periventricular white matter  consistent with mild small vessel disease. There is an 8 x 6 mm old  lacunar infarct in the lateral left thalamus. There is a tiny 7 x 4 mm  old posterior superior right frontal cortical infarct in the right MCA  territory that is better seen on prior MRIs. The ventricles are normal  in size. I see no mass effect and no midline shift and no extra-axial  fluid collections are identified. There is no evidence of acute  intracranial hemorrhage. No abnormal areas of enhancement are seen  in  the head. There has been previous paranasal sinus surgery with a left  uncinectomy and there is minimal left ethmoidectomy and there is mucosal  thickening in the inferior medial left frontal sinus, left frontal  recess, left ethmoid cavity and circumferentially in the somewhat  hypoplastic left maxillary sinus. The remainder of the paranasal sinuses  and mastoid air cells and middle ear cavities are clear.     CT ANGIOGRAM OF THE NECK: The nasopharynx, oropharynx, hypopharynx, true  cords and subglottic airway are normal in appearance. The thyroid gland  enhances homogeneously and is normal in appearance. The lung apices are  clear. The parotid, , parapharyngeal and submandibular spaces  are symmetric and are normal in appearance. The patient has had a  previous anterior cervical discectomy and fusion procedure C5-C7 with  anterior plate and screw fixation, disc implants in the C5-6 and C6-7  disc space, some residual cervical spondylosis with moderate left facet  arthropathy mildly narrowing the left foramen at C2-3, moderately  narrowing the left foramen at C3-4. There is some posterior bony  overgrowth resulting in mild narrowing of the central to right side of  the canal at C5-6 and there is some posterior bony spurring  mild-to-moderately narrowing the left side of the canal at C6-7 and  there is mild-to-moderate narrowing of the left foramen at C6-7. There  is common origin of the left common carotid artery and brachiocephalic  artery off the aortic arch constituting a bovine configuration of the  aortic arch which is a normal anatomic variation. The left subclavian  artery origin is normal in appearance, no stenosis is seen in left  subclavian artery. The left vertebral artery origin is normal in  appearance and no stenosis is seen in the left vertebral artery from its  origin to the vertebrobasilar junction. The left common carotid origin  is normal in appearance and no stenosis is seen. The  left common carotid  artery has mixed calcified and noncalcified plaque circumscribing the  origin and proximal aspect of the left internal carotid artery. There is  maximally a 20% stenosis of the proximal left internal carotid artery  using the NASCET criteria. Brachiocephalic artery origins are normal in  appearance. No stenosis is seen in the brachiocephalic artery, its  bifurcation into the right subclavian and common carotid artery is  normal in appearance. No stenosis is seen in the right subclavian  artery. The right vertebral artery origin is normal in appearance. No  stenosis is seen in the right vertebral artery from its origin to its  intracranial segment where there is stenosis of the intracranial segment  of the right vertebral artery. The right common carotid origin is normal  in appearance. No stenosis is seen in the right common carotid artery.  Its bifurcation into the right internal and external carotid arteries is  within normal limits, no stenosis is seen in the right internal carotid  artery using the NASCET criteria.      CT ANGIOGRAM OF THE HEAD: CT angiogram images of the head demonstrate  mild narrowing of the intracranial segment of the dominant distal left  vertebral artery. There is moderate-to-severe circumferential stenosis  of the proximal intracranial segment of the right vertebral artery and  then it is widely patent to the PICA origin and then the immediate post  PICA segment is moderately stenotic and then it is widely patent to the  vertebrobasilar junction. The basilar artery and basilar tip is normal  in appearance. There is mild-to-moderate stenosis of the proximal P3  segment of the right posterior cerebral artery. Otherwise posterior  cerebral and superior cerebellar arteries are within normal limits. The  upper cervical, petrous, cavernous and supracavernous segment of the  left internal carotid artery is normal in appearance. The upper cervical  petrous segment of the  right internal carotid is normal in appearance.  There is moderate stenosis of the distal cavernous segment of the right  internal carotid artery. The supracavernous segment is widely patent.  There is an atretic A1 segment to the right anterior cerebral artery and  the dominant left A1 segment supplies both A2 segments via a normal  appearing anterior communicating artery. The visualized A2 and A3  segments of the anterior cerebral arteries are widely patent without  stenosis. The M1 segment of the middle cerebral arteries and middle  cerebral artery trifurcations are within normal limits.       Impression:       1. CT images through the head are without change when compared to MRI of  the brain earlier in the day 10/01/2019 at 6:00 AM. There is mild small  vessel disease in the cerebral white matter and an 8 x 6 mm old lacunar  type infarct in the lateral left thalamus. The tiny 7 x 4 mm old  posterior superior right frontal cortical infarct is better seen on  prior MRI than on the current CT angiogram, it is in the distribution of  the posterior superior frontal branch of the right MCA territory. No  acute intracranial abnormality is seen with no acute infarct or  intracranial hemorrhage seen.     2. CT angiogram images through the neck demonstrate circumferential  atherosclerotic plaque resulting in very mild less than 20% stenosis  proximal left internal carotid artery using the NASCET criteria,  otherwise no stenosis is seen in the great vessels of the neck.     3. CT angiogram images through the head demonstrate mild narrowing of  the intracranial segment of the dominant distal left vertebral artery.  There is moderate-to-severe circumferential narrowing of the proximal  intracranial segment of the distal right vertebral artery, then it is  widely open to the right PICA origin. The immediate post PICA segment  has a moderate-to-severe stenosis but it is patent to the  vertebrobasilar junction. There is  mild-to-moderate stenosis of the  proximal P3 segment of the right posterior cerebral artery. The findings  are on the basis of some intracranial atherosclerotic disease.     4. There has been previous cervical spine surgery, the anterior  discectomy and fusion procedure C5-C7. There are areas of residual  cervical canal and foraminal narrowing as described above. The remainder  of the CT angiogram of the head and neck is unremarkable.      Radiation dose reduction techniques were utilized, including automated  exposure control and exposure modulation based on body size.     This report was finalized on 10/2/2019 9:43 AM by Dr. Ankur Diane M.D.       MRI Brain With & Without Contrast [[id number]] Collected:  10/01/19 0857     Updated:  10/01/19 1508    Narrative:       MRI BRAIN WITH AND WITHOUT CONTRAST     HISTORY: Stroke, scalp pain, ataxia, slurred speech. TIA.     COMPARISON: MRI brain 09/30/2019.     TECHNIQUE: A MRI examination of the brain was performed before and after  the intravenous administration of contrast utilizing sagittal T1, axial  diffusion, T1, T2, T2 FLAIR, gradient echo T2 as well as axial and  coronal T1 postcontrast weighted sequences.     There is no evidence of restricted diffusion to suggest acute  infarction. There is expected flow-void in the basilar artery and in the  distal aspect of internal carotid arteries bilaterally on the axial T2  sequence. Increased signal intensity is present involving the white  matter of the cerebral hemispheres bilaterally on the T2 FLAIR sequence  nonspecific. There is expected flow-void in the basilar artery and in  the distal aspect of the internal carotid arteries bilaterally on the  axial T2 sequence. There is a small amount of fluid present within the  mastoid air cells on the right.     A lacunar infarct involving the thalamus on the left is appreciated  measuring 9 mm in size.     After contrast administration there was no evidence of  abnormal  enhancement.       Impression:       Small vessel ischemic disease with no evidence of acute  infarction, mass or of abnormal enhancement. A small lacunar infarct  involving the thalamus on the left is appreciated. There is a trace  amount of fluid present within the mastoid process on the right.           This report was finalized on 10/1/2019 3:05 PM by Dr. Carl Garcia M.D.                  Results for orders placed during the hospital encounter of 09/30/19   Adult Transthoracic Echo Complete W/ Cont if Necessary Per Protocol (With Agitated Saline)    Narrative · Calculated EF = 56.0%. Estimated EF was in agreement with the calculated   EF. Normal left ventricular cavity size and wall thickness noted. All left   ventricular wall segments contract normally. Left ventricular diastolic   dysfunction is noted (grade I) consistent with impaired relaxation. There   is no evidence of a left ventricular mass or thrombus present.  · No evidence of a patent foramen ovale. No evidence of an atrial septal   defect present. . Saline test results are negative.        Pertinent Labs           Estimated Creatinine Clearance: 107.9 mL/min (by C-G formula based on SCr of 0.83 mg/dL).                      Invalid input(s): LDLCALC        Test Results Pending at Discharge   None    Discharge Details        Discharge Medications      New Medications      Instructions Start Date   atorvastatin 80 MG tablet  Commonly known as:  LIPITOR   80 mg, Oral, Nightly      clopidogrel 75 MG tablet  Commonly known as:  PLAVIX   75 mg, Oral, Daily      famotidine 20 MG tablet  Commonly known as:  PEPCID   20 mg, Oral, 2 Times Daily Before Meals      predniSONE 20 MG tablet  Commonly known as:  DELTASONE   60 mg, Oral, Daily         Continue These Medications      Instructions Start Date   DULoxetine 60 MG capsule  Commonly known as:  CYMBALTA   60 mg, Oral, Nightly      gabapentin 400 MG capsule  Commonly known as:  NEURONTIN   400  mg, Oral, 3 Times Daily      metFORMIN 500 MG tablet  Commonly known as:  GLUCOPHAGE   500 mg, Oral, 2 Times Daily With Meals      oxyCODONE-acetaminophen 7.5-325 MG per tablet  Commonly known as:  PERCOCET   1 tablet, Oral, 2 Times Daily             Allergies   Allergen Reactions   • Elavil [Amitriptyline Hcl] Rash         Discharge Disposition:  Home-Health Care Svc    Discharge Diet:  No active diet order      Discharge Activity:   as tolerated    CODE STATUS:    Code Status and Medical Interventions:   Ordered at: 09/30/19 1700     Code Status:    CPR     Medical Interventions (Level of Support Prior to Arrest):    Full       No future appointments.  Additional Instructions for the Follow-ups that You Need to Schedule     Ambulatory Referral to Home Health   As directed      Face to Face Visit Date:  10/3/2019    Follow-up provider for Plan of Care?:  I treated the patient in an acute care facility and will not continue treatment after discharge.    Follow-up provider:  TEDDY MARIA [[id number]]    Reason/Clinical Findings:  Headache, TIA, possible temporal arteritis, DM2, steroid therapy    Describe mobility limitations that make leaving home difficult:  requires the assistance of another to leave the home    Nursing/Therapeutic Services Requested:  Skilled Nursing    Skilled nursing orders:  Medication education    Frequency:  1 Week 1         Discharge Follow-up with PCP   As directed       Currently Documented PCP:    Teddy Maria, LEONEL    PCP Phone Number:    [phone]     Follow Up Details:  Shubham NP (PCP) in 1 week         Discharge Follow-up with Specified Provider: Solomon Bustamante; 3 Weeks   As directed      To:  Solomon Riddles    Follow Up:  3 Weeks    Follow Up Details:  re: headache         Discharge Follow-up with Specified Provider: Dr. Noriega ([facility])   As directed      To:  Dr. Noriega ([facility])    Follow Up Details:  Needs biopsy of temporal arteries as outpt on  Tuesday per his recs in chart           Follow-up Information     Teddy Jalloh APRN .    Specialty:  Nurse Practitioner  Why:  Shubham NP (PCP) in 1 week  Contact information:  [address]  [place] [address]  [phone]                   Additional Instructions for the Follow-ups that You Need to Schedule     Ambulatory Referral to Home Health   As directed      Face to Face Visit Date:  10/3/2019    Follow-up provider for Plan of Care?:  I treated the patient in an acute care facility and will not continue treatment after discharge.    Follow-up provider:  TEDDY JALLOH [[id number]]    Reason/Clinical Findings:  Headache, TIA, possible temporal arteritis, DM2, steroid therapy    Describe mobility limitations that make leaving home difficult:  requires the assistance of another to leave the home    Nursing/Therapeutic Services Requested:  Skilled Nursing    Skilled nursing orders:  Medication education    Frequency:  1 Week 1         Discharge Follow-up with PCP   As directed       Currently Documented PCP:    Teddy Jalloh APRN    PCP Phone Number:    [phone]     Follow Up Details:  Shubham NP (PCP) in 1 week         Discharge Follow-up with Specified Provider: Solomon Riddles; 3 Weeks   As directed      To:  [facility]    Follow Up:  3 Weeks    Follow Up Details:  re: headache         Discharge Follow-up with Specified Provider: Dr. Noriega ([facility])   As directed      To:  Dr. Noriega ([facility])    Follow Up Details:  Needs biopsy of temporal arteries as outpt on Tuesday per his recs in chart           Time Spent on Discharge:  Greater than 30 minutes      Mode Messina MD  [facility]  10/11/19  1:37 PM

## 2019-10-03 NOTE — PROGRESS NOTES
DOS: 10/3/2019  NAME: Ramiro Maradiaga   : 1955  PCP: Heidi Jalloh APRN    No chief complaint on file.  CC: blurred vision,left jaw pain, headache    Stroke    Subjective: No acute events overnight.  Remains with left-sided headache, intermittent.  He and his daughter state that the ophthalmologist came in this morning and told them that they plan on doing a biopsy of his temporal arteries, and stated that they would try to get to him before the weekend but they may not be able to do this until Monday.  He denies any new weakness, numbness, speech or visual disturbances, or headaches.  He is feeling better and doing better with physical therapy.  Daughter at bedside.    Objective:  Vital signs:      Vitals:    10/03/19 0400 10/03/19 0405 10/03/19 0500 10/03/19 0714   BP:    141/87   BP Location:    Right arm   Patient Position:    Lying   Pulse:    77   Resp:    16   Temp:    98.1 °F (36.7 °C)   TempSrc:    Oral   SpO2: (!) 78% 96%  95%   Weight:   86 kg (189 lb 11.2 oz)    Height:           Current Facility-Administered Medications:   •  acetaminophen (TYLENOL) tablet 650 mg, 650 mg, Oral, Q4H PRN, 650 mg at 10/01/19 1535 **OR** acetaminophen (TYLENOL) suppository 650 mg, 650 mg, Rectal, Q4H PRN, Chang Hawkins MD  •  atorvastatin (LIPITOR) tablet 80 mg, 80 mg, Oral, Nightly, Chang Hawkins MD, 80 mg at 10/02/19 2026  •  clopidogrel (PLAVIX) tablet 75 mg, 75 mg, Oral, Daily, Jennifer Damon APRN, 75 mg at 10/03/19 0904  •  dextrose (D50W) 25 g/ 50mL Intravenous Solution 25 g, 25 g, Intravenous, Q15 Min PRN, Jeffery Alcaraz MD  •  dextrose (GLUTOSE) oral gel 15 g, 15 g, Oral, Q15 Min PRN, Jeffery Alcaraz MD  •  DULoxetine (CYMBALTA) DR capsule 60 mg, 60 mg, Oral, Nightly, Jeffery Alcaraz MD, 60 mg at 10/02/19 2026  •  famotidine (PEPCID) tablet 20 mg, 20 mg, Oral, BID Alyce ACOSTA James Benjamin, MD, 20 mg at 10/03/19 0645  •  gabapentin (NEURONTIN) capsule 400 mg, 400 mg, Oral, TID, Kieran, Jawed,  MD, 400 mg at 10/03/19 0904  •  glucagon (human recombinant) (GLUCAGEN DIAGNOSTIC) injection 1 mg, 1 mg, Subcutaneous, Q15 Min PRN, Jeffery Alcaraz MD  •  insulin lispro (humaLOG) injection 0-14 Units, 0-14 Units, Subcutaneous, 4x Daily With Meals & Nightly, Mode Messina MD, 10 Units at 10/03/19 0904  •  ondansetron (ZOFRAN) injection 4 mg, 4 mg, Intravenous, Q6H PRN, Chang Hawkins MD  •  oxyCODONE-acetaminophen (PERCOCET) 7.5-325 MG per tablet 1 tablet, 1 tablet, Oral, BID, Jeffery Alcaraz MD, 1 tablet at 10/03/19 0904  •  predniSONE (DELTASONE) tablet 60 mg, 60 mg, Oral, Daily, Jennifer Damon APRN  •  sodium chloride 0.9 % flush 10 mL, 10 mL, Intravenous, Q12H, Chang Hawkins MD, 10 mL at 10/03/19 0904  •  sodium chloride 0.9 % flush 10 mL, 10 mL, Intravenous, PRN, Chang Hawkins MD    PRN meds  •  acetaminophen **OR** acetaminophen  •  dextrose  •  dextrose  •  glucagon (human recombinant)  •  ondansetron  •  sodium chloride    No current facility-administered medications on file prior to encounter.      Current Outpatient Medications on File Prior to Encounter   Medication Sig   • DULoxetine (CYMBALTA) 60 MG capsule Take 60 mg by mouth Every Night.   • gabapentin (NEURONTIN) 400 MG capsule Take 400 mg by mouth 3 (Three) Times a Day.   • metFORMIN (GLUCOPHAGE) 500 MG tablet Take 500 mg by mouth 2 (Two) Times a Day With Meals.   • oxyCODONE-acetaminophen (PERCOCET) 7.5-325 MG per tablet Take 1 tablet by mouth 2 (Two) Times a Day.     General appearance: NAD, alert and cooperative  HEENT: Normocephalic, atraumatic, PERRL  COR: RRR  Resp: Even and unlabored  Extremities: no edema  Skin: warm, dry     Neurological:   MS: oriented x3, recent/remote memory intact, normal attention/concentration, language intact, no neglect, normal fund of knowledge  CN: visual acuity grossly normal, visual fields full, PERRL, EOMI, decreased left facial sensation to light touch, no facial droop, hearing symmetric,  palate elevates symmetrically, shoulder shrug equal, tongue midline  Motor: 5/5 in all 4 ext., normal tone  Reflexes: 1+ in all ext. Except LUE areflexic  Sensory: light touch sensation decreased on LUE/LLE  Coordination: Normal finger to nose test  Gait and station: antalgic gait  Rapid alternating movements: normal finger to thumb tap    Physical exam performed, no changes noted.    Laboratory results:  Lab Results   Component Value Date    TSH 0.662 10/01/2019     Lab Results   Component Value Date    HGBA1C 9.50 (H) 10/01/2019     Lab Results   Component Value Date    SOUBOEII43 314 10/01/2019     Lab Results   Component Value Date    CHOL 226 (H) 10/01/2019     Lab Results   Component Value Date    TRIG 144 10/01/2019     Lab Results   Component Value Date    HDL 41 10/01/2019     Lab Results   Component Value Date     (H) 10/01/2019     Lab Results   Component Value Date    WBC 15.12 (H) 10/03/2019    HGB 13.9 10/03/2019    HCT 41.8 10/03/2019    MCV 93.1 10/03/2019     10/03/2019     Lab Results   Component Value Date    GLUCOSE 328 (H) 10/03/2019    BUN 17 10/03/2019    CREATININE 0.83 10/03/2019    EGFRIFNONA 93 10/03/2019    BCR 20.5 10/03/2019    K 4.4 10/03/2019    CO2 30.4 (H) 10/03/2019    CALCIUM 8.6 10/03/2019    ALBUMIN 4.00 10/02/2019    AST 9 10/02/2019    ALT 8 10/02/2019     No results found for: PTT  Lab Results   Component Value Date    INR 1.02 09/30/2019    PROTIME 13.1 09/30/2019     Brief Urine Lab Results     None          Review and interpretation of imaging:  Ct Abdomen Pelvis Without Contrast    Result Date: 10/3/2019  1. No abdominal wall hernias or other acute process identified. 2. Status post cholecystectomy. 3. Mild prostate gland enlargement.  Radiation dose reduction techniques were utilized, including automated exposure control and exposure modulation based on body size.  This report was finalized on 10/3/2019 8:54 AM by Dr. Bunny Mcgregor M.D.      Mri Brain  With & Without Contrast    Result Date: 10/1/2019  Small vessel ischemic disease with no evidence of acute infarction, mass or of abnormal enhancement. A small lacunar infarct involving the thalamus on the left is appreciated. There is a trace amount of fluid present within the mastoid process on the right.    This report was finalized on 10/1/2019 3:05 PM by Dr. Carl Garcia M.D.      Ct Angiogram Carotids    Result Date: 10/2/2019  1. CT images through the head are without change when compared to MRI of the brain earlier in the day 10/01/2019 at 6:00 AM. There is mild small vessel disease in the cerebral white matter and an 8 x 6 mm old lacunar type infarct in the lateral left thalamus. The tiny 7 x 4 mm old posterior superior right frontal cortical infarct is better seen on prior MRI than on the current CT angiogram, it is in the distribution of the posterior superior frontal branch of the right MCA territory. No acute intracranial abnormality is seen with no acute infarct or intracranial hemorrhage seen.  2. CT angiogram images through the neck demonstrate circumferential atherosclerotic plaque resulting in very mild less than 20% stenosis proximal left internal carotid artery using the NASCET criteria, otherwise no stenosis is seen in the great vessels of the neck.  3. CT angiogram images through the head demonstrate mild narrowing of the intracranial segment of the dominant distal left vertebral artery. There is moderate-to-severe circumferential narrowing of the proximal intracranial segment of the distal right vertebral artery, then it is widely open to the right PICA origin. The immediate post PICA segment has a moderate-to-severe stenosis but it is patent to the vertebrobasilar junction. There is mild-to-moderate stenosis of the proximal P3 segment of the right posterior cerebral artery. The findings are on the basis of some intracranial atherosclerotic disease.  4. There has been previous cervical spine  surgery, the anterior discectomy and fusion procedure C5-C7. There are areas of residual cervical canal and foraminal narrowing as described above. The remainder of the CT angiogram of the head and neck is unremarkable.   Radiation dose reduction techniques were utilized, including automated exposure control and exposure modulation based on body size.  This report was finalized on 10/2/2019 9:43 AM by Dr. Ankur Diane M.D.      Ct Angiogram Head With Contrast    Result Date: 10/2/2019  1. CT images through the head are without change when compared to MRI of the brain earlier in the day 10/01/2019 at 6:00 AM. There is mild small vessel disease in the cerebral white matter and an 8 x 6 mm old lacunar type infarct in the lateral left thalamus. The tiny 7 x 4 mm old posterior superior right frontal cortical infarct is better seen on prior MRI than on the current CT angiogram, it is in the distribution of the posterior superior frontal branch of the right MCA territory. No acute intracranial abnormality is seen with no acute infarct or intracranial hemorrhage seen.  2. CT angiogram images through the neck demonstrate circumferential atherosclerotic plaque resulting in very mild less than 20% stenosis proximal left internal carotid artery using the NASCET criteria, otherwise no stenosis is seen in the great vessels of the neck.  3. CT angiogram images through the head demonstrate mild narrowing of the intracranial segment of the dominant distal left vertebral artery. There is moderate-to-severe circumferential narrowing of the proximal intracranial segment of the distal right vertebral artery, then it is widely open to the right PICA origin. The immediate post PICA segment has a moderate-to-severe stenosis but it is patent to the vertebrobasilar junction. There is mild-to-moderate stenosis of the proximal P3 segment of the right posterior cerebral artery. The findings are on the basis of some intracranial atherosclerotic  disease.  4. There has been previous cervical spine surgery, the anterior discectomy and fusion procedure C5-C7. There are areas of residual cervical canal and foraminal narrowing as described above. The remainder of the CT angiogram of the head and neck is unremarkable.   Radiation dose reduction techniques were utilized, including automated exposure control and exposure modulation based on body size.  This report was finalized on 10/2/2019 9:43 AM by Dr. Ankur Diane M.D.      Results for orders placed during the hospital encounter of 09/30/19   Adult Transthoracic Echo Complete W/ Cont if Necessary Per Protocol (With Agitated Saline)    Narrative · Calculated EF = 56.0%. Estimated EF was in agreement with the calculated   EF. Normal left ventricular cavity size and wall thickness noted. All left   ventricular wall segments contract normally. Left ventricular diastolic   dysfunction is noted (grade I) consistent with impaired relaxation. There   is no evidence of a left ventricular mass or thrombus present.  · No evidence of a patent foramen ovale. No evidence of an atrial septal   defect present. . Saline test results are negative.          Impression/Assessment:  This is a 64-year-old male with a past medical history of CHF, CAD, diabetes, lumbar spine fracture, stroke with prior left CEA not on any anticoagulation or antiplatelets prior to admission, migraine who presented to the hospital after being transferred from [facility] with complaints of ataxia, vertigo, left jaw pain, intermittent blurred vision in the left eye, scalp tenderness on the left.  MRI at the outside hospital was negative, reviewed by Dr. Mead. Blood pressure on arrival 122/95 with a heart rate of 76.  EKG revealed a predominantly normal sinus rhythm. 2D echo revealed a normal LA size, no evidence PFO, saline test negative, EF 56%, normal LV function, no aortic valve stenosis present.  B12 is low normal, primary has initiated  replacement. Blood sugar 213 on arrival.  ESR 12, CRP 0.12.  MRI brain with no acute infarct noted, old lacunar infarct with some moderate small vessel disease. CTA H/N no acute infarct, right vertebral stenosis present. ASA was d/c'd and Plavix started on 10/2.     1.  Left-sided numbness  2.  Intermittent left temporal pain  2.  Ataxia, resolved  3.  Chronic migraine   4.  Left eye visual disturbance  5.  History of stroke   6.  History of Left carotid endarterectomy  7.  History of cervical and lumbar fractures    Patient and daughter report that ophthalmology came and saw the patient today and stated that they would plan to do biopsies of both temporal arteries. There is no progress note from them documented yet today. They would like to perform prior to the weekend, however if they were unable to do so they will plan to perform on Monday 10/7.  They also recommended that the patient continue his steroids.  I will restart prednisone 60 mg daily, further taper per ophthalmology.  I did discontinue his ASA yesterday after he reported prior history of bleeding, and started Plavix 75 mg as he has tolerated in the past and reportedly was taking up until a few months ago due to running out of his prescription and not refilling.  Recommend he continue until ophthalmology would like for him to discontinue prior to procedure, he did already receive a dose today.  Continue high-dose statin.  We will sign off, will see again per request.Therapies as written. CCP for discharge planning. Call RRT for any acute neurological changes.     Plan:  Restart prednisone 60mg daily, further taper per optho  Plavix 75mg, he did receive a dose today, will defer to optho when they would like Plavix held   Lipitor 80mg  PT/OT  Will sign off, will see again per request.    Case discussed with patient, daughter, and Dr. Mead, and he agrees with plan above.  LEONEL Noriega    Addenda: I spoke with Dr. Johnathon Noriega, he plans to ask  one of his colleagues if they can perform biopsies tomorrow. If unable, they will have to peform next Monday or Tuesday. They have no issues with patient going home from their standpoint. Patient is requesting to stay due to a transportation issue. He is still having left temporal tenderness so he is okay with patient staying on Prednisone for a total of 14 days, today would be day 4. He is okay to be discharged from our standpoint.     LEONEL Noriega

## 2019-10-03 NOTE — DISCHARGE PLACEMENT REQUEST
"Rosey Maradiaga (64 y.o. Male)     Date of Birth Social Security Number Address Home Phone MRN    1955  [id number] [phone] [id number]    Worship Marital Status          Mandaeism        Admission Date Admission Type Admitting Provider Attending Provider Department, Room/Bed    19 Urgent Chang Hawkins MD Benton, John B, MD [address], S521/1    Discharge Date Discharge Disposition Discharge Destination         [place] [facility] [place]ending Provider:  Mode Messina MD    Allergies:  Elavil [Amitriptyline Hcl]    Isolation:  None   Infection:  None   Code Status:  CPR    Ht:  177 cm (69.69\")   Wt:  86 kg (189 lb 11.2 oz)    Admission Cmt:  None   Principal Problem:  Left temporal headache [R51]                 Active Insurance as of 2019     Primary Coverage     Payor Plan Insurance Group Employer/Plan Group    MEDICARE MEDICARE A & B      Payor Plan Address Payor Plan Phone Number Payor Plan Fax Number Effective Dates    [address] [phone]  1995 - None Entered    [facility] [address]       Subscriber Name Subscriber Birth Date Member ID       ROSEY MARADIAGA 1955 [id number]           Secondary Coverage     Payor Plan Insurance Group Employer/Plan Group    [place]      Payor Plan Address Payor Plan Phone Number Payor Plan Fax Number Effective Dates    [address] [phone]  2019 - None Entered    [place] [address]       Subscriber Name Subscriber Birth Date Member ID       ROSEY MARADIAGA 1955 [id number]                 Emergency Contacts      (Rel.) Home Phone Work Phone Mobile Phone    KATELYN MARADIAGA (Daughter) [phone] -- --                 Discharge Summary      Mode Messina MD at 10/03/19 1337              Patient Name: Rosey Maradiaga  : 1955  MRN: [id number]    Date of Admission: 2019  Date of Discharge:  " 10/3/2019  Primary Care Physician: Heidi Jalloh, LEONEL      Chief Complaint:   No chief complaint on file.      Discharge Diagnoses     Active Hospital Problems    Diagnosis  POA   • **Left temporal headache [R51]  Yes   • Migraine [G43.909]  Yes   • Acute lacunar stroke (CMS/HCC) [I63.81]  Yes   • B12 deficiency [E53.8]  Yes   • Type 2 diabetes mellitus with hyperglycemia, without long-term current use of insulin (CMS/HCC) [E11.65]  Yes   • CAD (coronary artery disease) [I25.10]  Yes   • Hypocalcemia [E83.51]  Yes   • TIA (transient ischemic attack) [G45.9]  Yes   • CHF (congestive heart failure) (CMS/HCC) [I50.9]  Yes   • Stroke (CMS/HCC) - history of [I63.9]  Yes   • History of carotid endarterectomy [Z98.890]  Not Applicable   • Ataxia [R27.0]  Yes      Resolved Hospital Problems   No resolved problems to display.        Hospital Course     Mr. Maradiaga is a 64 y.o. male non-smoker with a history of DM2, CAD, CHF, CVA, Carotid Artery Disease who presented to [facility] initially complaining of dizziness, left sided weakness, and difficulty speaking.  Please see the admitting history and physical for further details. He was admitted with concern for TIA/CVA. Please see below for details of complicated admission:    Right lacunar infarct on repeat MRI, doesn't really correspond to pt's symptoms though and is not felt to be acute  CTA and Echo are okay  Continue statin (cholesterol high), ASA changed to Plavix given chronic changes in cervical circulation, was given dose this AM, but that has now been held as he needs procedure done on Tuesday 10/8 per Ophtho  PT/OT/ST evals all fine     Replaced B12 IM x 3 doses, defer further mgmt to pt's PCP (ie. Oral vs IM)     I don't think pt's calcium level is low enough to cause any neurologic symptoms and it is normal again today, iCa++ is also normal     I also don't think pt has temporal arteritis: his TA pulse is excellent and TA is non-tender, his ESR  is also normal  Started on high dose oral Prednisone per Neuro and Ophtho was consulted  Ophtho doubts this is TA as well, but recommends bilateral TA biopsies anyway, they can't do until Tuesday which works out perfectly as he got a dose of Plavix this AM, he'll need 5d off the Plavix prior to procedure so this should work out fine  Ophtho is fine with pt's dc today on Prednisone  Defer steroid taper to Ophtho based on results of biopsy     Asked Gen Surg to see regarding his large tender hernias--Dr. Zuniga does not in fact feel pt has any hernias, he checked a CT A&P and it was fine     Sugars and WBC are high due to steroids, HgbA1c is 9.5, restart Metformin at discharge    Home today with family and [facility]    Day of Discharge     Multiple somatic complaints today, no changes, nothing of acute concern    Physical Exam:  Temp:  [97.7 °F (36.5 °C)-98.3 °F (36.8 °C)] 98.3 °F (36.8 °C)  Heart Rate:  [77-88] 88  Resp:  [16-18] 18  BP: (121-141)/(83-93) 132/86  Body mass index is 27.47 kg/m².  Physical Exam  General Appearance:  Comfortable and in no acute distress.     Vital signs are normal.  No fever.    Output: Producing urine.    HEENT: Normal HEENT exam.    Lungs:  Normal effort and normal respiratory rate.  Breath sounds clear to auscultation.  He is not in respiratory distress.    Heart: Normal rate.  Regular rhythm.  No murmur.   Abdomen: Abdomen is soft. Bowel sounds are normal.     Extremities: There is no dependent edema.    Pulses: Distal pulses are intact.    Neurological: Patient is alert and oriented to person, place and time.  Normal strength.  Patient has normal muscle tone.  (Pt has subjective loss of sensation on left side, he claims he is weak on the left, but has no drift).    Pupils:  Pupils are equal, round, and reactive to light.  (No photophobia).    Skin:  Warm and dry.  No rash.     Consultants     Consult Orders (all) (From admission, onward)    Start     Ordered    10/01/19 4160   Inpatient General Surgery Consult  Once     Specialty:  General Surgery  Provider:  Ramiro Zuniga MD    10/01/19 1540    10/01/19 0702  Inpatient Ophthalmology Consult  IN AM     Specialty:  Ophthalmology  Provider:  Nguyen Cantu MD    09/30/19 1849    10/01/19 0000  Inpatient Neuro Clinical Specialist Consult  Once     Provider:  (Not yet assigned)    09/30/19 1709 09/30/19 1709  Inpatient Neurology Consult Stroke  Once     Specialty:  Neurology  Provider:  Ricardo Mead MD    09/30/19 1709 09/30/19 1707  Inpatient Rehab Admission Consult  Once     Provider:  (Not yet assigned)    09/30/19 1709 09/30/19 1707  Inpatient Case Management  Consult  Once     Provider:  (Not yet assigned)    09/30/19 1709 09/30/19 1707  Inpatient Diabetes Educator Consult  Once,   Status:  Canceled     Provider:  (Not yet assigned)    09/30/19 1709        Procedures     * Surgery not found *    Imaging Results (all)     Procedure Component Value Units Date/Time    CT Abdomen Pelvis Without Contrast [[id number]] Collected:  10/02/19 1530     Updated:  10/03/19 0857    Narrative:       CT OF THE ABDOMEN AND PELVIS WITHOUT CONTRAST 10/02/2019     HISTORY: Evaluate abdominal wall for possible hernia.     TECHNIQUE: Spiral images were obtained from the lung bases to the  symphysis pubis. No intravenous or oral contrast was given.     FINDINGS:  There is contrast material in the urinary bladder from  yesterday's CT angiogram.     There is some mild thinning of the rectus musculature. No abdominal wall  hernias are seen.     There is particulate debris in the stomach. Gallbladder has been  removed. The liver, spleen, pancreas and adrenals appear unremarkable. A  subtle low-density lesion is seen in the right kidney on image 62  possibly a tiny cyst on this unenhanced scan.     Prostate gland is mildly enlarged.       Impression:       1. No abdominal wall hernias or other acute process  identified.  2. Status post cholecystectomy.  3. Mild prostate gland enlargement.     Radiation dose reduction techniques were utilized, including automated  exposure control and exposure modulation based on body size.     This report was finalized on 10/3/2019 8:54 AM by Dr. Bunny Mcgregor M.D.       CT Angiogram Head With Contrast [[id number]] Collected:  10/02/19 0809     Updated:  10/02/19 0946    Narrative:       CONTRAST-ENHANCED CT ANGIOGRAM OF THE HEAD AND NECK 10/01/2019     CLINICAL HISTORY: Stroke. The patient has ataxia and slurred speech.     TECHNIQUE: Spiral CT images were obtained from the base of the skull to  the vertex both pre and post intravenous contrast. Images were  reformatted and submitted in 3 mm thick axial CT sections with brain  algorithm. Additional spiral CT images were obtained from the top of the  aortic arch up through the great vessels of the head and neck during  arterial phase of contrast. Images were reformatted and submitted in 1  mm thick axial, sagittal and coronal CT sections. Additional 3D  reconstructions were performed to complete the CT angiogram of the head  and neck.     This is correlated to an MRI of the brain from [facility]  10/01/2019.     FINDINGS:     HEAD CT: There is some mild low-density and periventricular white matter  consistent with mild small vessel disease. There is an 8 x 6 mm old  lacunar infarct in the lateral left thalamus. There is a tiny 7 x 4 mm  old posterior superior right frontal cortical infarct in the right MCA  territory that is better seen on prior MRIs. The ventricles are normal  in size. I see no mass effect and no midline shift and no extra-axial  fluid collections are identified. There is no evidence of acute  intracranial hemorrhage. No abnormal areas of enhancement are seen in  the head. There has been previous paranasal sinus surgery with a left  uncinectomy and there is minimal left ethmoidectomy and there is  mucosal  thickening in the inferior medial left frontal sinus, left frontal  recess, left ethmoid cavity and circumferentially in the somewhat  hypoplastic left maxillary sinus. The remainder of the paranasal sinuses  and mastoid air cells and middle ear cavities are clear.     CT ANGIOGRAM OF THE NECK: The nasopharynx, oropharynx, hypopharynx, true  cords and subglottic airway are normal in appearance. The thyroid gland  enhances homogeneously and is normal in appearance. The lung apices are  clear. The parotid, , parapharyngeal and submandibular spaces  are symmetric and are normal in appearance. The patient has had a  previous anterior cervical discectomy and fusion procedure C5-C7 with  anterior plate and screw fixation, disc implants in the C5-6 and C6-7  disc space, some residual cervical spondylosis with moderate left facet  arthropathy mildly narrowing the left foramen at C2-3, moderately  narrowing the left foramen at C3-4. There is some posterior bony  overgrowth resulting in mild narrowing of the central to right side of  the canal at C5-6 and there is some posterior bony spurring  mild-to-moderately narrowing the left side of the canal at C6-7 and  there is mild-to-moderate narrowing of the left foramen at C6-7. There  is common origin of the left common carotid artery and brachiocephalic  artery off the aortic arch constituting a bovine configuration of the  aortic arch which is a normal anatomic variation. The left subclavian  artery origin is normal in appearance, no stenosis is seen in left  subclavian artery. The left vertebral artery origin is normal in  appearance and no stenosis is seen in the left vertebral artery from its  origin to the vertebrobasilar junction. The left common carotid origin  is normal in appearance and no stenosis is seen. The left common carotid  artery has mixed calcified and noncalcified plaque circumscribing the  origin and proximal aspect of the left internal  carotid artery. There is  maximally a 20% stenosis of the proximal left internal carotid artery  using the NASCET criteria. Brachiocephalic artery origins are normal in  appearance. No stenosis is seen in the brachiocephalic artery, its  bifurcation into the right subclavian and common carotid artery is  normal in appearance. No stenosis is seen in the right subclavian  artery. The right vertebral artery origin is normal in appearance. No  stenosis is seen in the right vertebral artery from its origin to its  intracranial segment where there is stenosis of the intracranial segment  of the right vertebral artery. The right common carotid origin is normal  in appearance. No stenosis is seen in the right common carotid artery.  Its bifurcation into the right internal and external carotid arteries is  within normal limits, no stenosis is seen in the right internal carotid  artery using the NASCET criteria.      CT ANGIOGRAM OF THE HEAD: CT angiogram images of the head demonstrate  mild narrowing of the intracranial segment of the dominant distal left  vertebral artery. There is moderate-to-severe circumferential stenosis  of the proximal intracranial segment of the right vertebral artery and  then it is widely patent to the PICA origin and then the immediate post  PICA segment is moderately stenotic and then it is widely patent to the  vertebrobasilar junction. The basilar artery and basilar tip is normal  in appearance. There is mild-to-moderate stenosis of the proximal P3  segment of the right posterior cerebral artery. Otherwise posterior  cerebral and superior cerebellar arteries are within normal limits. The  upper cervical, petrous, cavernous and supracavernous segment of the  left internal carotid artery is normal in appearance. The upper cervical  petrous segment of the right internal carotid is normal in appearance.  There is moderate stenosis of the distal cavernous segment of the right  internal carotid  artery. The supracavernous segment is widely patent.  There is an atretic A1 segment to the right anterior cerebral artery and  the dominant left A1 segment supplies both A2 segments via a normal  appearing anterior communicating artery. The visualized A2 and A3  segments of the anterior cerebral arteries are widely patent without  stenosis. The M1 segment of the middle cerebral arteries and middle  cerebral artery trifurcations are within normal limits.       Impression:       1. CT images through the head are without change when compared to MRI of  the brain earlier in the day 10/01/2019 at 6:00 AM. There is mild small  vessel disease in the cerebral white matter and an 8 x 6 mm old lacunar  type infarct in the lateral left thalamus. The tiny 7 x 4 mm old  posterior superior right frontal cortical infarct is better seen on  prior MRI than on the current CT angiogram, it is in the distribution of  the posterior superior frontal branch of the right MCA territory. No  acute intracranial abnormality is seen with no acute infarct or  intracranial hemorrhage seen.     2. CT angiogram images through the neck demonstrate circumferential  atherosclerotic plaque resulting in very mild less than 20% stenosis  proximal left internal carotid artery using the NASCET criteria,  otherwise no stenosis is seen in the great vessels of the neck.     3. CT angiogram images through the head demonstrate mild narrowing of  the intracranial segment of the dominant distal left vertebral artery.  There is moderate-to-severe circumferential narrowing of the proximal  intracranial segment of the distal right vertebral artery, then it is  widely open to the right PICA origin. The immediate post PICA segment  has a moderate-to-severe stenosis but it is patent to the  vertebrobasilar junction. There is mild-to-moderate stenosis of the  proximal P3 segment of the right posterior cerebral artery. The findings  are on the basis of some intracranial  atherosclerotic disease.     4. There has been previous cervical spine surgery, the anterior  discectomy and fusion procedure C5-C7. There are areas of residual  cervical canal and foraminal narrowing as described above. The remainder  of the CT angiogram of the head and neck is unremarkable.      Radiation dose reduction techniques were utilized, including automated  exposure control and exposure modulation based on body size.     This report was finalized on 10/2/2019 9:43 AM by Dr. Ankur Diane M.D.       CT Angiogram Carotids [[id number]] Collected:  10/02/19 0809     Updated:  10/02/19 0946    Narrative:       CONTRAST-ENHANCED CT ANGIOGRAM OF THE HEAD AND NECK 10/01/2019     CLINICAL HISTORY: Stroke. The patient has ataxia and slurred speech.     TECHNIQUE: Spiral CT images were obtained from the base of the skull to  the vertex both pre and post intravenous contrast. Images were  reformatted and submitted in 3 mm thick axial CT sections with brain  algorithm. Additional spiral CT images were obtained from the top of the  aortic arch up through the great vessels of the head and neck during  arterial phase of contrast. Images were reformatted and submitted in 1  mm thick axial, sagittal and coronal CT sections. Additional 3D  reconstructions were performed to complete the CT angiogram of the head  and neck.     This is correlated to an MRI of the brain from [facility]  10/01/2019.     FINDINGS:     HEAD CT: There is some mild low-density and periventricular white matter  consistent with mild small vessel disease. There is an 8 x 6 mm old  lacunar infarct in the lateral left thalamus. There is a tiny 7 x 4 mm  old posterior superior right frontal cortical infarct in the right MCA  territory that is better seen on prior MRIs. The ventricles are normal  in size. I see no mass effect and no midline shift and no extra-axial  fluid collections are identified. There is no evidence of acute  intracranial  hemorrhage. No abnormal areas of enhancement are seen in  the head. There has been previous paranasal sinus surgery with a left  uncinectomy and there is minimal left ethmoidectomy and there is mucosal  thickening in the inferior medial left frontal sinus, left frontal  recess, left ethmoid cavity and circumferentially in the somewhat  hypoplastic left maxillary sinus. The remainder of the paranasal sinuses  and mastoid air cells and middle ear cavities are clear.     CT ANGIOGRAM OF THE NECK: The nasopharynx, oropharynx, hypopharynx, true  cords and subglottic airway are normal in appearance. The thyroid gland  enhances homogeneously and is normal in appearance. The lung apices are  clear. The parotid, , parapharyngeal and submandibular spaces  are symmetric and are normal in appearance. The patient has had a  previous anterior cervical discectomy and fusion procedure C5-C7 with  anterior plate and screw fixation, disc implants in the C5-6 and C6-7  disc space, some residual cervical spondylosis with moderate left facet  arthropathy mildly narrowing the left foramen at C2-3, moderately  narrowing the left foramen at C3-4. There is some posterior bony  overgrowth resulting in mild narrowing of the central to right side of  the canal at C5-6 and there is some posterior bony spurring  mild-to-moderately narrowing the left side of the canal at C6-7 and  there is mild-to-moderate narrowing of the left foramen at C6-7. There  is common origin of the left common carotid artery and brachiocephalic  artery off the aortic arch constituting a bovine configuration of the  aortic arch which is a normal anatomic variation. The left subclavian  artery origin is normal in appearance, no stenosis is seen in left  subclavian artery. The left vertebral artery origin is normal in  appearance and no stenosis is seen in the left vertebral artery from its  origin to the vertebrobasilar junction. The left common carotid  origin  is normal in appearance and no stenosis is seen. The left common carotid  artery has mixed calcified and noncalcified plaque circumscribing the  origin and proximal aspect of the left internal carotid artery. There is  maximally a 20% stenosis of the proximal left internal carotid artery  using the NASCET criteria. Brachiocephalic artery origins are normal in  appearance. No stenosis is seen in the brachiocephalic artery, its  bifurcation into the right subclavian and common carotid artery is  normal in appearance. No stenosis is seen in the right subclavian  artery. The right vertebral artery origin is normal in appearance. No  stenosis is seen in the right vertebral artery from its origin to its  intracranial segment where there is stenosis of the intracranial segment  of the right vertebral artery. The right common carotid origin is normal  in appearance. No stenosis is seen in the right common carotid artery.  Its bifurcation into the right internal and external carotid arteries is  within normal limits, no stenosis is seen in the right internal carotid  artery using the NASCET criteria.      CT ANGIOGRAM OF THE HEAD: CT angiogram images of the head demonstrate  mild narrowing of the intracranial segment of the dominant distal left  vertebral artery. There is moderate-to-severe circumferential stenosis  of the proximal intracranial segment of the right vertebral artery and  then it is widely patent to the PICA origin and then the immediate post  PICA segment is moderately stenotic and then it is widely patent to the  vertebrobasilar junction. The basilar artery and basilar tip is normal  in appearance. There is mild-to-moderate stenosis of the proximal P3  segment of the right posterior cerebral artery. Otherwise posterior  cerebral and superior cerebellar arteries are within normal limits. The  upper cervical, petrous, cavernous and supracavernous segment of the  left internal carotid artery is normal  in appearance. The upper cervical  petrous segment of the right internal carotid is normal in appearance.  There is moderate stenosis of the distal cavernous segment of the right  internal carotid artery. The supracavernous segment is widely patent.  There is an atretic A1 segment to the right anterior cerebral artery and  the dominant left A1 segment supplies both A2 segments via a normal  appearing anterior communicating artery. The visualized A2 and A3  segments of the anterior cerebral arteries are widely patent without  stenosis. The M1 segment of the middle cerebral arteries and middle  cerebral artery trifurcations are within normal limits.       Impression:       1. CT images through the head are without change when compared to MRI of  the brain earlier in the day 10/01/2019 at 6:00 AM. There is mild small  vessel disease in the cerebral white matter and an 8 x 6 mm old lacunar  type infarct in the lateral left thalamus. The tiny 7 x 4 mm old  posterior superior right frontal cortical infarct is better seen on  prior MRI than on the current CT angiogram, it is in the distribution of  the posterior superior frontal branch of the right MCA territory. No  acute intracranial abnormality is seen with no acute infarct or  intracranial hemorrhage seen.     2. CT angiogram images through the neck demonstrate circumferential  atherosclerotic plaque resulting in very mild less than 20% stenosis  proximal left internal carotid artery using the NASCET criteria,  otherwise no stenosis is seen in the great vessels of the neck.     3. CT angiogram images through the head demonstrate mild narrowing of  the intracranial segment of the dominant distal left vertebral artery.  There is moderate-to-severe circumferential narrowing of the proximal  intracranial segment of the distal right vertebral artery, then it is  widely open to the right PICA origin. The immediate post PICA segment  has a moderate-to-severe stenosis but it  is patent to the  vertebrobasilar junction. There is mild-to-moderate stenosis of the  proximal P3 segment of the right posterior cerebral artery. The findings  are on the basis of some intracranial atherosclerotic disease.     4. There has been previous cervical spine surgery, the anterior  discectomy and fusion procedure C5-C7. There are areas of residual  cervical canal and foraminal narrowing as described above. The remainder  of the CT angiogram of the head and neck is unremarkable.      Radiation dose reduction techniques were utilized, including automated  exposure control and exposure modulation based on body size.     This report was finalized on 10/2/2019 9:43 AM by Dr. Ankur Diane M.D.       MRI Brain With & Without Contrast [[id number]] Collected:  10/01/19 0857     Updated:  10/01/19 1508    Narrative:       MRI BRAIN WITH AND WITHOUT CONTRAST     HISTORY: Stroke, scalp pain, ataxia, slurred speech. TIA.     COMPARISON: MRI brain 09/30/2019.     TECHNIQUE: A MRI examination of the brain was performed before and after  the intravenous administration of contrast utilizing sagittal T1, axial  diffusion, T1, T2, T2 FLAIR, gradient echo T2 as well as axial and  coronal T1 postcontrast weighted sequences.     There is no evidence of restricted diffusion to suggest acute  infarction. There is expected flow-void in the basilar artery and in the  distal aspect of internal carotid arteries bilaterally on the axial T2  sequence. Increased signal intensity is present involving the white  matter of the cerebral hemispheres bilaterally on the T2 FLAIR sequence  nonspecific. There is expected flow-void in the basilar artery and in  the distal aspect of the internal carotid arteries bilaterally on the  axial T2 sequence. There is a small amount of fluid present within the  mastoid air cells on the right.     A lacunar infarct involving the thalamus on the left is appreciated  measuring 9 mm in size.     After contrast  administration there was no evidence of abnormal  enhancement.       Impression:       Small vessel ischemic disease with no evidence of acute  infarction, mass or of abnormal enhancement. A small lacunar infarct  involving the thalamus on the left is appreciated. There is a trace  amount of fluid present within the mastoid process on the right.           This report was finalized on 10/1/2019 3:05 PM by Dr. Carl Garcia M.D.                  Results for orders placed during the hospital encounter of 09/30/19   Adult Transthoracic Echo Complete W/ Cont if Necessary Per Protocol (With Agitated Saline)    Narrative · Calculated EF = 56.0%. Estimated EF was in agreement with the calculated   EF. Normal left ventricular cavity size and wall thickness noted. All left   ventricular wall segments contract normally. Left ventricular diastolic   dysfunction is noted (grade I) consistent with impaired relaxation. There   is no evidence of a left ventricular mass or thrombus present.  · No evidence of a patent foramen ovale. No evidence of an atrial septal   defect present. . Saline test results are negative.        Pertinent Labs     Results from last 7 days   Lab Units 10/03/19  0701 10/02/19  0700 09/30/19  1722   WBC 10*3/mm3 15.12* 16.59* 9.73   HEMOGLOBIN g/dL 13.9 14.7 14.3   PLATELETS 10*3/mm3 153 151 136*     Results from last 7 days   Lab Units 10/03/19  0701 10/02/19  0700 09/30/19  1723   SODIUM mmol/L 134* 139 138   POTASSIUM mmol/L 4.4 4.5 4.1   CHLORIDE mmol/L 95* 99 99   CO2 mmol/L 30.4* 28.2 29.0   BUN mg/dL 17 15 11   CREATININE mg/dL 0.83 0.96 0.88   GLUCOSE mg/dL 328* 338* 206*   Estimated Creatinine Clearance: 107.9 mL/min (by C-G formula based on SCr of 0.83 mg/dL).  Results from last 7 days   Lab Units 10/02/19  0700 09/30/19  1723   ALBUMIN g/dL 4.00 3.60   BILIRUBIN mg/dL 0.3 0.3   ALK PHOS U/L 102 87   AST (SGOT) U/L 9 15   ALT (SGPT) U/L 8 8     Results from last 7 days   Lab Units 10/03/19  0701  10/02/19  0700 09/30/19  1723   CALCIUM mg/dL 8.6 9.0 8.2*   ALBUMIN g/dL  --  4.00 3.60           Results from last 7 days   Lab Units 10/01/19  0334   CHOLESTEROL mg/dL 226*   TRIGLYCERIDES mg/dL 144   HDL CHOL mg/dL 41   LDL CHOL mg/dL 156*           Test Results Pending at Discharge   None    Discharge Details        Discharge Medications      New Medications      Instructions Start Date   atorvastatin 80 MG tablet  Commonly known as:  LIPITOR   80 mg, Oral, Nightly      clopidogrel 75 MG tablet  Commonly known as:  PLAVIX   75 mg, Oral, Daily   Start Date:  10/9/2019     famotidine 20 MG tablet  Commonly known as:  PEPCID   20 mg, Oral, 2 Times Daily Before Meals      predniSONE 20 MG tablet  Commonly known as:  DELTASONE   60 mg, Oral, Daily         Continue These Medications      Instructions Start Date   DULoxetine 60 MG capsule  Commonly known as:  CYMBALTA   60 mg, Oral, Nightly      gabapentin 400 MG capsule  Commonly known as:  NEURONTIN   400 mg, Oral, 3 Times Daily      metFORMIN 500 MG tablet  Commonly known as:  GLUCOPHAGE   500 mg, Oral, 2 Times Daily With Meals      oxyCODONE-acetaminophen 7.5-325 MG per tablet  Commonly known as:  PERCOCET   1 tablet, Oral, 2 Times Daily             Allergies   Allergen Reactions   • Elavil [Amitriptyline Hcl] Rash         Discharge Disposition:  Home-Health Care Svc    Discharge Diet:  Diet Order   Procedures   • Diet Regular; Consistent Carbohydrate, Cardiac       Discharge Activity:   as tolerated    CODE STATUS:    Code Status and Medical Interventions:   Ordered at: 09/30/19 1709     Code Status:    CPR     Medical Interventions (Level of Support Prior to Arrest):    Full       No future appointments.  Additional Instructions for the Follow-ups that You Need to Schedule     Ambulatory Referral to Home Health   As directed      Face to Face Visit Date:  10/3/2019    Follow-up provider for Plan of Care?:  I treated the patient in an acute care facility and will  not continue treatment after discharge.    Follow-up provider:  HEIDI JALLOH [[id number]]    Reason/Clinical Findings:  Headache, TIA, possible temporal arteritis, DM2, steroid therapy    Describe mobility limitations that make leaving home difficult:  requires the assistance of another to leave the home    Nursing/Therapeutic Services Requested:  Skilled Nursing    Skilled nursing orders:  Medication education    Frequency:  1 Week 1         Discharge Follow-up with PCP   As directed       Currently Documented PCP:    Heidi Jalloh APRN    PCP Phone Number:    [phone]     Follow Up Details:  Shubham NP (PCP) in 1 week         Discharge Follow-up with Specified Provider: Solomon Riddles; 3 Weeks   As directed      To:  [facility]    Follow Up:  3 Weeks    Follow Up Details:  re: headache         Discharge Follow-up with Specified Provider: Dr. Noriega ([facility])   As directed      To:  Dr. Noriega ([facility])    Follow Up Details:  Needs biopsy of temporal arteries as outpt on Tuesday per his recs in chart           Follow-up Information     Heidi Jalloh APRN .    Specialty:  Nurse Practitioner  Why:  Shubham NP (PCP) in 1 week  Contact information:  [address]  [place] [address]  [phone]                   Additional Instructions for the Follow-ups that You Need to Schedule     Ambulatory Referral to Home Health   As directed      Face to Face Visit Date:  10/3/2019    Follow-up provider for Plan of Care?:  I treated the patient in an acute care facility and will not continue treatment after discharge.    Follow-up provider:  HEIDI JALLOH [[id number]]    Reason/Clinical Findings:  Headache, TIA, possible temporal arteritis, DM2, steroid therapy    Describe mobility limitations that make leaving home difficult:  requires the assistance of another to leave the home    Nursing/Therapeutic Services Requested:  Skilled Nursing    Skilled nursing  orders:  Medication education    Frequency:  1 Week 1         Discharge Follow-up with PCP   As directed       Currently Documented PCP:    Heidi Jalloh APRN    PCP Phone Number:    [phone]     Follow Up Details:  Shubham NP (PCP) in 1 week         Discharge Follow-up with Specified Provider: Solomon Riddles; 3 Weeks   As directed      To:  [facility]    Follow Up:  3 Weeks    Follow Up Details:  re: headache         Discharge Follow-up with Specified Provider: Dr. Noriega ([facility])   As directed      To:  Dr. Noriega ([facility])    Follow Up Details:  Needs biopsy of temporal arteries as outpt on Tuesday per his recs in chart           Time Spent on Discharge:  Greater than 30 minutes      Asher Messina MD  [facility]  10/03/19  1:37 PM                Electronically signed by Asher Messina MD at 10/03/19 1350       Discharge Order (From admission, onward)    Start     Ordered    10/03/19 1332  Discharge patient  Once     Expected Discharge Date:  10/03/19    Discharge Disposition:  [facility]Health Care [facility]    Physician of Record for Attribution - Please select from Treatment Team:  ASHER MESSINA [[id number]]    Review needed by CMO to determine Physician of Record:  No       Question Answer Comment   Physician of Record for Attribution - Please select from Treatment Team ASHER MESSINA    Review needed by CMO to determine Physician of Record No        10/03/19 6679

## 2019-10-03 NOTE — PLAN OF CARE
Problem: Patient Care Overview  Goal: Plan of Care Review  Outcome: Ongoing (interventions implemented as appropriate)   10/03/19 0608   Coping/Psychosocial   Plan of Care Reviewed With patient;family   Plan of Care Review   Progress improving   OTHER   Outcome Summary pt A/O x 4. no new neuro changes. no falls through the night. pt still c/o of left sided weakness and blurry peripheral vision in the left eye. Opthalmology to see patient today. O2 dropped to high 70s, placed pt on 3L NC per patient request. will CTM.        Problem: Fall Risk (Adult)  Goal: Absence of Fall  Outcome: Ongoing (interventions implemented as appropriate)      Problem: Stroke (Ischemic) (Adult)  Goal: Signs and Symptoms of Listed Potential Problems Will be Absent, Minimized or Managed (Stroke)  Outcome: Ongoing (interventions implemented as appropriate)

## 2019-10-03 NOTE — PLAN OF CARE
Problem: Patient Care Overview  Goal: Plan of Care Review  Outcome: Ongoing (interventions implemented as appropriate)   10/03/19 1101   Coping/Psychosocial   Plan of Care Reviewed With patient   Plan of Care Review   Progress improving   OTHER   Outcome Summary Pt tolerated treatment well this date. Ambulated 150ft w/ Rw and CGA. No unsteadiness noted. Pt states vision is still a little blurry, though did not affect mobility. PT will continue to address functional mobility deficits as tolerated.

## 2019-10-03 NOTE — PROGRESS NOTES
Continued Stay Note  [facility]     Patient Name: Ramiro Maradiaga  MRN: [id number]  Today's Date: 10/3/2019    Admit Date: 9/30/2019    Discharge Plan     Row Name 10/03/19 1538       Plan    Plan Comments  spoke with Meena / Jarred  to update on dc.  Call to \[facility]\"" / [phone] surgery scheduler for Dr. Alanis to set up out pt surgery for pt on Oct 8. Gave her pt's info and contact number.  instructed pt that Dr. Alanis's office will contact him with info.  Alba Romeo RN/CCP    Row Name 10/03/19 1533       Plan    Plan  Home with [facility]        Discharge Codes    No documentation.       Expected Discharge Date and Time     Expected Discharge Date Expected Discharge Time    Oct 3, 2019             Alba Romeo RN

## 2019-10-03 NOTE — THERAPY TREATMENT NOTE
Patient Name: Ramiro Maradiaga  : 1955    MRN: [id number]                              Today's Date: 10/3/2019       Admit Date: 2019    Visit Dx: No diagnosis found.  Patient Active Problem List   Diagnosis   • TIA (transient ischemic attack)   • CHF (congestive heart failure) (CMS/[facility])   • Stroke (CMS/[facility]) - history of   • History of carotid endarterectomy   • Ataxia   • Acute lacunar stroke (CMS/[facility])   • B12 deficiency   • Type 2 diabetes mellitus with hyperglycemia, without long-term current use of insulin (CMS/[facility])   • Left temporal headache   • CAD (coronary artery disease)   • Hypocalcemia   • Migraine     Past Medical History:   Diagnosis Date   • Arthritis    • CHF (congestive heart failure) (CMS/[facility])    • Coronary artery disease    • Diabetes mellitus (CMS/[facility])    • Fracture cervical vertebra-closed (CMS/[facility])    • Fracture of lumbar spine (CMS/[facility]) 2016   • Stroke (CMS/[facility])      Past Surgical History:   Procedure Laterality Date   • BREAST LUMPECTOMY Right    • CARDIAC SURGERY      quadruple bypass,valve repair   • EYE SURGERY      bilateral cataract surgery   • SPINAL FIXATION SURGERY W/ IMPLANT N/A      General Information     Row Name 10/03/19 1053          PT Evaluation Time/Intention    Document Type  therapy note (daily note)  -     Mode of Treatment  physical therapy  -     Row Name 10/03/19 1053          General Information    Existing Precautions/Restrictions  fall;oxygen therapy device and L/min  -     Row Name 10/03/19 1053          Cognitive Assessment/Intervention- PT/OT    Orientation Status (Cognition)  oriented x 3  -       User Key  (r) = Recorded By, (t) = Taken By, (c) = Cosigned By    Initials Name Provider Type     Abida Harrison PTA Physical Therapy Assistant        Mobility     Row Name 10/03/19 1053          Bed Mobility Assessment/Treatment    Bed Mobility Assessment/Treatment  supine-sit  -     Supine-Sit [place] (Bed Mobility)  supervision  -      Sit-Supine [place] (Bed Mobility)  not tested  -     Assistive Device (Bed Mobility)  bed rails;head of bed elevated  -[facility] Name 10/03/19 1053          Sit-Stand Transfer    Sit-Stand [place] (Transfers)  stand by assist  -     Assistive Device (Sit-Stand Transfers)  walker, front-wheeled  -[facility] Name 10/03/19 1053          Gait/Stairs Assessment/Training    [place] Level (Gait)  contact guard  -     Assistive Device (Gait)  walker, front-wheeled  -     Distance in Feet (Gait)  150  -     Pattern (Gait)  step-through  -     Deviations/Abnormal Patterns (Gait)  missy decreased;stride length decreased  -     Bilateral Gait Deviations  forward flexed posture  -       User Key  (r) = Recorded By, (t) = Taken By, (c) = Cosigned By    Initials Name Provider Type    Abida Del Castillo PTA Physical Therapy Assistant        Obj/Interventions     [facility] Name 10/03/19 1055          Therapeutic Exercise    Lower Extremity (Therapeutic Exercise)  LAQ (long arc quad), bilateral;marching while seated  -     Lower Extremity Range of Motion (Therapeutic Exercise)  ankle dorsiflexion/plantar flexion, bilateral  -     Exercise Type (Therapeutic Exercise)  AROM (active range of motion)  -     Position (Therapeutic Exercise)  seated  -     Sets/Reps (Therapeutic Exercise)  10 reps  -       User Key  (r) = Recorded By, (t) = Taken By, (c) = Cosigned By    Initials Name Provider Type    Abida Del Castillo PTA Physical Therapy Assistant        Goals/Plan    No documentation.       Clinical Impression     [facility] Name 10/03/19 1055          Pain Assessment    Additional Documentation  Pain Scale: Numbers Pre/Post-Treatment (Group)  -SM     Row Name 10/03/19 1055          Pain Scale: Numbers Pre/Post-Treatment    Pain Scale: Numbers, Pretreatment  3/10  -     Pain Scale: Numbers, Post-Treatment  3/10  -SM     Pain Location  back  -     Pain Intervention(s)   Repositioned;Ambulation/increased activity;Rest  -     Row Name 10/03/19 1055          Positioning and Restraints    Pre-Treatment Position  in bed  -SM     Post Treatment Position  bed  -SM     In Bed  sitting EOB;call light within reach;encouraged to call for assist  -       User Key  (r) = Recorded By, (t) = Taken By, (c) = Cosigned By    Initials Name Provider Type    Abida Del Castillo PTA Physical Therapy Assistant        Outcome Measures     Row Name 10/03/19 1103          How much help from another person do you currently need...    Turning from your back to your side while in flat bed without using bedrails?  4  -SM     Moving from lying on back to sitting on the side of a flat bed without bedrails?  3  -SM     Moving to and from a bed to a chair (including a wheelchair)?  3  -SM     Standing up from a chair using your arms (e.g., wheelchair, bedside chair)?  3  -SM     Climbing 3-5 steps with a railing?  3  -SM     To walk in hospital room?  3  -SM     AM-PAC 6 Clicks Score (PT)  19  -     Row Name 10/03/19 1103          Functional Assessment    Outcome Measure Options  AM-PAC 6 Clicks Basic Mobility (PT)  -       User Key  (r) = Recorded By, (t) = Taken By, (c) = Cosigned By    Initials Name Provider Type    Abida Del Castillo PTA Physical Therapy Assistant        Physical Therapy Education     Title: PT OT SLP Therapies (Done)     Topic: Physical Therapy (Done)     Point: Mobility training (Done)     Learning Progress Summary           Patient Acceptance, E,TB,D, VU,NR by  at 10/3/2019 11:00 AM    Acceptance, E, VU,NR by CARL at 10/2/2019 10:00 AM    Acceptance, E,TB,D, VU,NR by LASHA at 10/1/2019  8:57 AM                   Point: Home exercise program (Done)     Learning Progress Summary           Patient Acceptance, E,TB,D, VU,NR by  at 10/3/2019 11:00 AM    Acceptance, E, VU,NR by CARL at 10/2/2019 10:00 AM                   Point: Body mechanics (Done)     Learning Progress Summary            Patient Acceptance, E,TB,D, VU,NR by  at 10/3/2019 11:00 AM    Acceptance, E, VU,NR by  at 10/2/2019 10:00 AM                   Point: Precautions (Done)     Learning Progress Summary           Patient Acceptance, E,TB,D, VU,NR by  at 10/3/2019 11:00 AM    Acceptance, E, VU,NR by  at 10/2/2019 10:00 AM                               User Key     Initials Effective Dates Name Provider Type Discipline    EF 06/08/18 -  Vibha Lr, PT Physical Therapist PT     04/03/18 -  Radha Kelley, PT Physical Therapist PT     03/07/18 -  Abida Harrison PTA Physical Therapy Assistant PT              PT Recommendation and Plan     Outcome Summary/Treatment Plan (PT)  Anticipated Discharge Disposition (PT): home with assist, home with home health  Plan of Care Reviewed With: patient  Progress: improving  Outcome Summary: Pt tolerated treatment well this date. Ambulated 150ft w/ Rw and CGA. No unsteadiness noted. Pt states vision is still a little blurry, though did not affect mobility. PT will continue to address functional mobility deficits as tolerated.     Time Calculation:   PT Charges     Row Name 10/03/19 1104             Time Calculation    Start Time  1025  -      Stop Time  1050  -      Time Calculation (min)  25 min  -      PT Received On  10/03/19  -      PT - Next Appointment  10/04/19  -        User Key  (r) = Recorded By, (t) = Taken By, (c) = Cosigned By    Initials Name Provider Type     Abida Harrison PTA Physical Therapy Assistant        Therapy Charges for Today     Code Description Service Date Service Provider Modifiers Qty    49051936844 HC PT THER PROC EA 15 MIN 10/3/2019 Abida Harrison PTA GP 1    84900029761 HC PT THERAPEUTIC ACT EA 15 MIN 10/3/2019 Abida Harrison PTA GP 1          PT G-Codes  Outcome Measure Options: AM-PAC 6 Clicks Basic Mobility (PT)  AM-PAC 6 Clicks Score (PT): 19  AM-PAC 6 Clicks Score (OT): 19  Modified Milagros Scale: 3 -  Moderate disability.  Requiring some help, but able to walk without assistance.    Abida Harrison, PTA  10/3/2019

## 2019-10-04 ENCOUNTER — READMISSION MANAGEMENT (OUTPATIENT)
Dept: CALL CENTER | Facility: HOSPITAL | Age: 64
End: 2019-10-04

## 2019-10-04 NOTE — PROGRESS NOTES
Case Management Discharge Note    Final Note: Pt was dc'd home with [facility]    Destination      No service has been selected for the patient.      Durable Medical Equipment      No service has been selected for the patient.      Dialysis/Infusion      No service has been selected for the patient.      Home Medical Care      No service has been selected for the patient.      Therapy      No service has been selected for the patient.      Community Resources      No service has been selected for the patient.        Transportation Services  Private: Car    Final Discharge Disposition Code: 06 - home with home health care   Problem: Anger Management/Homicidal Ideation:  Goal: Absence of homicidal ideation  Absence of homicidal ideation   Outcome: Ongoing  Patient denies homicidal ideation. Staff encouraged patient to communicate thoughts and feelings if they occur. Safety checks maintained.

## 2019-10-05 NOTE — OUTREACH NOTE
Prep Survey      Responses   Facility patient discharged from?  [place]   Is patient eligible?  Yes   Discharge diagnosis  left temporal headache,    Does the patient have one of the following disease processes/diagnoses(primary or secondary)?  Other   Does the patient have Home health ordered?  Yes   What is the Home health agency?   [facility]   Is there a DME ordered?  No   Medication alerts for this patient  plavix starting 10/9 after out patient surgery   Prep survey completed?  Yes          Diana Gallegos RN

## 2019-10-06 ENCOUNTER — READMISSION MANAGEMENT (OUTPATIENT)
Dept: CALL CENTER | Facility: HOSPITAL | Age: 64
End: 2019-10-06

## 2019-10-08 ENCOUNTER — TELEPHONE (OUTPATIENT)
Dept: NEUROLOGY | Facility: CLINIC | Age: 64
End: 2019-10-08

## 2019-10-08 NOTE — TELEPHONE ENCOUNTER
I will have my medical assistant call Susan back and let her know that I am okay with stopping Plavix, he will need to resume as soon as he can.    LEONEL Noriega

## 2019-10-08 NOTE — TELEPHONE ENCOUNTER
----- Message from Aleida Galloway sent at 10/8/2019  8:56 AM EDT -----  Contact: Susan estrada/[facility]  They are wanting to schedule pt for a procedure this Thursday and wants to know if it's ok for pt to stop plavix for 2 days prior.  Please call Susan at [phone].    ThanksAnitha

## 2019-10-09 ENCOUNTER — TELEPHONE (OUTPATIENT)
Dept: NEUROLOGY | Facility: CLINIC | Age: 64
End: 2019-10-09

## 2019-10-09 ENCOUNTER — READMISSION MANAGEMENT (OUTPATIENT)
Dept: CALL CENTER | Facility: HOSPITAL | Age: 64
End: 2019-10-09

## 2019-10-09 NOTE — TELEPHONE ENCOUNTER
----- Message from LEONEL Noriega sent at 10/8/2019  4:58 PM EDT -----  Regarding: Stopping Plavix  Sue, I received this message below. Can you please call this person back and let her know that I am okay with stopping Plavix he will need to resume as soon as they can. Thanks,    Jennifer    ----- Message from Aleida Galloway sent at 10/8/2019  8:56 AM EDT -----  Contact: Susan estrada/[facility]  They are wanting to schedule pt for a procedure this Thursday and wants to know if it's ok for pt to stop plavix for 2 days prior.  Please call Susan at [phone].     Anitha Purcell

## 2019-10-09 NOTE — OUTREACH NOTE
Medical Week 1 Survey      Responses   Facility patient discharged from?  [place]   Does the patient have one of the following disease processes/diagnoses(primary or secondary)?  Other   Is there a successful TCM telephone encounter documented?  No   Week 1 attempt successful?  No   Unsuccessful attempts  Attempt 2          Melissa Dobbins RN

## 2019-10-10 ENCOUNTER — READMISSION MANAGEMENT (OUTPATIENT)
Dept: CALL CENTER | Facility: HOSPITAL | Age: 64
End: 2019-10-10

## 2019-10-10 NOTE — OUTREACH NOTE
Medical Week 2 Survey      Responses   Facility patient discharged from?  [place]   Does the patient have one of the following disease processes/diagnoses(primary or secondary)?  Other   Week 2 attempt successful?  No   Unsuccessful attempts  Attempt 1          Melissa Dobbins RN

## 2019-10-11 ENCOUNTER — READMISSION MANAGEMENT (OUTPATIENT)
Dept: CALL CENTER | Facility: HOSPITAL | Age: 64
End: 2019-10-11

## 2019-10-11 NOTE — OUTREACH NOTE
Medical Week 2 Survey      Responses   Facility patient discharged from?  [facility]   Does the patient have one of the following disease processes/diagnoses(primary or secondary)?  Other   Week 2 attempt successful?  No   Unsuccessful attempts  Attempt 2          Lupe Dean RN

## 2019-11-16 ENCOUNTER — HOSPITAL ENCOUNTER (INPATIENT)
Facility: HOSPITAL | Age: 64
LOS: 15 days | Discharge: NURSING, REHAB OR LONG-TERM CARE | End: 2019-12-01
Attending: INTERNAL MEDICINE | Admitting: INTERNAL MEDICINE

## 2019-11-16 ENCOUNTER — APPOINTMENT (OUTPATIENT)
Dept: GENERAL RADIOLOGY | Facility: HOSPITAL | Age: 64
End: 2019-11-16

## 2019-11-16 DIAGNOSIS — G47.33 OSA (OBSTRUCTIVE SLEEP APNEA): ICD-10-CM

## 2019-11-16 DIAGNOSIS — I21.4 NSTEMI (NON-ST ELEVATED MYOCARDIAL INFARCTION) (HCC): Primary | ICD-10-CM

## 2019-11-16 LAB
ALBUMIN SERPL-MCNC: 3 G/DL (ref 3.5–5.2)
ALBUMIN/GLOB SERPL: 0.8 G/DL
ALP SERPL-CCNC: 94 U/L (ref 39–117)
ALT SERPL W P-5'-P-CCNC: 70 U/L (ref 1–41)
ANION GAP SERPL CALCULATED.3IONS-SCNC: 17.7 MMOL/L (ref 5–15)
APTT PPP: 29.7 SECONDS (ref 22.7–35.4)
AST SERPL-CCNC: 76 U/L (ref 1–40)
BASOPHILS # BLD AUTO: 0.03 10*3/MM3 (ref 0–0.2)
BASOPHILS # BLD AUTO: 0.05 10*3/MM3 (ref 0–0.2)
BASOPHILS NFR BLD AUTO: 0.2 % (ref 0–1.5)
BASOPHILS NFR BLD AUTO: 0.4 % (ref 0–1.5)
BILIRUB SERPL-MCNC: 0.9 MG/DL (ref 0.2–1.2)
BUN BLD-MCNC: 27 MG/DL (ref 8–23)
BUN/CREAT SERPL: 22.5 (ref 7–25)
CALCIUM SPEC-SCNC: 8 MG/DL (ref 8.6–10.5)
CHLORIDE SERPL-SCNC: 93 MMOL/L (ref 98–107)
CO2 SERPL-SCNC: 25.3 MMOL/L (ref 22–29)
CREAT BLD-MCNC: 1.2 MG/DL (ref 0.76–1.27)
D-LACTATE SERPL-SCNC: 2.6 MMOL/L (ref 0.5–2)
DEPRECATED RDW RBC AUTO: 44.3 FL (ref 37–54)
DEPRECATED RDW RBC AUTO: 45.8 FL (ref 37–54)
EOSINOPHIL # BLD AUTO: 0.02 10*3/MM3 (ref 0–0.4)
EOSINOPHIL # BLD AUTO: 0.02 10*3/MM3 (ref 0–0.4)
EOSINOPHIL NFR BLD AUTO: 0.2 % (ref 0.3–6.2)
EOSINOPHIL NFR BLD AUTO: 0.2 % (ref 0.3–6.2)
ERYTHROCYTE [DISTWIDTH] IN BLOOD BY AUTOMATED COUNT: 13.1 % (ref 12.3–15.4)
ERYTHROCYTE [DISTWIDTH] IN BLOOD BY AUTOMATED COUNT: 13.1 % (ref 12.3–15.4)
GFR SERPL CREATININE-BSD FRML MDRD: 61 ML/MIN/1.73
GLOBULIN UR ELPH-MCNC: 3.6 GM/DL
GLUCOSE BLD-MCNC: 469 MG/DL (ref 65–99)
GLUCOSE BLDC GLUCOMTR-MCNC: 477 MG/DL (ref 70–130)
HBA1C MFR BLD: 12.4 % (ref 4.8–5.6)
HCT VFR BLD AUTO: 37 % (ref 37.5–51)
HCT VFR BLD AUTO: 37.1 % (ref 37.5–51)
HGB BLD-MCNC: 12.3 G/DL (ref 13–17.7)
HGB BLD-MCNC: 12.8 G/DL (ref 13–17.7)
IMM GRANULOCYTES # BLD AUTO: 0.1 10*3/MM3 (ref 0–0.05)
IMM GRANULOCYTES # BLD AUTO: 0.11 10*3/MM3 (ref 0–0.05)
IMM GRANULOCYTES NFR BLD AUTO: 0.8 % (ref 0–0.5)
IMM GRANULOCYTES NFR BLD AUTO: 0.9 % (ref 0–0.5)
LYMPHOCYTES # BLD AUTO: 1.24 10*3/MM3 (ref 0.7–3.1)
LYMPHOCYTES # BLD AUTO: 1.25 10*3/MM3 (ref 0.7–3.1)
LYMPHOCYTES NFR BLD AUTO: 10 % (ref 19.6–45.3)
LYMPHOCYTES NFR BLD AUTO: 10.2 % (ref 19.6–45.3)
MCH RBC QN AUTO: 31.9 PG (ref 26.6–33)
MCH RBC QN AUTO: 32.8 PG (ref 26.6–33)
MCHC RBC AUTO-ENTMCNC: 33.2 G/DL (ref 31.5–35.7)
MCHC RBC AUTO-ENTMCNC: 34.6 G/DL (ref 31.5–35.7)
MCV RBC AUTO: 94.9 FL (ref 79–97)
MCV RBC AUTO: 96.4 FL (ref 79–97)
MONOCYTES # BLD AUTO: 0.85 10*3/MM3 (ref 0.1–0.9)
MONOCYTES # BLD AUTO: 0.88 10*3/MM3 (ref 0.1–0.9)
MONOCYTES NFR BLD AUTO: 6.9 % (ref 5–12)
MONOCYTES NFR BLD AUTO: 7.2 % (ref 5–12)
NEUTROPHILS # BLD AUTO: 10.15 10*3/MM3 (ref 1.7–7)
NEUTROPHILS # BLD AUTO: 9.95 10*3/MM3 (ref 1.7–7)
NEUTROPHILS NFR BLD AUTO: 81.2 % (ref 42.7–76)
NEUTROPHILS NFR BLD AUTO: 81.8 % (ref 42.7–76)
NRBC BLD AUTO-RTO: 0.1 /100 WBC (ref 0–0.2)
NRBC BLD AUTO-RTO: 0.2 /100 WBC (ref 0–0.2)
PLATELET # BLD AUTO: 172 10*3/MM3 (ref 140–450)
PLATELET # BLD AUTO: 176 10*3/MM3 (ref 140–450)
PMV BLD AUTO: 10.9 FL (ref 6–12)
PMV BLD AUTO: 11.1 FL (ref 6–12)
POTASSIUM BLD-SCNC: 3.5 MMOL/L (ref 3.5–5.2)
PROT SERPL-MCNC: 6.6 G/DL (ref 6–8.5)
RBC # BLD AUTO: 3.85 10*6/MM3 (ref 4.14–5.8)
RBC # BLD AUTO: 3.9 10*6/MM3 (ref 4.14–5.8)
SODIUM BLD-SCNC: 136 MMOL/L (ref 136–145)
TROPONIN T SERPL-MCNC: 0.84 NG/ML (ref 0–0.03)
WBC NRBC COR # BLD: 12.25 10*3/MM3 (ref 3.4–10.8)
WBC NRBC COR # BLD: 12.4 10*3/MM3 (ref 3.4–10.8)

## 2019-11-16 PROCEDURE — 85025 COMPLETE CBC W/AUTO DIFF WBC: CPT | Performed by: INTERNAL MEDICINE

## 2019-11-16 PROCEDURE — 80053 COMPREHEN METABOLIC PANEL: CPT | Performed by: INTERNAL MEDICINE

## 2019-11-16 PROCEDURE — 85730 THROMBOPLASTIN TIME PARTIAL: CPT | Performed by: NEUROLOGICAL SURGERY

## 2019-11-16 PROCEDURE — 83036 HEMOGLOBIN GLYCOSYLATED A1C: CPT | Performed by: INTERNAL MEDICINE

## 2019-11-16 PROCEDURE — 71045 X-RAY EXAM CHEST 1 VIEW: CPT

## 2019-11-16 PROCEDURE — 85027 COMPLETE CBC AUTOMATED: CPT | Performed by: INTERNAL MEDICINE

## 2019-11-16 PROCEDURE — 83605 ASSAY OF LACTIC ACID: CPT | Performed by: INTERNAL MEDICINE

## 2019-11-16 PROCEDURE — 25010000002 ENOXAPARIN PER 10 MG: Performed by: INTERNAL MEDICINE

## 2019-11-16 PROCEDURE — 93010 ELECTROCARDIOGRAM REPORT: CPT | Performed by: INTERNAL MEDICINE

## 2019-11-16 PROCEDURE — 93005 ELECTROCARDIOGRAM TRACING: CPT | Performed by: INTERNAL MEDICINE

## 2019-11-16 PROCEDURE — 84145 PROCALCITONIN (PCT): CPT | Performed by: INTERNAL MEDICINE

## 2019-11-16 PROCEDURE — 82962 GLUCOSE BLOOD TEST: CPT

## 2019-11-16 PROCEDURE — 84484 ASSAY OF TROPONIN QUANT: CPT | Performed by: INTERNAL MEDICINE

## 2019-11-16 RX ORDER — ALUMINA, MAGNESIA, AND SIMETHICONE 2400; 2400; 240 MG/30ML; MG/30ML; MG/30ML
15 SUSPENSION ORAL EVERY 6 HOURS PRN
Status: DISCONTINUED | OUTPATIENT
Start: 2019-11-16 | End: 2019-12-01 | Stop reason: HOSPADM

## 2019-11-16 RX ORDER — ATORVASTATIN CALCIUM 80 MG/1
80 TABLET, FILM COATED ORAL NIGHTLY
Status: DISCONTINUED | OUTPATIENT
Start: 2019-11-16 | End: 2019-12-01 | Stop reason: HOSPADM

## 2019-11-16 RX ORDER — CLOPIDOGREL BISULFATE 75 MG/1
75 TABLET ORAL DAILY
Status: DISCONTINUED | OUTPATIENT
Start: 2019-11-17 | End: 2019-12-01 | Stop reason: HOSPADM

## 2019-11-16 RX ORDER — DULOXETIN HYDROCHLORIDE 60 MG/1
60 CAPSULE, DELAYED RELEASE ORAL NIGHTLY
Status: DISCONTINUED | OUTPATIENT
Start: 2019-11-16 | End: 2019-12-01 | Stop reason: HOSPADM

## 2019-11-16 RX ORDER — BISACODYL 5 MG/1
5 TABLET, DELAYED RELEASE ORAL DAILY PRN
Status: DISCONTINUED | OUTPATIENT
Start: 2019-11-16 | End: 2019-12-01 | Stop reason: HOSPADM

## 2019-11-16 RX ORDER — NITROGLYCERIN 20 MG/100ML
10 INJECTION INTRAVENOUS
Status: DISCONTINUED | OUTPATIENT
Start: 2019-11-17 | End: 2019-11-18

## 2019-11-16 RX ORDER — ACETAMINOPHEN 650 MG/1
650 SUPPOSITORY RECTAL EVERY 4 HOURS PRN
Status: DISCONTINUED | OUTPATIENT
Start: 2019-11-16 | End: 2019-12-01 | Stop reason: HOSPADM

## 2019-11-16 RX ORDER — IPRATROPIUM BROMIDE AND ALBUTEROL SULFATE 2.5; .5 MG/3ML; MG/3ML
3 SOLUTION RESPIRATORY (INHALATION) EVERY 6 HOURS PRN
Status: DISCONTINUED | OUTPATIENT
Start: 2019-11-16 | End: 2019-11-23

## 2019-11-16 RX ORDER — DEXTROSE MONOHYDRATE 25 G/50ML
25 INJECTION, SOLUTION INTRAVENOUS
Status: DISCONTINUED | OUTPATIENT
Start: 2019-11-16 | End: 2019-12-01 | Stop reason: HOSPADM

## 2019-11-16 RX ORDER — GABAPENTIN 400 MG/1
400 CAPSULE ORAL 3 TIMES DAILY
Status: DISCONTINUED | OUTPATIENT
Start: 2019-11-16 | End: 2019-12-01 | Stop reason: HOSPADM

## 2019-11-16 RX ORDER — ACETAMINOPHEN 325 MG/1
650 TABLET ORAL EVERY 4 HOURS PRN
Status: DISCONTINUED | OUTPATIENT
Start: 2019-11-16 | End: 2019-12-01 | Stop reason: HOSPADM

## 2019-11-16 RX ORDER — ONDANSETRON 4 MG/1
4 TABLET, FILM COATED ORAL EVERY 6 HOURS PRN
Status: DISCONTINUED | OUTPATIENT
Start: 2019-11-16 | End: 2019-12-01 | Stop reason: HOSPADM

## 2019-11-16 RX ORDER — NICOTINE POLACRILEX 4 MG
15 LOZENGE BUCCAL
Status: DISCONTINUED | OUTPATIENT
Start: 2019-11-16 | End: 2019-12-01 | Stop reason: HOSPADM

## 2019-11-16 RX ORDER — ONDANSETRON 2 MG/ML
4 INJECTION INTRAMUSCULAR; INTRAVENOUS EVERY 6 HOURS PRN
Status: DISCONTINUED | OUTPATIENT
Start: 2019-11-16 | End: 2019-12-01 | Stop reason: HOSPADM

## 2019-11-16 RX ORDER — FAMOTIDINE 20 MG/1
20 TABLET, FILM COATED ORAL
Status: DISCONTINUED | OUTPATIENT
Start: 2019-11-17 | End: 2019-12-01 | Stop reason: HOSPADM

## 2019-11-16 RX ORDER — ASPIRIN 325 MG
325 TABLET ORAL DAILY
Status: DISCONTINUED | OUTPATIENT
Start: 2019-11-17 | End: 2019-11-17

## 2019-11-16 RX ORDER — BISACODYL 10 MG
10 SUPPOSITORY, RECTAL RECTAL DAILY PRN
Status: DISCONTINUED | OUTPATIENT
Start: 2019-11-16 | End: 2019-12-01 | Stop reason: HOSPADM

## 2019-11-16 RX ADMIN — NITROGLYCERIN 10 MCG/MIN: 20 INJECTION INTRAVENOUS at 23:55

## 2019-11-16 RX ADMIN — GABAPENTIN 400 MG: 400 CAPSULE ORAL at 23:33

## 2019-11-16 RX ADMIN — ENOXAPARIN SODIUM 40 MG: 40 INJECTION SUBCUTANEOUS at 23:55

## 2019-11-17 ENCOUNTER — APPOINTMENT (OUTPATIENT)
Dept: CARDIOLOGY | Facility: HOSPITAL | Age: 64
End: 2019-11-17

## 2019-11-17 LAB
ALBUMIN SERPL-MCNC: 2.9 G/DL (ref 3.5–5.2)
ALBUMIN/GLOB SERPL: 0.8 G/DL
ALP SERPL-CCNC: 94 U/L (ref 39–117)
ALT SERPL W P-5'-P-CCNC: 70 U/L (ref 1–41)
ANION GAP SERPL CALCULATED.3IONS-SCNC: 12.3 MMOL/L (ref 5–15)
AORTIC DIMENSIONLESS INDEX: 0.8 (DI)
APTT PPP: 26.1 SECONDS (ref 22.7–35.4)
APTT PPP: 36.7 SECONDS (ref 22.7–35.4)
AST SERPL-CCNC: 66 U/L (ref 1–40)
BASOPHILS # BLD AUTO: 0.02 10*3/MM3 (ref 0–0.2)
BASOPHILS # BLD AUTO: 0.02 10*3/MM3 (ref 0–0.2)
BASOPHILS NFR BLD AUTO: 0.2 % (ref 0–1.5)
BASOPHILS NFR BLD AUTO: 0.2 % (ref 0–1.5)
BH CV ECHO MEAS - ACS: 1.8 CM
BH CV ECHO MEAS - AO MAX PG: 5 MMHG
BH CV ECHO MEAS - AO MEAN PG (FULL): 0 MMHG
BH CV ECHO MEAS - AO MEAN PG: 2 MMHG
BH CV ECHO MEAS - AO ROOT AREA (BSA CORRECTED): 1.6
BH CV ECHO MEAS - AO ROOT AREA: 8 CM^2
BH CV ECHO MEAS - AO ROOT DIAM: 3.2 CM
BH CV ECHO MEAS - AO V2 MAX: 112 CM/SEC
BH CV ECHO MEAS - AO V2 MEAN: 70.7 CM/SEC
BH CV ECHO MEAS - AO V2 VTI: 17.4 CM
BH CV ECHO MEAS - AVA(I,A): 3.2 CM^2
BH CV ECHO MEAS - AVA(I,D): 3.2 CM^2
BH CV ECHO MEAS - BSA(HAYCOCK): 2 M^2
BH CV ECHO MEAS - BSA: 2 M^2
BH CV ECHO MEAS - BZI_BMI: 27.6 KILOGRAMS/M^2
BH CV ECHO MEAS - BZI_METRIC_HEIGHT: 175.3 CM
BH CV ECHO MEAS - BZI_METRIC_WEIGHT: 84.8 KG
BH CV ECHO MEAS - EDV(CUBED): 140.6 ML
BH CV ECHO MEAS - EDV(MOD-SP2): 112 ML
BH CV ECHO MEAS - EDV(MOD-SP4): 128 ML
BH CV ECHO MEAS - EDV(TEICH): 129.5 ML
BH CV ECHO MEAS - EF(CUBED): 61 %
BH CV ECHO MEAS - EF(MOD-BP): 40 %
BH CV ECHO MEAS - EF(MOD-SP2): 41.1 %
BH CV ECHO MEAS - EF(MOD-SP4): 44.5 %
BH CV ECHO MEAS - EF(TEICH): 52.2 %
BH CV ECHO MEAS - ESV(CUBED): 54.9 ML
BH CV ECHO MEAS - ESV(MOD-SP2): 66 ML
BH CV ECHO MEAS - ESV(MOD-SP4): 71 ML
BH CV ECHO MEAS - ESV(TEICH): 62 ML
BH CV ECHO MEAS - FS: 26.9 %
BH CV ECHO MEAS - IVS/LVPW: 1.2
BH CV ECHO MEAS - IVSD: 1.2 CM
BH CV ECHO MEAS - LA DIMENSION: 4 CM
BH CV ECHO MEAS - LA/AO: 1.3
BH CV ECHO MEAS - LAT PEAK E' VEL: 9 CM/SEC
BH CV ECHO MEAS - LV DIASTOLIC VOL/BSA (35-75): 63.8 ML/M^2
BH CV ECHO MEAS - LV MASS(C)D: 220.8 GRAMS
BH CV ECHO MEAS - LV MASS(C)DI: 110 GRAMS/M^2
BH CV ECHO MEAS - LV MAX PG: 3 MMHG
BH CV ECHO MEAS - LV MEAN PG: 2 MMHG
BH CV ECHO MEAS - LV SYSTOLIC VOL/BSA (12-30): 35.4 ML/M^2
BH CV ECHO MEAS - LV V1 MAX: 89 CM/SEC
BH CV ECHO MEAS - LV V1 MEAN: 59.3 CM/SEC
BH CV ECHO MEAS - LV V1 VTI: 14.5 CM
BH CV ECHO MEAS - LVIDD: 5.2 CM
BH CV ECHO MEAS - LVIDS: 3.8 CM
BH CV ECHO MEAS - LVLD AP2: 8.3 CM
BH CV ECHO MEAS - LVLD AP4: 8.1 CM
BH CV ECHO MEAS - LVLS AP2: 7.7 CM
BH CV ECHO MEAS - LVLS AP4: 7.1 CM
BH CV ECHO MEAS - LVOT AREA (M): 3.8 CM^2
BH CV ECHO MEAS - LVOT AREA: 3.8 CM^2
BH CV ECHO MEAS - LVOT DIAM: 2.2 CM
BH CV ECHO MEAS - LVPWD: 1 CM
BH CV ECHO MEAS - MED PEAK E' VEL: 8 CM/SEC
BH CV ECHO MEAS - MR MAX PG: 85.4 MMHG
BH CV ECHO MEAS - MR MAX VEL: 462 CM/SEC
BH CV ECHO MEAS - MV DEC SLOPE: 544 CM/SEC^2
BH CV ECHO MEAS - MV DEC TIME: 140 SEC
BH CV ECHO MEAS - MV E MAX VEL: 103 CM/SEC
BH CV ECHO MEAS - MV MAX PG: 5 MMHG
BH CV ECHO MEAS - MV MEAN PG: 2 MMHG
BH CV ECHO MEAS - MV P1/2T MAX VEL: 110 CM/SEC
BH CV ECHO MEAS - MV P1/2T: 59.2 MSEC
BH CV ECHO MEAS - MV V2 MEAN: 64.1 CM/SEC
BH CV ECHO MEAS - MV V2 VTI: 21.3 CM
BH CV ECHO MEAS - MVA P1/2T LCG: 2 CM^2
BH CV ECHO MEAS - MVA(P1/2T): 3.7 CM^2
BH CV ECHO MEAS - MVA(VTI): 2.6 CM^2
BH CV ECHO MEAS - PA ACC SLOPE: 1210 CM/SEC^2
BH CV ECHO MEAS - PA ACC TIME: 0.09 SEC
BH CV ECHO MEAS - PA MAX PG (FULL): 2.2 MMHG
BH CV ECHO MEAS - PA MAX PG: 4.8 MMHG
BH CV ECHO MEAS - PA PR(ACCEL): 39.9 MMHG
BH CV ECHO MEAS - PA V2 MAX: 109 CM/SEC
BH CV ECHO MEAS - PULM A REVS DUR: 0.1 SEC
BH CV ECHO MEAS - PULM A REVS VEL: 32.5 CM/SEC
BH CV ECHO MEAS - PULM DIAS VEL: 54.1 CM/SEC
BH CV ECHO MEAS - PULM S/D: 0.58
BH CV ECHO MEAS - PULM SYS VEL: 31.6 CM/SEC
BH CV ECHO MEAS - PVA(V,A): 2.5 CM^2
BH CV ECHO MEAS - PVA(V,D): 2.5 CM^2
BH CV ECHO MEAS - QP/QS: 0.75
BH CV ECHO MEAS - RAP SYSTOLE: 3 MMHG
BH CV ECHO MEAS - RV MAX PG: 2.5 MMHG
BH CV ECHO MEAS - RV MEAN PG: 1 MMHG
BH CV ECHO MEAS - RV V1 MAX: 79.7 CM/SEC
BH CV ECHO MEAS - RV V1 MEAN: 52.8 CM/SEC
BH CV ECHO MEAS - RV V1 VTI: 12 CM
BH CV ECHO MEAS - RVOT AREA: 3.5 CM^2
BH CV ECHO MEAS - RVOT DIAM: 2.1 CM
BH CV ECHO MEAS - RVSP: 32.4 MMHG
BH CV ECHO MEAS - SI(AO): 69.7 ML/M^2
BH CV ECHO MEAS - SI(CUBED): 42.7 ML/M^2
BH CV ECHO MEAS - SI(LVOT): 27.5 ML/M^2
BH CV ECHO MEAS - SI(MOD-SP2): 22.9 ML/M^2
BH CV ECHO MEAS - SI(MOD-SP4): 28.4 ML/M^2
BH CV ECHO MEAS - SI(TEICH): 33.7 ML/M^2
BH CV ECHO MEAS - SV(AO): 139.9 ML
BH CV ECHO MEAS - SV(CUBED): 85.7 ML
BH CV ECHO MEAS - SV(LVOT): 55.1 ML
BH CV ECHO MEAS - SV(MOD-SP2): 46 ML
BH CV ECHO MEAS - SV(MOD-SP4): 57 ML
BH CV ECHO MEAS - SV(RVOT): 41.6 ML
BH CV ECHO MEAS - SV(TEICH): 67.6 ML
BH CV ECHO MEAS - TAPSE (>1.6): 1.5 CM2
BH CV ECHO MEAS - TR MAX VEL: 271 CM/SEC
BH CV ECHO MEASUREMENTS AVERAGE E/E' RATIO: 12.12
BH CV VAS BP LEFT ARM: NORMAL MMHG
BH CV XLRA - RV BASE: 3 CM
BH CV XLRA - TDI S': 12 CM/SEC
BILIRUB SERPL-MCNC: 0.8 MG/DL (ref 0.2–1.2)
BUN BLD-MCNC: 27 MG/DL (ref 8–23)
BUN/CREAT SERPL: 24.8 (ref 7–25)
CALCIUM SPEC-SCNC: 8.1 MG/DL (ref 8.6–10.5)
CHLORIDE SERPL-SCNC: 95 MMOL/L (ref 98–107)
CO2 SERPL-SCNC: 30.7 MMOL/L (ref 22–29)
CREAT BLD-MCNC: 1.09 MG/DL (ref 0.76–1.27)
D-LACTATE SERPL-SCNC: 1.8 MMOL/L (ref 0.5–2)
DEPRECATED RDW RBC AUTO: 44.3 FL (ref 37–54)
DEPRECATED RDW RBC AUTO: 46.6 FL (ref 37–54)
EOSINOPHIL # BLD AUTO: 0.04 10*3/MM3 (ref 0–0.4)
EOSINOPHIL # BLD AUTO: 0.04 10*3/MM3 (ref 0–0.4)
EOSINOPHIL NFR BLD AUTO: 0.4 % (ref 0.3–6.2)
EOSINOPHIL NFR BLD AUTO: 0.4 % (ref 0.3–6.2)
ERYTHROCYTE [DISTWIDTH] IN BLOOD BY AUTOMATED COUNT: 13.1 % (ref 12.3–15.4)
ERYTHROCYTE [DISTWIDTH] IN BLOOD BY AUTOMATED COUNT: 13.7 % (ref 12.3–15.4)
GFR SERPL CREATININE-BSD FRML MDRD: 68 ML/MIN/1.73
GLOBULIN UR ELPH-MCNC: 3.7 GM/DL
GLUCOSE BLD-MCNC: 273 MG/DL (ref 65–99)
GLUCOSE BLDC GLUCOMTR-MCNC: 170 MG/DL (ref 70–130)
GLUCOSE BLDC GLUCOMTR-MCNC: 231 MG/DL (ref 70–130)
GLUCOSE BLDC GLUCOMTR-MCNC: 297 MG/DL (ref 70–130)
GLUCOSE BLDC GLUCOMTR-MCNC: 327 MG/DL (ref 70–130)
GLUCOSE BLDC GLUCOMTR-MCNC: 79 MG/DL (ref 70–130)
HCT VFR BLD AUTO: 35.2 % (ref 37.5–51)
HCT VFR BLD AUTO: 40.1 % (ref 37.5–51)
HGB BLD-MCNC: 12.1 G/DL (ref 13–17.7)
HGB BLD-MCNC: 13.3 G/DL (ref 13–17.7)
HOLD SPECIMEN: NORMAL
IMM GRANULOCYTES # BLD AUTO: 0.05 10*3/MM3 (ref 0–0.05)
IMM GRANULOCYTES # BLD AUTO: 0.09 10*3/MM3 (ref 0–0.05)
IMM GRANULOCYTES NFR BLD AUTO: 0.5 % (ref 0–0.5)
IMM GRANULOCYTES NFR BLD AUTO: 0.9 % (ref 0–0.5)
INR PPP: 1.12 (ref 0.9–1.1)
LEFT ATRIUM VOLUME INDEX: 33 ML/M2
LEFT ATRIUM VOLUME: 66 CM3
LYMPHOCYTES # BLD AUTO: 0.92 10*3/MM3 (ref 0.7–3.1)
LYMPHOCYTES # BLD AUTO: 1.29 10*3/MM3 (ref 0.7–3.1)
LYMPHOCYTES NFR BLD AUTO: 13.1 % (ref 19.6–45.3)
LYMPHOCYTES NFR BLD AUTO: 8.7 % (ref 19.6–45.3)
MAXIMAL PREDICTED HEART RATE: 155 BPM
MCH RBC QN AUTO: 30.9 PG (ref 26.6–33)
MCH RBC QN AUTO: 32.3 PG (ref 26.6–33)
MCHC RBC AUTO-ENTMCNC: 33.2 G/DL (ref 31.5–35.7)
MCHC RBC AUTO-ENTMCNC: 34.4 G/DL (ref 31.5–35.7)
MCV RBC AUTO: 93 FL (ref 79–97)
MCV RBC AUTO: 93.9 FL (ref 79–97)
MONOCYTES # BLD AUTO: 0.63 10*3/MM3 (ref 0.1–0.9)
MONOCYTES # BLD AUTO: 0.66 10*3/MM3 (ref 0.1–0.9)
MONOCYTES NFR BLD AUTO: 6.2 % (ref 5–12)
MONOCYTES NFR BLD AUTO: 6.4 % (ref 5–12)
NEUTROPHILS # BLD AUTO: 7.77 10*3/MM3 (ref 1.7–7)
NEUTROPHILS # BLD AUTO: 8.93 10*3/MM3 (ref 1.7–7)
NEUTROPHILS NFR BLD AUTO: 79 % (ref 42.7–76)
NEUTROPHILS NFR BLD AUTO: 84 % (ref 42.7–76)
NRBC BLD AUTO-RTO: 0.1 /100 WBC (ref 0–0.2)
NRBC BLD AUTO-RTO: 0.2 /100 WBC (ref 0–0.2)
PLATELET # BLD AUTO: 154 10*3/MM3 (ref 140–450)
PLATELET # BLD AUTO: 165 10*3/MM3 (ref 140–450)
PMV BLD AUTO: 10.4 FL (ref 6–12)
PMV BLD AUTO: 10.8 FL (ref 6–12)
POTASSIUM BLD-SCNC: 3.4 MMOL/L (ref 3.5–5.2)
PROCALCITONIN SERPL-MCNC: 0.2 NG/ML (ref 0.1–0.25)
PROT SERPL-MCNC: 6.6 G/DL (ref 6–8.5)
PROTHROMBIN TIME: 14.1 SECONDS (ref 11.7–14.2)
RBC # BLD AUTO: 3.75 10*6/MM3 (ref 4.14–5.8)
RBC # BLD AUTO: 4.31 10*6/MM3 (ref 4.14–5.8)
SODIUM BLD-SCNC: 138 MMOL/L (ref 136–145)
STRESS TARGET HR: 132 BPM
TROPONIN T SERPL-MCNC: 0.84 NG/ML (ref 0–0.03)
WBC NRBC COR # BLD: 10.62 10*3/MM3 (ref 3.4–10.8)
WBC NRBC COR # BLD: 9.84 10*3/MM3 (ref 3.4–10.8)

## 2019-11-17 PROCEDURE — 63710000001 INSULIN REGULAR HUMAN PER 5 UNITS: Performed by: INTERNAL MEDICINE

## 2019-11-17 PROCEDURE — 63710000001 INSULIN LISPRO (HUMAN) PER 5 UNITS: Performed by: INTERNAL MEDICINE

## 2019-11-17 PROCEDURE — 93306 TTE W/DOPPLER COMPLETE: CPT | Performed by: INTERNAL MEDICINE

## 2019-11-17 PROCEDURE — 93005 ELECTROCARDIOGRAM TRACING: CPT | Performed by: INTERNAL MEDICINE

## 2019-11-17 PROCEDURE — 83605 ASSAY OF LACTIC ACID: CPT | Performed by: INTERNAL MEDICINE

## 2019-11-17 PROCEDURE — 93306 TTE W/DOPPLER COMPLETE: CPT

## 2019-11-17 PROCEDURE — 85025 COMPLETE CBC W/AUTO DIFF WBC: CPT | Performed by: INTERNAL MEDICINE

## 2019-11-17 PROCEDURE — 99223 1ST HOSP IP/OBS HIGH 75: CPT | Performed by: INTERNAL MEDICINE

## 2019-11-17 PROCEDURE — 84484 ASSAY OF TROPONIN QUANT: CPT | Performed by: INTERNAL MEDICINE

## 2019-11-17 PROCEDURE — 93010 ELECTROCARDIOGRAM REPORT: CPT | Performed by: INTERNAL MEDICINE

## 2019-11-17 PROCEDURE — 25010000002 FUROSEMIDE PER 20 MG: Performed by: INTERNAL MEDICINE

## 2019-11-17 PROCEDURE — 80053 COMPREHEN METABOLIC PANEL: CPT | Performed by: INTERNAL MEDICINE

## 2019-11-17 PROCEDURE — 25010000002 HEPARIN (PORCINE) PER 1000 UNITS: Performed by: INTERNAL MEDICINE

## 2019-11-17 PROCEDURE — 85610 PROTHROMBIN TIME: CPT | Performed by: INTERNAL MEDICINE

## 2019-11-17 PROCEDURE — 85730 THROMBOPLASTIN TIME PARTIAL: CPT | Performed by: INTERNAL MEDICINE

## 2019-11-17 PROCEDURE — 99233 SBSQ HOSP IP/OBS HIGH 50: CPT | Performed by: INTERNAL MEDICINE

## 2019-11-17 PROCEDURE — 25010000002 PERFLUTREN (DEFINITY) 8.476 MG IN SODIUM CHLORIDE 0.9 % 10 ML INJECTION: Performed by: INTERNAL MEDICINE

## 2019-11-17 PROCEDURE — 82962 GLUCOSE BLOOD TEST: CPT

## 2019-11-17 RX ORDER — HEPARIN SODIUM 5000 [USP'U]/ML
47.1 INJECTION, SOLUTION INTRAVENOUS; SUBCUTANEOUS ONCE
Status: COMPLETED | OUTPATIENT
Start: 2019-11-17 | End: 2019-11-17

## 2019-11-17 RX ORDER — NICOTINE POLACRILEX 4 MG
15 LOZENGE BUCCAL
Status: DISCONTINUED | OUTPATIENT
Start: 2019-11-17 | End: 2019-12-01 | Stop reason: HOSPADM

## 2019-11-17 RX ORDER — HEPARIN SODIUM 10000 [USP'U]/100ML
12 INJECTION, SOLUTION INTRAVENOUS
Status: DISCONTINUED | OUTPATIENT
Start: 2019-11-17 | End: 2019-11-18

## 2019-11-17 RX ORDER — DEXTROSE MONOHYDRATE 25 G/50ML
25 INJECTION, SOLUTION INTRAVENOUS
Status: DISCONTINUED | OUTPATIENT
Start: 2019-11-17 | End: 2019-12-01 | Stop reason: HOSPADM

## 2019-11-17 RX ORDER — HEPARIN SODIUM 10000 [USP'U]/100ML
11 INJECTION, SOLUTION INTRAVENOUS
Status: DISCONTINUED | OUTPATIENT
Start: 2019-11-17 | End: 2019-11-17

## 2019-11-17 RX ORDER — ASPIRIN 81 MG/1
81 TABLET ORAL DAILY
Status: DISCONTINUED | OUTPATIENT
Start: 2019-11-18 | End: 2019-12-01 | Stop reason: HOSPADM

## 2019-11-17 RX ORDER — SODIUM CHLORIDE 9 MG/ML
100 INJECTION, SOLUTION INTRAVENOUS CONTINUOUS
Status: DISCONTINUED | OUTPATIENT
Start: 2019-11-17 | End: 2019-11-18

## 2019-11-17 RX ORDER — HEPARIN SODIUM 5000 [USP'U]/ML
30-47.1 INJECTION, SOLUTION INTRAVENOUS; SUBCUTANEOUS EVERY 6 HOURS PRN
Status: DISCONTINUED | OUTPATIENT
Start: 2019-11-17 | End: 2019-11-18

## 2019-11-17 RX ORDER — FUROSEMIDE 10 MG/ML
20 INJECTION INTRAMUSCULAR; INTRAVENOUS ONCE
Status: COMPLETED | OUTPATIENT
Start: 2019-11-17 | End: 2019-11-17

## 2019-11-17 RX ADMIN — GABAPENTIN 400 MG: 400 CAPSULE ORAL at 20:01

## 2019-11-17 RX ADMIN — INSULIN LISPRO 2 UNITS: 100 INJECTION, SOLUTION INTRAVENOUS; SUBCUTANEOUS at 08:18

## 2019-11-17 RX ADMIN — FAMOTIDINE 20 MG: 20 TABLET, FILM COATED ORAL at 17:34

## 2019-11-17 RX ADMIN — INSULIN LISPRO 3 UNITS: 100 INJECTION, SOLUTION INTRAVENOUS; SUBCUTANEOUS at 17:34

## 2019-11-17 RX ADMIN — ATORVASTATIN CALCIUM 80 MG: 80 TABLET, FILM COATED ORAL at 01:55

## 2019-11-17 RX ADMIN — GABAPENTIN 400 MG: 400 CAPSULE ORAL at 08:17

## 2019-11-17 RX ADMIN — HEPARIN SODIUM 12 UNITS/KG/HR: 10000 INJECTION, SOLUTION INTRAVENOUS at 12:25

## 2019-11-17 RX ADMIN — METFORMIN HYDROCHLORIDE 500 MG: 500 TABLET ORAL at 17:34

## 2019-11-17 RX ADMIN — PERFLUTREN 2 ML: 6.52 INJECTION, SUSPENSION INTRAVENOUS at 13:45

## 2019-11-17 RX ADMIN — ATORVASTATIN CALCIUM 80 MG: 80 TABLET, FILM COATED ORAL at 20:01

## 2019-11-17 RX ADMIN — DULOXETINE HYDROCHLORIDE 60 MG: 60 CAPSULE, DELAYED RELEASE ORAL at 01:56

## 2019-11-17 RX ADMIN — FUROSEMIDE 20 MG: 20 INJECTION, SOLUTION INTRAMUSCULAR; INTRAVENOUS at 11:32

## 2019-11-17 RX ADMIN — CLOPIDOGREL 75 MG: 75 TABLET, FILM COATED ORAL at 08:17

## 2019-11-17 RX ADMIN — ASPIRIN 325 MG: 325 TABLET ORAL at 08:17

## 2019-11-17 RX ADMIN — SODIUM CHLORIDE 100 ML/HR: 9 INJECTION, SOLUTION INTRAVENOUS at 18:28

## 2019-11-17 RX ADMIN — FAMOTIDINE 20 MG: 20 TABLET, FILM COATED ORAL at 07:10

## 2019-11-17 RX ADMIN — INSULIN LISPRO 5 UNITS: 100 INJECTION, SOLUTION INTRAVENOUS; SUBCUTANEOUS at 20:01

## 2019-11-17 RX ADMIN — DULOXETINE HYDROCHLORIDE 60 MG: 60 CAPSULE, DELAYED RELEASE ORAL at 20:01

## 2019-11-17 RX ADMIN — GABAPENTIN 400 MG: 400 CAPSULE ORAL at 17:34

## 2019-11-17 RX ADMIN — INSULIN HUMAN 24 UNITS: 100 INJECTION, SOLUTION PARENTERAL at 00:29

## 2019-11-17 RX ADMIN — HEPARIN SODIUM 4000 UNITS: 5000 INJECTION INTRAVENOUS; SUBCUTANEOUS at 12:24

## 2019-11-17 RX ADMIN — INSULIN HUMAN 12 UNITS: 100 INJECTION, SOLUTION PARENTERAL at 06:08

## 2019-11-17 RX ADMIN — HEPARIN SODIUM 4000 UNITS: 5000 INJECTION INTRAVENOUS; SUBCUTANEOUS at 21:50

## 2019-11-18 ENCOUNTER — APPOINTMENT (OUTPATIENT)
Dept: ULTRASOUND IMAGING | Facility: HOSPITAL | Age: 64
End: 2019-11-18

## 2019-11-18 ENCOUNTER — APPOINTMENT (OUTPATIENT)
Dept: GENERAL RADIOLOGY | Facility: HOSPITAL | Age: 64
End: 2019-11-18

## 2019-11-18 LAB
ALBUMIN SERPL-MCNC: 3 G/DL (ref 3.5–5.2)
ALP SERPL-CCNC: 91 U/L (ref 39–117)
ALT SERPL W P-5'-P-CCNC: 80 U/L (ref 1–41)
ANION GAP SERPL CALCULATED.3IONS-SCNC: 12.6 MMOL/L (ref 5–15)
APTT PPP: 56.9 SECONDS (ref 22.7–35.4)
APTT PPP: 59.3 SECONDS (ref 22.7–35.4)
AST SERPL-CCNC: 56 U/L (ref 1–40)
BACTERIA UR QL AUTO: ABNORMAL /HPF
BASOPHILS # BLD AUTO: 0.03 10*3/MM3 (ref 0–0.2)
BASOPHILS NFR BLD AUTO: 0.3 % (ref 0–1.5)
BILIRUB CONJ SERPL-MCNC: 0.2 MG/DL (ref 0.2–0.3)
BILIRUB INDIRECT SERPL-MCNC: 0.4 MG/DL
BILIRUB SERPL-MCNC: 0.6 MG/DL (ref 0.2–1.2)
BILIRUB UR QL STRIP: NEGATIVE
BUN BLD-MCNC: 21 MG/DL (ref 8–23)
BUN/CREAT SERPL: 22.6 (ref 7–25)
CALCIUM SPEC-SCNC: 7.5 MG/DL (ref 8.6–10.5)
CHLORIDE SERPL-SCNC: 96 MMOL/L (ref 98–107)
CLARITY UR: CLEAR
CO2 SERPL-SCNC: 26.4 MMOL/L (ref 22–29)
COLOR UR: YELLOW
CREAT BLD-MCNC: 0.93 MG/DL (ref 0.76–1.27)
DEPRECATED RDW RBC AUTO: 45.5 FL (ref 37–54)
EOSINOPHIL # BLD AUTO: 0.14 10*3/MM3 (ref 0–0.4)
EOSINOPHIL NFR BLD AUTO: 1.5 % (ref 0.3–6.2)
ERYTHROCYTE [DISTWIDTH] IN BLOOD BY AUTOMATED COUNT: 13.1 % (ref 12.3–15.4)
GFR SERPL CREATININE-BSD FRML MDRD: 82 ML/MIN/1.73
GLUCOSE BLD-MCNC: 262 MG/DL (ref 65–99)
GLUCOSE BLDC GLUCOMTR-MCNC: 178 MG/DL (ref 70–130)
GLUCOSE BLDC GLUCOMTR-MCNC: 191 MG/DL (ref 70–130)
GLUCOSE BLDC GLUCOMTR-MCNC: 202 MG/DL (ref 70–130)
GLUCOSE BLDC GLUCOMTR-MCNC: 221 MG/DL (ref 70–130)
GLUCOSE BLDC GLUCOMTR-MCNC: 256 MG/DL (ref 70–130)
GLUCOSE UR STRIP-MCNC: ABNORMAL MG/DL
HAV IGM SERPL QL IA: NORMAL
HBV CORE IGM SERPL QL IA: NORMAL
HBV SURFACE AG SERPL QL IA: NORMAL
HCT VFR BLD AUTO: 35.6 % (ref 37.5–51)
HCV AB SER DONR QL: NORMAL
HGB BLD-MCNC: 11.9 G/DL (ref 13–17.7)
HGB UR QL STRIP.AUTO: ABNORMAL
HYALINE CASTS UR QL AUTO: ABNORMAL /LPF
IMM GRANULOCYTES # BLD AUTO: 0.11 10*3/MM3 (ref 0–0.05)
IMM GRANULOCYTES NFR BLD AUTO: 1.2 % (ref 0–0.5)
KETONES UR QL STRIP: ABNORMAL
LEUKOCYTE ESTERASE UR QL STRIP.AUTO: NEGATIVE
LYMPHOCYTES # BLD AUTO: 1.02 10*3/MM3 (ref 0.7–3.1)
LYMPHOCYTES NFR BLD AUTO: 10.8 % (ref 19.6–45.3)
MCH RBC QN AUTO: 31.8 PG (ref 26.6–33)
MCHC RBC AUTO-ENTMCNC: 33.4 G/DL (ref 31.5–35.7)
MCV RBC AUTO: 95.2 FL (ref 79–97)
MONOCYTES # BLD AUTO: 0.64 10*3/MM3 (ref 0.1–0.9)
MONOCYTES NFR BLD AUTO: 6.8 % (ref 5–12)
NEUTROPHILS # BLD AUTO: 7.5 10*3/MM3 (ref 1.7–7)
NEUTROPHILS NFR BLD AUTO: 79.4 % (ref 42.7–76)
NITRITE UR QL STRIP: NEGATIVE
NRBC BLD AUTO-RTO: 0 /100 WBC (ref 0–0.2)
PH UR STRIP.AUTO: 5.5 [PH] (ref 5–8)
PLATELET # BLD AUTO: 166 10*3/MM3 (ref 140–450)
PMV BLD AUTO: 10.5 FL (ref 6–12)
POTASSIUM BLD-SCNC: 3.6 MMOL/L (ref 3.5–5.2)
PROT SERPL-MCNC: 6.7 G/DL (ref 6–8.5)
PROT UR QL STRIP: ABNORMAL
RBC # BLD AUTO: 3.74 10*6/MM3 (ref 4.14–5.8)
RBC # UR: ABNORMAL /HPF
REF LAB TEST METHOD: ABNORMAL
SODIUM BLD-SCNC: 135 MMOL/L (ref 136–145)
SP GR UR STRIP: 1.02 (ref 1–1.03)
SQUAMOUS #/AREA URNS HPF: ABNORMAL /HPF
UROBILINOGEN UR QL STRIP: ABNORMAL
WBC NRBC COR # BLD: 9.44 10*3/MM3 (ref 3.4–10.8)
WBC UR QL AUTO: ABNORMAL /HPF

## 2019-11-18 PROCEDURE — C1725 CATH, TRANSLUMIN NON-LASER: HCPCS | Performed by: INTERNAL MEDICINE

## 2019-11-18 PROCEDURE — 0 IOPAMIDOL PER 1 ML: Performed by: INTERNAL MEDICINE

## 2019-11-18 PROCEDURE — 85730 THROMBOPLASTIN TIME PARTIAL: CPT | Performed by: INTERNAL MEDICINE

## 2019-11-18 PROCEDURE — 99222 1ST HOSP IP/OBS MODERATE 55: CPT | Performed by: INTERNAL MEDICINE

## 2019-11-18 PROCEDURE — 93005 ELECTROCARDIOGRAM TRACING: CPT | Performed by: INTERNAL MEDICINE

## 2019-11-18 PROCEDURE — C1874 STENT, COATED/COV W/DEL SYS: HCPCS | Performed by: INTERNAL MEDICINE

## 2019-11-18 PROCEDURE — 51798 US URINE CAPACITY MEASURE: CPT

## 2019-11-18 PROCEDURE — 027035Z DILATION OF CORONARY ARTERY, ONE ARTERY WITH TWO DRUG-ELUTING INTRALUMINAL DEVICES, PERCUTANEOUS APPROACH: ICD-10-PCS | Performed by: INTERNAL MEDICINE

## 2019-11-18 PROCEDURE — 63710000001 INSULIN LISPRO (HUMAN) PER 5 UNITS: Performed by: INTERNAL MEDICINE

## 2019-11-18 PROCEDURE — B2011ZZ PLAIN RADIOGRAPHY OF MULTIPLE CORONARY ARTERIES USING LOW OSMOLAR CONTRAST: ICD-10-PCS | Performed by: INTERNAL MEDICINE

## 2019-11-18 PROCEDURE — 71045 X-RAY EXAM CHEST 1 VIEW: CPT

## 2019-11-18 PROCEDURE — 81001 URINALYSIS AUTO W/SCOPE: CPT | Performed by: INTERNAL MEDICINE

## 2019-11-18 PROCEDURE — 4A023N7 MEASUREMENT OF CARDIAC SAMPLING AND PRESSURE, LEFT HEART, PERCUTANEOUS APPROACH: ICD-10-PCS | Performed by: INTERNAL MEDICINE

## 2019-11-18 PROCEDURE — 99232 SBSQ HOSP IP/OBS MODERATE 35: CPT | Performed by: INTERNAL MEDICINE

## 2019-11-18 PROCEDURE — 93459 L HRT ART/GRFT ANGIO: CPT | Performed by: INTERNAL MEDICINE

## 2019-11-18 PROCEDURE — 82962 GLUCOSE BLOOD TEST: CPT

## 2019-11-18 PROCEDURE — 85347 COAGULATION TIME ACTIVATED: CPT

## 2019-11-18 PROCEDURE — 25010000002 HEPARIN (PORCINE) PER 1000 UNITS: Performed by: INTERNAL MEDICINE

## 2019-11-18 PROCEDURE — 25010000002 MIDAZOLAM PER 1 MG: Performed by: INTERNAL MEDICINE

## 2019-11-18 PROCEDURE — 85025 COMPLETE CBC W/AUTO DIFF WBC: CPT | Performed by: INTERNAL MEDICINE

## 2019-11-18 PROCEDURE — 93010 ELECTROCARDIOGRAM REPORT: CPT | Performed by: INTERNAL MEDICINE

## 2019-11-18 PROCEDURE — 92937 PRQ TRLUML REVSC CAB GRF 1: CPT | Performed by: INTERNAL MEDICINE

## 2019-11-18 PROCEDURE — C1769 GUIDE WIRE: HCPCS | Performed by: INTERNAL MEDICINE

## 2019-11-18 PROCEDURE — 80048 BASIC METABOLIC PNL TOTAL CA: CPT | Performed by: INTERNAL MEDICINE

## 2019-11-18 PROCEDURE — 99153 MOD SED SAME PHYS/QHP EA: CPT | Performed by: INTERNAL MEDICINE

## 2019-11-18 PROCEDURE — 92938 PR PRQ TRLUML CORONARY BYP GRFT REVASC ADDL VESSEL: CPT | Performed by: INTERNAL MEDICINE

## 2019-11-18 PROCEDURE — B2051ZZ PLAIN RADIOGRAPHY OF LEFT HEART USING LOW OSMOLAR CONTRAST: ICD-10-PCS | Performed by: INTERNAL MEDICINE

## 2019-11-18 PROCEDURE — 99152 MOD SED SAME PHYS/QHP 5/>YRS: CPT | Performed by: INTERNAL MEDICINE

## 2019-11-18 PROCEDURE — C1894 INTRO/SHEATH, NON-LASER: HCPCS | Performed by: INTERNAL MEDICINE

## 2019-11-18 PROCEDURE — 25010000002 BH (CUPID ONLY) ADENOSINE 6 MG/100ML MIXTURE: Performed by: INTERNAL MEDICINE

## 2019-11-18 PROCEDURE — 92938: CPT | Performed by: INTERNAL MEDICINE

## 2019-11-18 PROCEDURE — 76705 ECHO EXAM OF ABDOMEN: CPT

## 2019-11-18 PROCEDURE — 25010000002 FENTANYL CITRATE (PF) 100 MCG/2ML SOLUTION: Performed by: INTERNAL MEDICINE

## 2019-11-18 PROCEDURE — C9604 PERC D-E COR REVASC T CABG S: HCPCS | Performed by: INTERNAL MEDICINE

## 2019-11-18 PROCEDURE — 80076 HEPATIC FUNCTION PANEL: CPT | Performed by: INTERNAL MEDICINE

## 2019-11-18 PROCEDURE — C1887 CATHETER, GUIDING: HCPCS | Performed by: INTERNAL MEDICINE

## 2019-11-18 PROCEDURE — 80074 ACUTE HEPATITIS PANEL: CPT | Performed by: INTERNAL MEDICINE

## 2019-11-18 DEVICE — XIENCE SIERRA™ EVEROLIMUS ELUTING CORONARY STENT SYSTEM 3.50 MM X 23 MM / RAPID-EXCHANGE
Type: IMPLANTABLE DEVICE | Status: FUNCTIONAL
Brand: XIENCE SIERRA™

## 2019-11-18 RX ORDER — MIDAZOLAM HYDROCHLORIDE 1 MG/ML
INJECTION INTRAMUSCULAR; INTRAVENOUS AS NEEDED
Status: DISCONTINUED | OUTPATIENT
Start: 2019-11-18 | End: 2019-11-18 | Stop reason: HOSPADM

## 2019-11-18 RX ORDER — HYDROCODONE BITARTRATE AND ACETAMINOPHEN 5; 325 MG/1; MG/1
1 TABLET ORAL EVERY 4 HOURS PRN
Status: DISCONTINUED | OUTPATIENT
Start: 2019-11-18 | End: 2019-12-01 | Stop reason: HOSPADM

## 2019-11-18 RX ORDER — HEPARIN SODIUM 1000 [USP'U]/ML
INJECTION, SOLUTION INTRAVENOUS; SUBCUTANEOUS AS NEEDED
Status: DISCONTINUED | OUTPATIENT
Start: 2019-11-18 | End: 2019-11-18 | Stop reason: HOSPADM

## 2019-11-18 RX ORDER — POLYETHYLENE GLYCOL 3350 17 G/17G
17 POWDER, FOR SOLUTION ORAL DAILY
Status: DISCONTINUED | OUTPATIENT
Start: 2019-11-18 | End: 2019-11-27

## 2019-11-18 RX ORDER — MORPHINE SULFATE 2 MG/ML
1 INJECTION, SOLUTION INTRAMUSCULAR; INTRAVENOUS EVERY 4 HOURS PRN
Status: ACTIVE | OUTPATIENT
Start: 2019-11-18 | End: 2019-11-28

## 2019-11-18 RX ORDER — SODIUM CHLORIDE 9 MG/ML
100 INJECTION, SOLUTION INTRAVENOUS CONTINUOUS
Status: DISCONTINUED | OUTPATIENT
Start: 2019-11-18 | End: 2019-11-19

## 2019-11-18 RX ORDER — CLOPIDOGREL BISULFATE 75 MG/1
TABLET ORAL AS NEEDED
Status: DISCONTINUED | OUTPATIENT
Start: 2019-11-18 | End: 2019-11-18 | Stop reason: HOSPADM

## 2019-11-18 RX ORDER — ACETAMINOPHEN 325 MG/1
650 TABLET ORAL EVERY 4 HOURS PRN
Status: DISCONTINUED | OUTPATIENT
Start: 2019-11-18 | End: 2019-12-01 | Stop reason: HOSPADM

## 2019-11-18 RX ORDER — LIDOCAINE HYDROCHLORIDE 20 MG/ML
INJECTION, SOLUTION INFILTRATION; PERINEURAL AS NEEDED
Status: DISCONTINUED | OUTPATIENT
Start: 2019-11-18 | End: 2019-11-18 | Stop reason: HOSPADM

## 2019-11-18 RX ORDER — NALOXONE HCL 0.4 MG/ML
0.4 VIAL (ML) INJECTION
Status: DISCONTINUED | OUTPATIENT
Start: 2019-11-18 | End: 2019-12-01 | Stop reason: HOSPADM

## 2019-11-18 RX ORDER — FENTANYL CITRATE 50 UG/ML
INJECTION, SOLUTION INTRAMUSCULAR; INTRAVENOUS AS NEEDED
Status: DISCONTINUED | OUTPATIENT
Start: 2019-11-18 | End: 2019-11-18 | Stop reason: HOSPADM

## 2019-11-18 RX ADMIN — CLOPIDOGREL 75 MG: 75 TABLET, FILM COATED ORAL at 08:34

## 2019-11-18 RX ADMIN — GABAPENTIN 400 MG: 400 CAPSULE ORAL at 08:34

## 2019-11-18 RX ADMIN — SODIUM CHLORIDE 100 ML/HR: 9 INJECTION, SOLUTION INTRAVENOUS at 18:39

## 2019-11-18 RX ADMIN — FAMOTIDINE 20 MG: 20 TABLET, FILM COATED ORAL at 08:34

## 2019-11-18 RX ADMIN — ASPIRIN 81 MG: 81 TABLET, COATED ORAL at 08:34

## 2019-11-18 RX ADMIN — HEPARIN SODIUM 16 UNITS/KG/HR: 10000 INJECTION, SOLUTION INTRAVENOUS at 11:25

## 2019-11-18 RX ADMIN — INSULIN LISPRO 2 UNITS: 100 INJECTION, SOLUTION INTRAVENOUS; SUBCUTANEOUS at 20:41

## 2019-11-18 RX ADMIN — INSULIN LISPRO 4 UNITS: 100 INJECTION, SOLUTION INTRAVENOUS; SUBCUTANEOUS at 08:35

## 2019-11-18 RX ADMIN — HEPARIN SODIUM 2600 UNITS: 5000 INJECTION INTRAVENOUS; SUBCUTANEOUS at 07:07

## 2019-11-18 RX ADMIN — HEPARIN SODIUM 2600 UNITS: 5000 INJECTION INTRAVENOUS; SUBCUTANEOUS at 15:35

## 2019-11-18 RX ADMIN — ATORVASTATIN CALCIUM 80 MG: 80 TABLET, FILM COATED ORAL at 20:41

## 2019-11-18 RX ADMIN — SODIUM CHLORIDE 100 ML/HR: 9 INJECTION, SOLUTION INTRAVENOUS at 12:41

## 2019-11-18 RX ADMIN — DULOXETINE HYDROCHLORIDE 60 MG: 60 CAPSULE, DELAYED RELEASE ORAL at 20:40

## 2019-11-18 RX ADMIN — INSULIN LISPRO 3 UNITS: 100 INJECTION, SOLUTION INTRAVENOUS; SUBCUTANEOUS at 13:58

## 2019-11-18 RX ADMIN — GABAPENTIN 400 MG: 400 CAPSULE ORAL at 20:41

## 2019-11-19 ENCOUNTER — APPOINTMENT (OUTPATIENT)
Dept: GENERAL RADIOLOGY | Facility: HOSPITAL | Age: 64
End: 2019-11-19

## 2019-11-19 LAB
ACT BLD: 268 SECONDS (ref 82–152)
ACT BLD: 279 SECONDS (ref 82–152)
ALBUMIN SERPL-MCNC: 2.8 G/DL (ref 3.5–5.2)
ALBUMIN/GLOB SERPL: 0.7 G/DL
ALP SERPL-CCNC: 87 U/L (ref 39–117)
ALT SERPL W P-5'-P-CCNC: 63 U/L (ref 1–41)
ANION GAP SERPL CALCULATED.3IONS-SCNC: 11.3 MMOL/L (ref 5–15)
AST SERPL-CCNC: 32 U/L (ref 1–40)
BASOPHILS # BLD AUTO: 0.04 10*3/MM3 (ref 0–0.2)
BASOPHILS NFR BLD AUTO: 0.5 % (ref 0–1.5)
BILIRUB SERPL-MCNC: 0.5 MG/DL (ref 0.2–1.2)
BUN BLD-MCNC: 17 MG/DL (ref 8–23)
BUN/CREAT SERPL: 17.2 (ref 7–25)
CALCIUM SPEC-SCNC: 7.8 MG/DL (ref 8.6–10.5)
CHLORIDE SERPL-SCNC: 98 MMOL/L (ref 98–107)
CO2 SERPL-SCNC: 27.7 MMOL/L (ref 22–29)
CREAT BLD-MCNC: 0.99 MG/DL (ref 0.76–1.27)
DEPRECATED RDW RBC AUTO: 46.7 FL (ref 37–54)
EOSINOPHIL # BLD AUTO: 0.08 10*3/MM3 (ref 0–0.4)
EOSINOPHIL NFR BLD AUTO: 1 % (ref 0.3–6.2)
ERYTHROCYTE [DISTWIDTH] IN BLOOD BY AUTOMATED COUNT: 13.1 % (ref 12.3–15.4)
GFR SERPL CREATININE-BSD FRML MDRD: 76 ML/MIN/1.73
GLOBULIN UR ELPH-MCNC: 4 GM/DL
GLUCOSE BLD-MCNC: 311 MG/DL (ref 65–99)
GLUCOSE BLDC GLUCOMTR-MCNC: 205 MG/DL (ref 70–130)
GLUCOSE BLDC GLUCOMTR-MCNC: 237 MG/DL (ref 70–130)
GLUCOSE BLDC GLUCOMTR-MCNC: 302 MG/DL (ref 70–130)
GLUCOSE BLDC GLUCOMTR-MCNC: 329 MG/DL (ref 70–130)
GLUCOSE BLDC GLUCOMTR-MCNC: 351 MG/DL (ref 70–130)
HCT VFR BLD AUTO: 35.8 % (ref 37.5–51)
HGB BLD-MCNC: 11.9 G/DL (ref 13–17.7)
IMM GRANULOCYTES # BLD AUTO: 0.09 10*3/MM3 (ref 0–0.05)
IMM GRANULOCYTES NFR BLD AUTO: 1.1 % (ref 0–0.5)
LYMPHOCYTES # BLD AUTO: 0.86 10*3/MM3 (ref 0.7–3.1)
LYMPHOCYTES NFR BLD AUTO: 10.5 % (ref 19.6–45.3)
MCH RBC QN AUTO: 32.4 PG (ref 26.6–33)
MCHC RBC AUTO-ENTMCNC: 33.2 G/DL (ref 31.5–35.7)
MCV RBC AUTO: 97.5 FL (ref 79–97)
MONOCYTES # BLD AUTO: 0.69 10*3/MM3 (ref 0.1–0.9)
MONOCYTES NFR BLD AUTO: 8.4 % (ref 5–12)
NEUTROPHILS # BLD AUTO: 6.45 10*3/MM3 (ref 1.7–7)
NEUTROPHILS NFR BLD AUTO: 78.5 % (ref 42.7–76)
NRBC BLD AUTO-RTO: 0 /100 WBC (ref 0–0.2)
PLATELET # BLD AUTO: 150 10*3/MM3 (ref 140–450)
PMV BLD AUTO: 10.5 FL (ref 6–12)
POTASSIUM BLD-SCNC: 3.8 MMOL/L (ref 3.5–5.2)
PROT SERPL-MCNC: 6.8 G/DL (ref 6–8.5)
RBC # BLD AUTO: 3.67 10*6/MM3 (ref 4.14–5.8)
SODIUM BLD-SCNC: 137 MMOL/L (ref 136–145)
WBC NRBC COR # BLD: 8.21 10*3/MM3 (ref 3.4–10.8)

## 2019-11-19 PROCEDURE — 25010000002 FUROSEMIDE PER 20 MG

## 2019-11-19 PROCEDURE — 93010 ELECTROCARDIOGRAM REPORT: CPT | Performed by: INTERNAL MEDICINE

## 2019-11-19 PROCEDURE — 82962 GLUCOSE BLOOD TEST: CPT

## 2019-11-19 PROCEDURE — 80053 COMPREHEN METABOLIC PANEL: CPT | Performed by: INTERNAL MEDICINE

## 2019-11-19 PROCEDURE — 99232 SBSQ HOSP IP/OBS MODERATE 35: CPT | Performed by: INTERNAL MEDICINE

## 2019-11-19 PROCEDURE — 93005 ELECTROCARDIOGRAM TRACING: CPT | Performed by: INTERNAL MEDICINE

## 2019-11-19 PROCEDURE — 71045 X-RAY EXAM CHEST 1 VIEW: CPT

## 2019-11-19 PROCEDURE — 25010000002 FUROSEMIDE PER 20 MG: Performed by: INTERNAL MEDICINE

## 2019-11-19 PROCEDURE — 94640 AIRWAY INHALATION TREATMENT: CPT

## 2019-11-19 PROCEDURE — 85025 COMPLETE CBC W/AUTO DIFF WBC: CPT | Performed by: INTERNAL MEDICINE

## 2019-11-19 PROCEDURE — 94799 UNLISTED PULMONARY SVC/PX: CPT

## 2019-11-19 PROCEDURE — 63710000001 INSULIN LISPRO (HUMAN) PER 5 UNITS: Performed by: INTERNAL MEDICINE

## 2019-11-19 PROCEDURE — 63710000001 INSULIN GLARGINE PER 5 UNITS: Performed by: INTERNAL MEDICINE

## 2019-11-19 RX ORDER — FUROSEMIDE 10 MG/ML
40 INJECTION INTRAMUSCULAR; INTRAVENOUS ONCE
Status: COMPLETED | OUTPATIENT
Start: 2019-11-19 | End: 2019-11-19

## 2019-11-19 RX ORDER — INSULIN GLARGINE 100 [IU]/ML
10 INJECTION, SOLUTION SUBCUTANEOUS EVERY 12 HOURS SCHEDULED
Status: COMPLETED | OUTPATIENT
Start: 2019-11-19 | End: 2019-11-19

## 2019-11-19 RX ORDER — FUROSEMIDE 10 MG/ML
INJECTION INTRAMUSCULAR; INTRAVENOUS
Status: COMPLETED
Start: 2019-11-19 | End: 2019-11-19

## 2019-11-19 RX ORDER — POTASSIUM CHLORIDE 750 MG/1
40 CAPSULE, EXTENDED RELEASE ORAL ONCE
Status: COMPLETED | OUTPATIENT
Start: 2019-11-19 | End: 2019-11-19

## 2019-11-19 RX ORDER — FUROSEMIDE 10 MG/ML
40 INJECTION INTRAMUSCULAR; INTRAVENOUS EVERY 12 HOURS
Status: DISCONTINUED | OUTPATIENT
Start: 2019-11-19 | End: 2019-11-21

## 2019-11-19 RX ADMIN — FUROSEMIDE 40 MG: 40 INJECTION, SOLUTION INTRAMUSCULAR; INTRAVENOUS at 08:00

## 2019-11-19 RX ADMIN — INSULIN GLARGINE 10 UNITS: 100 INJECTION, SOLUTION SUBCUTANEOUS at 12:18

## 2019-11-19 RX ADMIN — FAMOTIDINE 20 MG: 20 TABLET, FILM COATED ORAL at 17:46

## 2019-11-19 RX ADMIN — POTASSIUM CHLORIDE 40 MEQ: 750 CAPSULE, EXTENDED RELEASE ORAL at 12:18

## 2019-11-19 RX ADMIN — FUROSEMIDE 40 MG: 10 INJECTION INTRAMUSCULAR; INTRAVENOUS at 08:00

## 2019-11-19 RX ADMIN — FUROSEMIDE 40 MG: 40 INJECTION, SOLUTION INTRAMUSCULAR; INTRAVENOUS at 20:16

## 2019-11-19 RX ADMIN — INSULIN GLARGINE 10 UNITS: 100 INJECTION, SOLUTION SUBCUTANEOUS at 20:16

## 2019-11-19 RX ADMIN — GABAPENTIN 400 MG: 400 CAPSULE ORAL at 20:17

## 2019-11-19 RX ADMIN — INSULIN LISPRO 6 UNITS: 100 INJECTION, SOLUTION INTRAVENOUS; SUBCUTANEOUS at 08:58

## 2019-11-19 RX ADMIN — ASPIRIN 81 MG: 81 TABLET, COATED ORAL at 08:58

## 2019-11-19 RX ADMIN — HYDROCODONE BITARTRATE AND ACETAMINOPHEN 1 TABLET: 5; 325 TABLET ORAL at 18:28

## 2019-11-19 RX ADMIN — CLOPIDOGREL 75 MG: 75 TABLET, FILM COATED ORAL at 08:58

## 2019-11-19 RX ADMIN — IPRATROPIUM BROMIDE AND ALBUTEROL SULFATE 3 ML: 2.5; .5 SOLUTION RESPIRATORY (INHALATION) at 21:30

## 2019-11-19 RX ADMIN — GABAPENTIN 400 MG: 400 CAPSULE ORAL at 08:58

## 2019-11-19 RX ADMIN — ATORVASTATIN CALCIUM 80 MG: 80 TABLET, FILM COATED ORAL at 20:16

## 2019-11-19 RX ADMIN — DULOXETINE HYDROCHLORIDE 60 MG: 60 CAPSULE, DELAYED RELEASE ORAL at 20:16

## 2019-11-19 RX ADMIN — IPRATROPIUM BROMIDE AND ALBUTEROL SULFATE 3 ML: 2.5; .5 SOLUTION RESPIRATORY (INHALATION) at 04:15

## 2019-11-19 RX ADMIN — FAMOTIDINE 20 MG: 20 TABLET, FILM COATED ORAL at 08:58

## 2019-11-19 RX ADMIN — INSULIN LISPRO 3 UNITS: 100 INJECTION, SOLUTION INTRAVENOUS; SUBCUTANEOUS at 20:51

## 2019-11-19 RX ADMIN — INSULIN LISPRO 3 UNITS: 100 INJECTION, SOLUTION INTRAVENOUS; SUBCUTANEOUS at 17:46

## 2019-11-19 RX ADMIN — INSULIN LISPRO 5 UNITS: 100 INJECTION, SOLUTION INTRAVENOUS; SUBCUTANEOUS at 12:18

## 2019-11-19 RX ADMIN — POLYETHYLENE GLYCOL 3350 17 G: 17 POWDER, FOR SOLUTION ORAL at 08:58

## 2019-11-20 ENCOUNTER — APPOINTMENT (OUTPATIENT)
Dept: GENERAL RADIOLOGY | Facility: HOSPITAL | Age: 64
End: 2019-11-20

## 2019-11-20 LAB
ANION GAP SERPL CALCULATED.3IONS-SCNC: 7.8 MMOL/L (ref 5–15)
BASOPHILS # BLD AUTO: 0.01 10*3/MM3 (ref 0–0.2)
BASOPHILS NFR BLD AUTO: 0.2 % (ref 0–1.5)
BUN BLD-MCNC: 14 MG/DL (ref 8–23)
BUN/CREAT SERPL: 14.7 (ref 7–25)
CALCIUM SPEC-SCNC: 7.7 MG/DL (ref 8.6–10.5)
CHLORIDE SERPL-SCNC: 100 MMOL/L (ref 98–107)
CO2 SERPL-SCNC: 31.2 MMOL/L (ref 22–29)
CREAT BLD-MCNC: 0.95 MG/DL (ref 0.76–1.27)
DEPRECATED RDW RBC AUTO: 44.6 FL (ref 37–54)
EOSINOPHIL # BLD AUTO: 0.11 10*3/MM3 (ref 0–0.4)
EOSINOPHIL NFR BLD AUTO: 1.8 % (ref 0.3–6.2)
ERYTHROCYTE [DISTWIDTH] IN BLOOD BY AUTOMATED COUNT: 13.2 % (ref 12.3–15.4)
GFR SERPL CREATININE-BSD FRML MDRD: 80 ML/MIN/1.73
GLUCOSE BLD-MCNC: 141 MG/DL (ref 65–99)
GLUCOSE BLDC GLUCOMTR-MCNC: 153 MG/DL (ref 70–130)
GLUCOSE BLDC GLUCOMTR-MCNC: 183 MG/DL (ref 70–130)
GLUCOSE BLDC GLUCOMTR-MCNC: 215 MG/DL (ref 70–130)
GLUCOSE BLDC GLUCOMTR-MCNC: 217 MG/DL (ref 70–130)
GLUCOSE BLDC GLUCOMTR-MCNC: 228 MG/DL (ref 70–130)
HCT VFR BLD AUTO: 31.4 % (ref 37.5–51)
HGB BLD-MCNC: 11.1 G/DL (ref 13–17.7)
IMM GRANULOCYTES # BLD AUTO: 0.06 10*3/MM3 (ref 0–0.05)
IMM GRANULOCYTES NFR BLD AUTO: 1 % (ref 0–0.5)
LYMPHOCYTES # BLD AUTO: 1.1 10*3/MM3 (ref 0.7–3.1)
LYMPHOCYTES NFR BLD AUTO: 17.6 % (ref 19.6–45.3)
MCH RBC QN AUTO: 33 PG (ref 26.6–33)
MCHC RBC AUTO-ENTMCNC: 35.4 G/DL (ref 31.5–35.7)
MCV RBC AUTO: 93.5 FL (ref 79–97)
MONOCYTES # BLD AUTO: 0.56 10*3/MM3 (ref 0.1–0.9)
MONOCYTES NFR BLD AUTO: 8.9 % (ref 5–12)
NEUTROPHILS # BLD AUTO: 4.42 10*3/MM3 (ref 1.7–7)
NEUTROPHILS NFR BLD AUTO: 70.5 % (ref 42.7–76)
NRBC BLD AUTO-RTO: 0 /100 WBC (ref 0–0.2)
PLATELET # BLD AUTO: 145 10*3/MM3 (ref 140–450)
PMV BLD AUTO: 10.3 FL (ref 6–12)
POTASSIUM BLD-SCNC: 3.6 MMOL/L (ref 3.5–5.2)
RBC # BLD AUTO: 3.36 10*6/MM3 (ref 4.14–5.8)
SODIUM BLD-SCNC: 139 MMOL/L (ref 136–145)
WBC NRBC COR # BLD: 6.26 10*3/MM3 (ref 3.4–10.8)

## 2019-11-20 PROCEDURE — 63710000001 INSULIN GLARGINE PER 5 UNITS: Performed by: INTERNAL MEDICINE

## 2019-11-20 PROCEDURE — 82962 GLUCOSE BLOOD TEST: CPT

## 2019-11-20 PROCEDURE — 99231 SBSQ HOSP IP/OBS SF/LOW 25: CPT | Performed by: INTERNAL MEDICINE

## 2019-11-20 PROCEDURE — 85025 COMPLETE CBC W/AUTO DIFF WBC: CPT | Performed by: INTERNAL MEDICINE

## 2019-11-20 PROCEDURE — 71045 X-RAY EXAM CHEST 1 VIEW: CPT

## 2019-11-20 PROCEDURE — 99232 SBSQ HOSP IP/OBS MODERATE 35: CPT | Performed by: INTERNAL MEDICINE

## 2019-11-20 PROCEDURE — 63710000001 INSULIN LISPRO (HUMAN) PER 5 UNITS: Performed by: INTERNAL MEDICINE

## 2019-11-20 PROCEDURE — 93005 ELECTROCARDIOGRAM TRACING: CPT | Performed by: INTERNAL MEDICINE

## 2019-11-20 PROCEDURE — 80048 BASIC METABOLIC PNL TOTAL CA: CPT | Performed by: INTERNAL MEDICINE

## 2019-11-20 PROCEDURE — 93010 ELECTROCARDIOGRAM REPORT: CPT | Performed by: INTERNAL MEDICINE

## 2019-11-20 PROCEDURE — 25010000002 FUROSEMIDE PER 20 MG: Performed by: INTERNAL MEDICINE

## 2019-11-20 RX ORDER — INSULIN GLARGINE 100 [IU]/ML
10 INJECTION, SOLUTION SUBCUTANEOUS 2 TIMES DAILY
Status: COMPLETED | OUTPATIENT
Start: 2019-11-20 | End: 2019-11-20

## 2019-11-20 RX ORDER — POTASSIUM CHLORIDE 7.45 MG/ML
10 INJECTION INTRAVENOUS
Status: DISCONTINUED | OUTPATIENT
Start: 2019-11-20 | End: 2019-12-01 | Stop reason: HOSPADM

## 2019-11-20 RX ORDER — POTASSIUM CHLORIDE 750 MG/1
40 CAPSULE, EXTENDED RELEASE ORAL AS NEEDED
Status: DISCONTINUED | OUTPATIENT
Start: 2019-11-20 | End: 2019-12-01 | Stop reason: HOSPADM

## 2019-11-20 RX ORDER — POTASSIUM CHLORIDE 1.5 G/1.77G
40 POWDER, FOR SOLUTION ORAL AS NEEDED
Status: DISCONTINUED | OUTPATIENT
Start: 2019-11-20 | End: 2019-12-01 | Stop reason: HOSPADM

## 2019-11-20 RX ADMIN — FUROSEMIDE 40 MG: 40 INJECTION, SOLUTION INTRAMUSCULAR; INTRAVENOUS at 21:06

## 2019-11-20 RX ADMIN — INSULIN LISPRO 3 UNITS: 100 INJECTION, SOLUTION INTRAVENOUS; SUBCUTANEOUS at 17:52

## 2019-11-20 RX ADMIN — INSULIN LISPRO 2 UNITS: 100 INJECTION, SOLUTION INTRAVENOUS; SUBCUTANEOUS at 08:15

## 2019-11-20 RX ADMIN — FUROSEMIDE 40 MG: 40 INJECTION, SOLUTION INTRAMUSCULAR; INTRAVENOUS at 09:20

## 2019-11-20 RX ADMIN — FAMOTIDINE 20 MG: 20 TABLET, FILM COATED ORAL at 17:58

## 2019-11-20 RX ADMIN — GABAPENTIN 400 MG: 400 CAPSULE ORAL at 09:20

## 2019-11-20 RX ADMIN — CLOPIDOGREL 75 MG: 75 TABLET, FILM COATED ORAL at 09:20

## 2019-11-20 RX ADMIN — DULOXETINE HYDROCHLORIDE 60 MG: 60 CAPSULE, DELAYED RELEASE ORAL at 21:48

## 2019-11-20 RX ADMIN — INSULIN LISPRO 2 UNITS: 100 INJECTION, SOLUTION INTRAVENOUS; SUBCUTANEOUS at 21:06

## 2019-11-20 RX ADMIN — GABAPENTIN 400 MG: 400 CAPSULE ORAL at 17:58

## 2019-11-20 RX ADMIN — GABAPENTIN 400 MG: 400 CAPSULE ORAL at 21:06

## 2019-11-20 RX ADMIN — ASPIRIN 81 MG: 81 TABLET, COATED ORAL at 09:20

## 2019-11-20 RX ADMIN — INSULIN GLARGINE 10 UNITS: 100 INJECTION, SOLUTION SUBCUTANEOUS at 12:31

## 2019-11-20 RX ADMIN — INSULIN LISPRO 3 UNITS: 100 INJECTION, SOLUTION INTRAVENOUS; SUBCUTANEOUS at 12:31

## 2019-11-20 RX ADMIN — ATORVASTATIN CALCIUM 80 MG: 80 TABLET, FILM COATED ORAL at 21:48

## 2019-11-20 RX ADMIN — FAMOTIDINE 20 MG: 20 TABLET, FILM COATED ORAL at 06:33

## 2019-11-20 RX ADMIN — POLYETHYLENE GLYCOL 3350 17 G: 17 POWDER, FOR SOLUTION ORAL at 09:20

## 2019-11-20 RX ADMIN — INSULIN GLARGINE 10 UNITS: 100 INJECTION, SOLUTION SUBCUTANEOUS at 21:11

## 2019-11-21 ENCOUNTER — APPOINTMENT (OUTPATIENT)
Dept: GENERAL RADIOLOGY | Facility: HOSPITAL | Age: 64
End: 2019-11-21

## 2019-11-21 LAB
ANION GAP SERPL CALCULATED.3IONS-SCNC: 10.2 MMOL/L (ref 5–15)
BASOPHILS # BLD AUTO: 0.01 10*3/MM3 (ref 0–0.2)
BASOPHILS NFR BLD AUTO: 0.2 % (ref 0–1.5)
BUN BLD-MCNC: 13 MG/DL (ref 8–23)
BUN/CREAT SERPL: 15.1 (ref 7–25)
CALCIUM SPEC-SCNC: 7.9 MG/DL (ref 8.6–10.5)
CHLORIDE SERPL-SCNC: 94 MMOL/L (ref 98–107)
CO2 SERPL-SCNC: 33.8 MMOL/L (ref 22–29)
CREAT BLD-MCNC: 0.86 MG/DL (ref 0.76–1.27)
DEPRECATED RDW RBC AUTO: 44.1 FL (ref 37–54)
EOSINOPHIL # BLD AUTO: 0.12 10*3/MM3 (ref 0–0.4)
EOSINOPHIL NFR BLD AUTO: 1.9 % (ref 0.3–6.2)
ERYTHROCYTE [DISTWIDTH] IN BLOOD BY AUTOMATED COUNT: 13 % (ref 12.3–15.4)
GFR SERPL CREATININE-BSD FRML MDRD: 89 ML/MIN/1.73
GLUCOSE BLD-MCNC: 263 MG/DL (ref 65–99)
GLUCOSE BLDC GLUCOMTR-MCNC: 171 MG/DL (ref 70–130)
GLUCOSE BLDC GLUCOMTR-MCNC: 203 MG/DL (ref 70–130)
GLUCOSE BLDC GLUCOMTR-MCNC: 255 MG/DL (ref 70–130)
HCT VFR BLD AUTO: 34.1 % (ref 37.5–51)
HGB BLD-MCNC: 11.7 G/DL (ref 13–17.7)
IMM GRANULOCYTES # BLD AUTO: 0.05 10*3/MM3 (ref 0–0.05)
IMM GRANULOCYTES NFR BLD AUTO: 0.8 % (ref 0–0.5)
LYMPHOCYTES # BLD AUTO: 0.84 10*3/MM3 (ref 0.7–3.1)
LYMPHOCYTES NFR BLD AUTO: 13.1 % (ref 19.6–45.3)
MCH RBC QN AUTO: 32.2 PG (ref 26.6–33)
MCHC RBC AUTO-ENTMCNC: 34.3 G/DL (ref 31.5–35.7)
MCV RBC AUTO: 93.9 FL (ref 79–97)
MONOCYTES # BLD AUTO: 0.46 10*3/MM3 (ref 0.1–0.9)
MONOCYTES NFR BLD AUTO: 7.2 % (ref 5–12)
NEUTROPHILS # BLD AUTO: 4.91 10*3/MM3 (ref 1.7–7)
NEUTROPHILS NFR BLD AUTO: 76.8 % (ref 42.7–76)
NRBC BLD AUTO-RTO: 0 /100 WBC (ref 0–0.2)
PLATELET # BLD AUTO: 147 10*3/MM3 (ref 140–450)
PMV BLD AUTO: 10.4 FL (ref 6–12)
POTASSIUM BLD-SCNC: 4.2 MMOL/L (ref 3.5–5.2)
RBC # BLD AUTO: 3.63 10*6/MM3 (ref 4.14–5.8)
SODIUM BLD-SCNC: 138 MMOL/L (ref 136–145)
WBC NRBC COR # BLD: 6.39 10*3/MM3 (ref 3.4–10.8)

## 2019-11-21 PROCEDURE — 80048 BASIC METABOLIC PNL TOTAL CA: CPT | Performed by: INTERNAL MEDICINE

## 2019-11-21 PROCEDURE — 99233 SBSQ HOSP IP/OBS HIGH 50: CPT | Performed by: NURSE PRACTITIONER

## 2019-11-21 PROCEDURE — 94799 UNLISTED PULMONARY SVC/PX: CPT

## 2019-11-21 PROCEDURE — 85025 COMPLETE CBC W/AUTO DIFF WBC: CPT | Performed by: INTERNAL MEDICINE

## 2019-11-21 PROCEDURE — 82962 GLUCOSE BLOOD TEST: CPT

## 2019-11-21 PROCEDURE — 25010000002 FUROSEMIDE PER 20 MG: Performed by: INTERNAL MEDICINE

## 2019-11-21 PROCEDURE — 63710000001 INSULIN LISPRO (HUMAN) PER 5 UNITS: Performed by: INTERNAL MEDICINE

## 2019-11-21 PROCEDURE — 71046 X-RAY EXAM CHEST 2 VIEWS: CPT

## 2019-11-21 RX ORDER — FUROSEMIDE 40 MG/1
40 TABLET ORAL DAILY
Status: DISCONTINUED | OUTPATIENT
Start: 2019-11-21 | End: 2019-11-26

## 2019-11-21 RX ADMIN — METFORMIN HYDROCHLORIDE 500 MG: 500 TABLET ORAL at 08:20

## 2019-11-21 RX ADMIN — INSULIN LISPRO 4 UNITS: 100 INJECTION, SOLUTION INTRAVENOUS; SUBCUTANEOUS at 06:54

## 2019-11-21 RX ADMIN — IPRATROPIUM BROMIDE AND ALBUTEROL SULFATE 3 ML: 2.5; .5 SOLUTION RESPIRATORY (INHALATION) at 11:44

## 2019-11-21 RX ADMIN — POLYETHYLENE GLYCOL 3350 17 G: 17 POWDER, FOR SOLUTION ORAL at 08:23

## 2019-11-21 RX ADMIN — GABAPENTIN 400 MG: 400 CAPSULE ORAL at 08:20

## 2019-11-21 RX ADMIN — INSULIN LISPRO 2 UNITS: 100 INJECTION, SOLUTION INTRAVENOUS; SUBCUTANEOUS at 20:43

## 2019-11-21 RX ADMIN — GABAPENTIN 400 MG: 400 CAPSULE ORAL at 20:43

## 2019-11-21 RX ADMIN — INSULIN LISPRO 4 UNITS: 100 INJECTION, SOLUTION INTRAVENOUS; SUBCUTANEOUS at 12:27

## 2019-11-21 RX ADMIN — FAMOTIDINE 20 MG: 20 TABLET, FILM COATED ORAL at 06:32

## 2019-11-21 RX ADMIN — IPRATROPIUM BROMIDE AND ALBUTEROL SULFATE 3 ML: 2.5; .5 SOLUTION RESPIRATORY (INHALATION) at 21:16

## 2019-11-21 RX ADMIN — LINAGLIPTIN 5 MG: 5 TABLET, FILM COATED ORAL at 12:27

## 2019-11-21 RX ADMIN — ATORVASTATIN CALCIUM 80 MG: 80 TABLET, FILM COATED ORAL at 20:43

## 2019-11-21 RX ADMIN — GABAPENTIN 400 MG: 400 CAPSULE ORAL at 16:49

## 2019-11-21 RX ADMIN — METFORMIN HYDROCHLORIDE 1000 MG: 1000 TABLET ORAL at 16:49

## 2019-11-21 RX ADMIN — INSULIN LISPRO 3 UNITS: 100 INJECTION, SOLUTION INTRAVENOUS; SUBCUTANEOUS at 16:49

## 2019-11-21 RX ADMIN — ASPIRIN 81 MG: 81 TABLET, COATED ORAL at 08:20

## 2019-11-21 RX ADMIN — FAMOTIDINE 20 MG: 20 TABLET, FILM COATED ORAL at 16:49

## 2019-11-21 RX ADMIN — FUROSEMIDE 40 MG: 40 INJECTION, SOLUTION INTRAMUSCULAR; INTRAVENOUS at 08:20

## 2019-11-21 RX ADMIN — DULOXETINE HYDROCHLORIDE 60 MG: 60 CAPSULE, DELAYED RELEASE ORAL at 20:43

## 2019-11-21 RX ADMIN — CLOPIDOGREL 75 MG: 75 TABLET, FILM COATED ORAL at 08:20

## 2019-11-22 LAB
ANION GAP SERPL CALCULATED.3IONS-SCNC: 10.4 MMOL/L (ref 5–15)
BUN BLD-MCNC: 12 MG/DL (ref 8–23)
BUN/CREAT SERPL: 13.8 (ref 7–25)
CALCIUM SPEC-SCNC: 8.3 MG/DL (ref 8.6–10.5)
CHLORIDE SERPL-SCNC: 96 MMOL/L (ref 98–107)
CO2 SERPL-SCNC: 31.6 MMOL/L (ref 22–29)
CREAT BLD-MCNC: 0.87 MG/DL (ref 0.76–1.27)
GFR SERPL CREATININE-BSD FRML MDRD: 88 ML/MIN/1.73
GLUCOSE BLD-MCNC: 145 MG/DL (ref 65–99)
GLUCOSE BLDC GLUCOMTR-MCNC: 131 MG/DL (ref 70–130)
GLUCOSE BLDC GLUCOMTR-MCNC: 144 MG/DL (ref 70–130)
GLUCOSE BLDC GLUCOMTR-MCNC: 159 MG/DL (ref 70–130)
GLUCOSE BLDC GLUCOMTR-MCNC: 187 MG/DL (ref 70–130)
POTASSIUM BLD-SCNC: 4.2 MMOL/L (ref 3.5–5.2)
SODIUM BLD-SCNC: 138 MMOL/L (ref 136–145)

## 2019-11-22 PROCEDURE — 63710000001 PREDNISONE PER 1 MG: Performed by: INTERNAL MEDICINE

## 2019-11-22 PROCEDURE — 99232 SBSQ HOSP IP/OBS MODERATE 35: CPT | Performed by: NURSE PRACTITIONER

## 2019-11-22 PROCEDURE — 80048 BASIC METABOLIC PNL TOTAL CA: CPT | Performed by: INTERNAL MEDICINE

## 2019-11-22 PROCEDURE — 82962 GLUCOSE BLOOD TEST: CPT

## 2019-11-22 PROCEDURE — 63710000001 INSULIN LISPRO (HUMAN) PER 5 UNITS: Performed by: INTERNAL MEDICINE

## 2019-11-22 RX ORDER — PREDNISONE 20 MG/1
20 TABLET ORAL
Status: DISCONTINUED | OUTPATIENT
Start: 2019-11-22 | End: 2019-11-23

## 2019-11-22 RX ADMIN — GABAPENTIN 400 MG: 400 CAPSULE ORAL at 20:21

## 2019-11-22 RX ADMIN — ATORVASTATIN CALCIUM 80 MG: 80 TABLET, FILM COATED ORAL at 20:21

## 2019-11-22 RX ADMIN — FAMOTIDINE 20 MG: 20 TABLET, FILM COATED ORAL at 20:21

## 2019-11-22 RX ADMIN — INSULIN LISPRO 2 UNITS: 100 INJECTION, SOLUTION INTRAVENOUS; SUBCUTANEOUS at 16:46

## 2019-11-22 RX ADMIN — CLOPIDOGREL 75 MG: 75 TABLET, FILM COATED ORAL at 08:42

## 2019-11-22 RX ADMIN — POLYETHYLENE GLYCOL 3350 17 G: 17 POWDER, FOR SOLUTION ORAL at 08:43

## 2019-11-22 RX ADMIN — METFORMIN HYDROCHLORIDE 1000 MG: 1000 TABLET ORAL at 16:45

## 2019-11-22 RX ADMIN — HYDROCODONE BITARTRATE AND ACETAMINOPHEN 1 TABLET: 5; 325 TABLET ORAL at 06:33

## 2019-11-22 RX ADMIN — PREDNISONE 20 MG: 20 TABLET ORAL at 18:23

## 2019-11-22 RX ADMIN — METFORMIN HYDROCHLORIDE 1000 MG: 1000 TABLET ORAL at 08:43

## 2019-11-22 RX ADMIN — GABAPENTIN 400 MG: 400 CAPSULE ORAL at 08:43

## 2019-11-22 RX ADMIN — LINAGLIPTIN 5 MG: 5 TABLET, FILM COATED ORAL at 08:43

## 2019-11-22 RX ADMIN — FAMOTIDINE 20 MG: 20 TABLET, FILM COATED ORAL at 06:33

## 2019-11-22 RX ADMIN — FUROSEMIDE 40 MG: 40 TABLET ORAL at 08:43

## 2019-11-22 RX ADMIN — GABAPENTIN 400 MG: 400 CAPSULE ORAL at 16:45

## 2019-11-22 RX ADMIN — ASPIRIN 81 MG: 81 TABLET, COATED ORAL at 08:43

## 2019-11-22 RX ADMIN — INSULIN LISPRO 2 UNITS: 100 INJECTION, SOLUTION INTRAVENOUS; SUBCUTANEOUS at 11:39

## 2019-11-22 RX ADMIN — DULOXETINE HYDROCHLORIDE 60 MG: 60 CAPSULE, DELAYED RELEASE ORAL at 20:21

## 2019-11-23 ENCOUNTER — APPOINTMENT (OUTPATIENT)
Dept: GENERAL RADIOLOGY | Facility: HOSPITAL | Age: 64
End: 2019-11-23

## 2019-11-23 LAB
ANION GAP SERPL CALCULATED.3IONS-SCNC: 8.7 MMOL/L (ref 5–15)
ARTERIAL PATENCY WRIST A: ABNORMAL
ATMOSPHERIC PRESS: 741 MMHG
BASE EXCESS BLDA CALC-SCNC: 3.6 MMOL/L (ref 0–2)
BASOPHILS # BLD AUTO: 0.03 10*3/MM3 (ref 0–0.2)
BASOPHILS NFR BLD AUTO: 0.4 % (ref 0–1.5)
BDY SITE: ABNORMAL
BUN BLD-MCNC: 15 MG/DL (ref 8–23)
BUN/CREAT SERPL: 19.5 (ref 7–25)
CALCIUM SPEC-SCNC: 8.4 MG/DL (ref 8.6–10.5)
CHLORIDE SERPL-SCNC: 98 MMOL/L (ref 98–107)
CO2 SERPL-SCNC: 28.3 MMOL/L (ref 22–29)
CREAT BLD-MCNC: 0.77 MG/DL (ref 0.76–1.27)
DEPRECATED RDW RBC AUTO: 43.5 FL (ref 37–54)
EOSINOPHIL # BLD AUTO: 0.14 10*3/MM3 (ref 0–0.4)
EOSINOPHIL NFR BLD AUTO: 1.7 % (ref 0.3–6.2)
ERYTHROCYTE [DISTWIDTH] IN BLOOD BY AUTOMATED COUNT: 13 % (ref 12.3–15.4)
GAS FLOW AIRWAY: 8 LPM
GFR SERPL CREATININE-BSD FRML MDRD: 101 ML/MIN/1.73
GLUCOSE BLD-MCNC: 167 MG/DL (ref 65–99)
GLUCOSE BLDC GLUCOMTR-MCNC: 179 MG/DL (ref 70–130)
GLUCOSE BLDC GLUCOMTR-MCNC: 238 MG/DL (ref 70–130)
GLUCOSE BLDC GLUCOMTR-MCNC: 283 MG/DL (ref 70–130)
GLUCOSE BLDC GLUCOMTR-MCNC: 287 MG/DL (ref 70–130)
HCO3 BLDA-SCNC: 28.8 MMOL/L (ref 22–28)
HCT VFR BLD AUTO: 35.6 % (ref 37.5–51)
HGB BLD-MCNC: 11.9 G/DL (ref 13–17.7)
IMM GRANULOCYTES # BLD AUTO: 0.07 10*3/MM3 (ref 0–0.05)
IMM GRANULOCYTES NFR BLD AUTO: 0.8 % (ref 0–0.5)
LYMPHOCYTES # BLD AUTO: 1.2 10*3/MM3 (ref 0.7–3.1)
LYMPHOCYTES NFR BLD AUTO: 14.5 % (ref 19.6–45.3)
MCH RBC QN AUTO: 31 PG (ref 26.6–33)
MCHC RBC AUTO-ENTMCNC: 33.4 G/DL (ref 31.5–35.7)
MCV RBC AUTO: 92.7 FL (ref 79–97)
MODALITY: ABNORMAL
MONOCYTES # BLD AUTO: 0.61 10*3/MM3 (ref 0.1–0.9)
MONOCYTES NFR BLD AUTO: 7.4 % (ref 5–12)
NEUTROPHILS # BLD AUTO: 6.22 10*3/MM3 (ref 1.7–7)
NEUTROPHILS NFR BLD AUTO: 75.2 % (ref 42.7–76)
NRBC BLD AUTO-RTO: 0 /100 WBC (ref 0–0.2)
NT-PROBNP SERPL-MCNC: 2910 PG/ML (ref 5–900)
PCO2 BLDA: 45.2 MM HG (ref 35–45)
PH BLDA: 7.41 PH UNITS (ref 7.35–7.45)
PLATELET # BLD AUTO: 159 10*3/MM3 (ref 140–450)
PMV BLD AUTO: 10.5 FL (ref 6–12)
PO2 BLDA: 53.7 MM HG (ref 80–100)
POTASSIUM BLD-SCNC: 4.9 MMOL/L (ref 3.5–5.2)
RBC # BLD AUTO: 3.84 10*6/MM3 (ref 4.14–5.8)
SAO2 % BLDCOA: 87.5 % (ref 92–99)
SET MECH RESP RATE: 16
SODIUM BLD-SCNC: 135 MMOL/L (ref 136–145)
VENTILATOR MODE: ABNORMAL
WBC NRBC COR # BLD: 8.27 10*3/MM3 (ref 3.4–10.8)

## 2019-11-23 PROCEDURE — 85025 COMPLETE CBC W/AUTO DIFF WBC: CPT | Performed by: INTERNAL MEDICINE

## 2019-11-23 PROCEDURE — 80048 BASIC METABOLIC PNL TOTAL CA: CPT | Performed by: INTERNAL MEDICINE

## 2019-11-23 PROCEDURE — 25010000002 FUROSEMIDE PER 20 MG: Performed by: INTERNAL MEDICINE

## 2019-11-23 PROCEDURE — 94660 CPAP INITIATION&MGMT: CPT

## 2019-11-23 PROCEDURE — 63710000001 PREDNISONE PER 1 MG: Performed by: INTERNAL MEDICINE

## 2019-11-23 PROCEDURE — 94799 UNLISTED PULMONARY SVC/PX: CPT

## 2019-11-23 PROCEDURE — 99232 SBSQ HOSP IP/OBS MODERATE 35: CPT | Performed by: INTERNAL MEDICINE

## 2019-11-23 PROCEDURE — 25010000002 ENOXAPARIN PER 10 MG: Performed by: INTERNAL MEDICINE

## 2019-11-23 PROCEDURE — 83880 ASSAY OF NATRIURETIC PEPTIDE: CPT | Performed by: INTERNAL MEDICINE

## 2019-11-23 PROCEDURE — 82962 GLUCOSE BLOOD TEST: CPT

## 2019-11-23 PROCEDURE — 36600 WITHDRAWAL OF ARTERIAL BLOOD: CPT

## 2019-11-23 PROCEDURE — 82803 BLOOD GASES ANY COMBINATION: CPT

## 2019-11-23 PROCEDURE — 71045 X-RAY EXAM CHEST 1 VIEW: CPT

## 2019-11-23 PROCEDURE — 63710000001 INSULIN LISPRO (HUMAN) PER 5 UNITS: Performed by: INTERNAL MEDICINE

## 2019-11-23 RX ORDER — IPRATROPIUM BROMIDE AND ALBUTEROL SULFATE 2.5; .5 MG/3ML; MG/3ML
3 SOLUTION RESPIRATORY (INHALATION)
Status: DISCONTINUED | OUTPATIENT
Start: 2019-11-23 | End: 2019-11-28

## 2019-11-23 RX ORDER — PREDNISONE 20 MG/1
20 TABLET ORAL DAILY
Status: COMPLETED | OUTPATIENT
Start: 2019-11-24 | End: 2019-11-26

## 2019-11-23 RX ORDER — FUROSEMIDE 10 MG/ML
40 INJECTION INTRAMUSCULAR; INTRAVENOUS ONCE
Status: COMPLETED | OUTPATIENT
Start: 2019-11-23 | End: 2019-11-23

## 2019-11-23 RX ORDER — IPRATROPIUM BROMIDE AND ALBUTEROL SULFATE 2.5; .5 MG/3ML; MG/3ML
3 SOLUTION RESPIRATORY (INHALATION) EVERY 4 HOURS PRN
Status: DISCONTINUED | OUTPATIENT
Start: 2019-11-23 | End: 2019-12-01 | Stop reason: HOSPADM

## 2019-11-23 RX ORDER — LEVOFLOXACIN 750 MG/1
750 TABLET ORAL EVERY 24 HOURS
Status: DISCONTINUED | OUTPATIENT
Start: 2019-11-23 | End: 2019-11-26

## 2019-11-23 RX ADMIN — LEVOFLOXACIN 750 MG: 750 TABLET, FILM COATED ORAL at 12:06

## 2019-11-23 RX ADMIN — IPRATROPIUM BROMIDE AND ALBUTEROL SULFATE 3 ML: 2.5; .5 SOLUTION RESPIRATORY (INHALATION) at 11:08

## 2019-11-23 RX ADMIN — LINAGLIPTIN 5 MG: 5 TABLET, FILM COATED ORAL at 09:00

## 2019-11-23 RX ADMIN — INSULIN LISPRO 3 UNITS: 100 INJECTION, SOLUTION INTRAVENOUS; SUBCUTANEOUS at 12:06

## 2019-11-23 RX ADMIN — FAMOTIDINE 20 MG: 20 TABLET, FILM COATED ORAL at 17:18

## 2019-11-23 RX ADMIN — ATORVASTATIN CALCIUM 80 MG: 80 TABLET, FILM COATED ORAL at 21:16

## 2019-11-23 RX ADMIN — METFORMIN HYDROCHLORIDE 1000 MG: 1000 TABLET ORAL at 17:18

## 2019-11-23 RX ADMIN — POLYETHYLENE GLYCOL 3350 17 G: 17 POWDER, FOR SOLUTION ORAL at 09:00

## 2019-11-23 RX ADMIN — FUROSEMIDE 40 MG: 40 TABLET ORAL at 09:00

## 2019-11-23 RX ADMIN — FAMOTIDINE 20 MG: 20 TABLET, FILM COATED ORAL at 06:31

## 2019-11-23 RX ADMIN — GABAPENTIN 400 MG: 400 CAPSULE ORAL at 09:00

## 2019-11-23 RX ADMIN — DULOXETINE HYDROCHLORIDE 60 MG: 60 CAPSULE, DELAYED RELEASE ORAL at 21:16

## 2019-11-23 RX ADMIN — CLOPIDOGREL 75 MG: 75 TABLET, FILM COATED ORAL at 09:00

## 2019-11-23 RX ADMIN — INSULIN LISPRO 4 UNITS: 100 INJECTION, SOLUTION INTRAVENOUS; SUBCUTANEOUS at 21:16

## 2019-11-23 RX ADMIN — IPRATROPIUM BROMIDE AND ALBUTEROL SULFATE 3 ML: 2.5; .5 SOLUTION RESPIRATORY (INHALATION) at 15:27

## 2019-11-23 RX ADMIN — ENOXAPARIN SODIUM 40 MG: 40 INJECTION SUBCUTANEOUS at 21:16

## 2019-11-23 RX ADMIN — PREDNISONE 20 MG: 20 TABLET ORAL at 08:59

## 2019-11-23 RX ADMIN — INSULIN LISPRO 2 UNITS: 100 INJECTION, SOLUTION INTRAVENOUS; SUBCUTANEOUS at 06:55

## 2019-11-23 RX ADMIN — INSULIN LISPRO 4 UNITS: 100 INJECTION, SOLUTION INTRAVENOUS; SUBCUTANEOUS at 17:18

## 2019-11-23 RX ADMIN — GABAPENTIN 400 MG: 400 CAPSULE ORAL at 21:16

## 2019-11-23 RX ADMIN — ASPIRIN 81 MG: 81 TABLET, COATED ORAL at 09:00

## 2019-11-23 RX ADMIN — GABAPENTIN 400 MG: 400 CAPSULE ORAL at 17:22

## 2019-11-23 RX ADMIN — METFORMIN HYDROCHLORIDE 1000 MG: 1000 TABLET ORAL at 08:58

## 2019-11-23 RX ADMIN — FUROSEMIDE 40 MG: 40 INJECTION, SOLUTION INTRAMUSCULAR; INTRAVENOUS at 12:54

## 2019-11-23 RX ADMIN — IPRATROPIUM BROMIDE AND ALBUTEROL SULFATE 3 ML: 2.5; .5 SOLUTION RESPIRATORY (INHALATION) at 19:56

## 2019-11-24 ENCOUNTER — APPOINTMENT (OUTPATIENT)
Dept: GENERAL RADIOLOGY | Facility: HOSPITAL | Age: 64
End: 2019-11-24

## 2019-11-24 LAB
ANION GAP SERPL CALCULATED.3IONS-SCNC: 16.2 MMOL/L (ref 5–15)
BASOPHILS # BLD AUTO: 0.02 10*3/MM3 (ref 0–0.2)
BASOPHILS NFR BLD AUTO: 0.2 % (ref 0–1.5)
BUN BLD-MCNC: 17 MG/DL (ref 8–23)
BUN/CREAT SERPL: 15.6 (ref 7–25)
CALCIUM SPEC-SCNC: 8.5 MG/DL (ref 8.6–10.5)
CHLORIDE SERPL-SCNC: 90 MMOL/L (ref 98–107)
CO2 SERPL-SCNC: 25.8 MMOL/L (ref 22–29)
CREAT BLD-MCNC: 1.09 MG/DL (ref 0.76–1.27)
DEPRECATED RDW RBC AUTO: 44.2 FL (ref 37–54)
EOSINOPHIL # BLD AUTO: 0.03 10*3/MM3 (ref 0–0.4)
EOSINOPHIL NFR BLD AUTO: 0.3 % (ref 0.3–6.2)
ERYTHROCYTE [DISTWIDTH] IN BLOOD BY AUTOMATED COUNT: 12.8 % (ref 12.3–15.4)
GFR SERPL CREATININE-BSD FRML MDRD: 68 ML/MIN/1.73
GLUCOSE BLD-MCNC: 305 MG/DL (ref 65–99)
GLUCOSE BLDC GLUCOMTR-MCNC: 226 MG/DL (ref 70–130)
GLUCOSE BLDC GLUCOMTR-MCNC: 246 MG/DL (ref 70–130)
GLUCOSE BLDC GLUCOMTR-MCNC: 251 MG/DL (ref 70–130)
GLUCOSE BLDC GLUCOMTR-MCNC: 269 MG/DL (ref 70–130)
HCT VFR BLD AUTO: 33 % (ref 37.5–51)
HGB BLD-MCNC: 11.4 G/DL (ref 13–17.7)
IMM GRANULOCYTES # BLD AUTO: 0.06 10*3/MM3 (ref 0–0.05)
IMM GRANULOCYTES NFR BLD AUTO: 0.6 % (ref 0–0.5)
LYMPHOCYTES # BLD AUTO: 0.54 10*3/MM3 (ref 0.7–3.1)
LYMPHOCYTES NFR BLD AUTO: 5.5 % (ref 19.6–45.3)
MCH RBC QN AUTO: 32.4 PG (ref 26.6–33)
MCHC RBC AUTO-ENTMCNC: 34.5 G/DL (ref 31.5–35.7)
MCV RBC AUTO: 93.8 FL (ref 79–97)
MONOCYTES # BLD AUTO: 0.49 10*3/MM3 (ref 0.1–0.9)
MONOCYTES NFR BLD AUTO: 5 % (ref 5–12)
NEUTROPHILS # BLD AUTO: 8.63 10*3/MM3 (ref 1.7–7)
NEUTROPHILS NFR BLD AUTO: 88.4 % (ref 42.7–76)
NRBC BLD AUTO-RTO: 0 /100 WBC (ref 0–0.2)
NT-PROBNP SERPL-MCNC: 2966 PG/ML (ref 5–900)
PLATELET # BLD AUTO: 151 10*3/MM3 (ref 140–450)
PMV BLD AUTO: 10.9 FL (ref 6–12)
POTASSIUM BLD-SCNC: 4.3 MMOL/L (ref 3.5–5.2)
PROCALCITONIN SERPL-MCNC: 0.1 NG/ML (ref 0.1–0.25)
RBC # BLD AUTO: 3.52 10*6/MM3 (ref 4.14–5.8)
SODIUM BLD-SCNC: 132 MMOL/L (ref 136–145)
WBC NRBC COR # BLD: 9.77 10*3/MM3 (ref 3.4–10.8)

## 2019-11-24 PROCEDURE — 71046 X-RAY EXAM CHEST 2 VIEWS: CPT

## 2019-11-24 PROCEDURE — 85025 COMPLETE CBC W/AUTO DIFF WBC: CPT | Performed by: INTERNAL MEDICINE

## 2019-11-24 PROCEDURE — 82962 GLUCOSE BLOOD TEST: CPT

## 2019-11-24 PROCEDURE — 94799 UNLISTED PULMONARY SVC/PX: CPT

## 2019-11-24 PROCEDURE — 99231 SBSQ HOSP IP/OBS SF/LOW 25: CPT | Performed by: NURSE PRACTITIONER

## 2019-11-24 PROCEDURE — 80048 BASIC METABOLIC PNL TOTAL CA: CPT | Performed by: INTERNAL MEDICINE

## 2019-11-24 PROCEDURE — 25010000002 FUROSEMIDE PER 20 MG: Performed by: INTERNAL MEDICINE

## 2019-11-24 PROCEDURE — 84145 PROCALCITONIN (PCT): CPT | Performed by: INTERNAL MEDICINE

## 2019-11-24 PROCEDURE — 83880 ASSAY OF NATRIURETIC PEPTIDE: CPT | Performed by: INTERNAL MEDICINE

## 2019-11-24 PROCEDURE — 25010000002 ENOXAPARIN PER 10 MG: Performed by: INTERNAL MEDICINE

## 2019-11-24 PROCEDURE — 63710000001 PREDNISONE PER 1 MG: Performed by: INTERNAL MEDICINE

## 2019-11-24 PROCEDURE — 63710000001 INSULIN LISPRO (HUMAN) PER 5 UNITS: Performed by: INTERNAL MEDICINE

## 2019-11-24 RX ORDER — FUROSEMIDE 10 MG/ML
40 INJECTION INTRAMUSCULAR; INTRAVENOUS ONCE
Status: COMPLETED | OUTPATIENT
Start: 2019-11-24 | End: 2019-11-24

## 2019-11-24 RX ADMIN — IPRATROPIUM BROMIDE AND ALBUTEROL SULFATE 3 ML: 2.5; .5 SOLUTION RESPIRATORY (INHALATION) at 15:03

## 2019-11-24 RX ADMIN — IPRATROPIUM BROMIDE AND ALBUTEROL SULFATE 3 ML: 2.5; .5 SOLUTION RESPIRATORY (INHALATION) at 07:27

## 2019-11-24 RX ADMIN — ACETAMINOPHEN 650 MG: 325 TABLET, FILM COATED ORAL at 21:45

## 2019-11-24 RX ADMIN — LEVOFLOXACIN 750 MG: 750 TABLET, FILM COATED ORAL at 13:00

## 2019-11-24 RX ADMIN — GABAPENTIN 400 MG: 400 CAPSULE ORAL at 20:56

## 2019-11-24 RX ADMIN — CLOPIDOGREL 75 MG: 75 TABLET, FILM COATED ORAL at 09:23

## 2019-11-24 RX ADMIN — ENOXAPARIN SODIUM 40 MG: 40 INJECTION SUBCUTANEOUS at 20:56

## 2019-11-24 RX ADMIN — IPRATROPIUM BROMIDE AND ALBUTEROL SULFATE 3 ML: 2.5; .5 SOLUTION RESPIRATORY (INHALATION) at 04:04

## 2019-11-24 RX ADMIN — DULOXETINE HYDROCHLORIDE 60 MG: 60 CAPSULE, DELAYED RELEASE ORAL at 20:56

## 2019-11-24 RX ADMIN — FUROSEMIDE 40 MG: 40 INJECTION, SOLUTION INTRAMUSCULAR; INTRAVENOUS at 14:57

## 2019-11-24 RX ADMIN — FUROSEMIDE 40 MG: 40 TABLET ORAL at 09:22

## 2019-11-24 RX ADMIN — LINAGLIPTIN 5 MG: 5 TABLET, FILM COATED ORAL at 09:22

## 2019-11-24 RX ADMIN — GABAPENTIN 400 MG: 400 CAPSULE ORAL at 09:23

## 2019-11-24 RX ADMIN — PREDNISONE 20 MG: 20 TABLET ORAL at 09:22

## 2019-11-24 RX ADMIN — INSULIN LISPRO 3 UNITS: 100 INJECTION, SOLUTION INTRAVENOUS; SUBCUTANEOUS at 21:44

## 2019-11-24 RX ADMIN — ATORVASTATIN CALCIUM 80 MG: 80 TABLET, FILM COATED ORAL at 20:56

## 2019-11-24 RX ADMIN — INSULIN LISPRO 4 UNITS: 100 INJECTION, SOLUTION INTRAVENOUS; SUBCUTANEOUS at 07:01

## 2019-11-24 RX ADMIN — ASPIRIN 81 MG: 81 TABLET, COATED ORAL at 09:22

## 2019-11-24 RX ADMIN — INSULIN LISPRO 4 UNITS: 100 INJECTION, SOLUTION INTRAVENOUS; SUBCUTANEOUS at 17:42

## 2019-11-24 RX ADMIN — GABAPENTIN 400 MG: 400 CAPSULE ORAL at 17:41

## 2019-11-24 RX ADMIN — METFORMIN HYDROCHLORIDE 1000 MG: 1000 TABLET ORAL at 09:23

## 2019-11-24 RX ADMIN — FAMOTIDINE 20 MG: 20 TABLET, FILM COATED ORAL at 06:42

## 2019-11-24 RX ADMIN — FAMOTIDINE 20 MG: 20 TABLET, FILM COATED ORAL at 17:42

## 2019-11-24 RX ADMIN — METFORMIN HYDROCHLORIDE 1000 MG: 1000 TABLET ORAL at 17:42

## 2019-11-24 RX ADMIN — INSULIN LISPRO 3 UNITS: 100 INJECTION, SOLUTION INTRAVENOUS; SUBCUTANEOUS at 12:59

## 2019-11-25 LAB
ALBUMIN SERPL-MCNC: 3.5 G/DL (ref 3.5–5.2)
ALBUMIN/GLOB SERPL: 0.9 G/DL
ALP SERPL-CCNC: 93 U/L (ref 39–117)
ALT SERPL W P-5'-P-CCNC: 18 U/L (ref 1–41)
ANION GAP SERPL CALCULATED.3IONS-SCNC: 11.7 MMOL/L (ref 5–15)
AST SERPL-CCNC: 15 U/L (ref 1–40)
BILIRUB SERPL-MCNC: 0.4 MG/DL (ref 0.2–1.2)
BUN BLD-MCNC: 17 MG/DL (ref 8–23)
BUN/CREAT SERPL: 17 (ref 7–25)
CALCIUM SPEC-SCNC: 8.6 MG/DL (ref 8.6–10.5)
CHLORIDE SERPL-SCNC: 95 MMOL/L (ref 98–107)
CO2 SERPL-SCNC: 32.3 MMOL/L (ref 22–29)
CREAT BLD-MCNC: 1 MG/DL (ref 0.76–1.27)
DEPRECATED RDW RBC AUTO: 43.3 FL (ref 37–54)
ERYTHROCYTE [DISTWIDTH] IN BLOOD BY AUTOMATED COUNT: 12.8 % (ref 12.3–15.4)
GFR SERPL CREATININE-BSD FRML MDRD: 75 ML/MIN/1.73
GLOBULIN UR ELPH-MCNC: 3.8 GM/DL
GLUCOSE BLD-MCNC: 188 MG/DL (ref 65–99)
GLUCOSE BLDC GLUCOMTR-MCNC: 166 MG/DL (ref 70–130)
GLUCOSE BLDC GLUCOMTR-MCNC: 186 MG/DL (ref 70–130)
GLUCOSE BLDC GLUCOMTR-MCNC: 194 MG/DL (ref 70–130)
GLUCOSE BLDC GLUCOMTR-MCNC: 206 MG/DL (ref 70–130)
HCT VFR BLD AUTO: 32.7 % (ref 37.5–51)
HGB BLD-MCNC: 11.2 G/DL (ref 13–17.7)
MAGNESIUM SERPL-MCNC: 2 MG/DL (ref 1.6–2.4)
MCH RBC QN AUTO: 32.2 PG (ref 26.6–33)
MCHC RBC AUTO-ENTMCNC: 34.3 G/DL (ref 31.5–35.7)
MCV RBC AUTO: 94 FL (ref 79–97)
PLATELET # BLD AUTO: 193 10*3/MM3 (ref 140–450)
PMV BLD AUTO: 10.7 FL (ref 6–12)
POTASSIUM BLD-SCNC: 4.6 MMOL/L (ref 3.5–5.2)
PROT SERPL-MCNC: 7.3 G/DL (ref 6–8.5)
RBC # BLD AUTO: 3.48 10*6/MM3 (ref 4.14–5.8)
SODIUM BLD-SCNC: 139 MMOL/L (ref 136–145)
WBC NRBC COR # BLD: 9.43 10*3/MM3 (ref 3.4–10.8)

## 2019-11-25 PROCEDURE — 99232 SBSQ HOSP IP/OBS MODERATE 35: CPT | Performed by: NURSE PRACTITIONER

## 2019-11-25 PROCEDURE — 80053 COMPREHEN METABOLIC PANEL: CPT | Performed by: NURSE PRACTITIONER

## 2019-11-25 PROCEDURE — 94799 UNLISTED PULMONARY SVC/PX: CPT

## 2019-11-25 PROCEDURE — 85027 COMPLETE CBC AUTOMATED: CPT | Performed by: INTERNAL MEDICINE

## 2019-11-25 PROCEDURE — 63710000001 PREDNISONE PER 1 MG: Performed by: INTERNAL MEDICINE

## 2019-11-25 PROCEDURE — 63710000001 INSULIN LISPRO (HUMAN) PER 5 UNITS: Performed by: INTERNAL MEDICINE

## 2019-11-25 PROCEDURE — 83735 ASSAY OF MAGNESIUM: CPT | Performed by: INTERNAL MEDICINE

## 2019-11-25 PROCEDURE — 25010000002 ENOXAPARIN PER 10 MG: Performed by: INTERNAL MEDICINE

## 2019-11-25 PROCEDURE — 82962 GLUCOSE BLOOD TEST: CPT

## 2019-11-25 RX ORDER — METOPROLOL SUCCINATE 25 MG/1
25 TABLET, EXTENDED RELEASE ORAL
Status: DISCONTINUED | OUTPATIENT
Start: 2019-11-25 | End: 2019-12-01 | Stop reason: HOSPADM

## 2019-11-25 RX ORDER — ECHINACEA PURPUREA EXTRACT 125 MG
2 TABLET ORAL AS NEEDED
Status: DISCONTINUED | OUTPATIENT
Start: 2019-11-25 | End: 2019-12-01 | Stop reason: HOSPADM

## 2019-11-25 RX ADMIN — METFORMIN HYDROCHLORIDE 1000 MG: 1000 TABLET ORAL at 17:17

## 2019-11-25 RX ADMIN — IPRATROPIUM BROMIDE AND ALBUTEROL SULFATE 3 ML: 2.5; .5 SOLUTION RESPIRATORY (INHALATION) at 20:08

## 2019-11-25 RX ADMIN — INSULIN LISPRO 2 UNITS: 100 INJECTION, SOLUTION INTRAVENOUS; SUBCUTANEOUS at 08:44

## 2019-11-25 RX ADMIN — IPRATROPIUM BROMIDE AND ALBUTEROL SULFATE 3 ML: 2.5; .5 SOLUTION RESPIRATORY (INHALATION) at 14:59

## 2019-11-25 RX ADMIN — IPRATROPIUM BROMIDE AND ALBUTEROL SULFATE 3 ML: 2.5; .5 SOLUTION RESPIRATORY (INHALATION) at 11:26

## 2019-11-25 RX ADMIN — FAMOTIDINE 20 MG: 20 TABLET, FILM COATED ORAL at 17:17

## 2019-11-25 RX ADMIN — METOPROLOL SUCCINATE 25 MG: 25 TABLET, FILM COATED, EXTENDED RELEASE ORAL at 11:45

## 2019-11-25 RX ADMIN — PREDNISONE 20 MG: 20 TABLET ORAL at 08:45

## 2019-11-25 RX ADMIN — GABAPENTIN 400 MG: 400 CAPSULE ORAL at 17:17

## 2019-11-25 RX ADMIN — ASPIRIN 81 MG: 81 TABLET, COATED ORAL at 08:45

## 2019-11-25 RX ADMIN — FUROSEMIDE 40 MG: 40 TABLET ORAL at 08:45

## 2019-11-25 RX ADMIN — GABAPENTIN 400 MG: 400 CAPSULE ORAL at 21:11

## 2019-11-25 RX ADMIN — GABAPENTIN 400 MG: 400 CAPSULE ORAL at 08:47

## 2019-11-25 RX ADMIN — ENOXAPARIN SODIUM 40 MG: 40 INJECTION SUBCUTANEOUS at 21:11

## 2019-11-25 RX ADMIN — INSULIN LISPRO 2 UNITS: 100 INJECTION, SOLUTION INTRAVENOUS; SUBCUTANEOUS at 21:11

## 2019-11-25 RX ADMIN — LINAGLIPTIN 5 MG: 5 TABLET, FILM COATED ORAL at 08:45

## 2019-11-25 RX ADMIN — ATORVASTATIN CALCIUM 80 MG: 80 TABLET, FILM COATED ORAL at 21:11

## 2019-11-25 RX ADMIN — IPRATROPIUM BROMIDE AND ALBUTEROL SULFATE 3 ML: 2.5; .5 SOLUTION RESPIRATORY (INHALATION) at 09:04

## 2019-11-25 RX ADMIN — DULOXETINE HYDROCHLORIDE 60 MG: 60 CAPSULE, DELAYED RELEASE ORAL at 21:11

## 2019-11-25 RX ADMIN — METFORMIN HYDROCHLORIDE 1000 MG: 1000 TABLET ORAL at 08:45

## 2019-11-25 RX ADMIN — FAMOTIDINE 20 MG: 20 TABLET, FILM COATED ORAL at 06:55

## 2019-11-25 RX ADMIN — LEVOFLOXACIN 750 MG: 750 TABLET, FILM COATED ORAL at 11:45

## 2019-11-25 RX ADMIN — INSULIN LISPRO 3 UNITS: 100 INJECTION, SOLUTION INTRAVENOUS; SUBCUTANEOUS at 11:45

## 2019-11-25 RX ADMIN — INSULIN LISPRO 2 UNITS: 100 INJECTION, SOLUTION INTRAVENOUS; SUBCUTANEOUS at 17:17

## 2019-11-25 RX ADMIN — CLOPIDOGREL 75 MG: 75 TABLET, FILM COATED ORAL at 08:45

## 2019-11-26 LAB
GLUCOSE BLDC GLUCOMTR-MCNC: 151 MG/DL (ref 70–130)
GLUCOSE BLDC GLUCOMTR-MCNC: 166 MG/DL (ref 70–130)
GLUCOSE BLDC GLUCOMTR-MCNC: 184 MG/DL (ref 70–130)
GLUCOSE BLDC GLUCOMTR-MCNC: 212 MG/DL (ref 70–130)

## 2019-11-26 PROCEDURE — 94760 N-INVAS EAR/PLS OXIMETRY 1: CPT

## 2019-11-26 PROCEDURE — 25010000002 ENOXAPARIN PER 10 MG: Performed by: INTERNAL MEDICINE

## 2019-11-26 PROCEDURE — 94799 UNLISTED PULMONARY SVC/PX: CPT

## 2019-11-26 PROCEDURE — 63710000001 INSULIN LISPRO (HUMAN) PER 5 UNITS: Performed by: INTERNAL MEDICINE

## 2019-11-26 PROCEDURE — 99232 SBSQ HOSP IP/OBS MODERATE 35: CPT | Performed by: INTERNAL MEDICINE

## 2019-11-26 PROCEDURE — 82962 GLUCOSE BLOOD TEST: CPT

## 2019-11-26 PROCEDURE — 94618 PULMONARY STRESS TESTING: CPT

## 2019-11-26 PROCEDURE — 63710000001 PREDNISONE PER 1 MG: Performed by: INTERNAL MEDICINE

## 2019-11-26 RX ORDER — DIPHENOXYLATE HYDROCHLORIDE AND ATROPINE SULFATE 2.5; .025 MG/1; MG/1
1 TABLET ORAL
Status: DISCONTINUED | OUTPATIENT
Start: 2019-11-26 | End: 2019-12-01 | Stop reason: HOSPADM

## 2019-11-26 RX ORDER — FUROSEMIDE 20 MG/1
20 TABLET ORAL DAILY
Status: DISCONTINUED | OUTPATIENT
Start: 2019-11-27 | End: 2019-12-01 | Stop reason: HOSPADM

## 2019-11-26 RX ADMIN — FAMOTIDINE 20 MG: 20 TABLET, FILM COATED ORAL at 06:50

## 2019-11-26 RX ADMIN — DIPHENOXYLATE HYDROCHLORIDE AND ATROPINE SULFATE 1 TABLET: 2.5; .025 TABLET ORAL at 21:02

## 2019-11-26 RX ADMIN — ENOXAPARIN SODIUM 40 MG: 40 INJECTION SUBCUTANEOUS at 21:01

## 2019-11-26 RX ADMIN — GABAPENTIN 400 MG: 400 CAPSULE ORAL at 15:42

## 2019-11-26 RX ADMIN — LEVOFLOXACIN 750 MG: 750 TABLET, FILM COATED ORAL at 11:48

## 2019-11-26 RX ADMIN — PREDNISONE 20 MG: 20 TABLET ORAL at 08:19

## 2019-11-26 RX ADMIN — INSULIN LISPRO 3 UNITS: 100 INJECTION, SOLUTION INTRAVENOUS; SUBCUTANEOUS at 17:06

## 2019-11-26 RX ADMIN — CLOPIDOGREL 75 MG: 75 TABLET, FILM COATED ORAL at 08:19

## 2019-11-26 RX ADMIN — INSULIN LISPRO 2 UNITS: 100 INJECTION, SOLUTION INTRAVENOUS; SUBCUTANEOUS at 21:01

## 2019-11-26 RX ADMIN — METOPROLOL SUCCINATE 25 MG: 25 TABLET, FILM COATED, EXTENDED RELEASE ORAL at 08:19

## 2019-11-26 RX ADMIN — IPRATROPIUM BROMIDE AND ALBUTEROL SULFATE 3 ML: 2.5; .5 SOLUTION RESPIRATORY (INHALATION) at 20:56

## 2019-11-26 RX ADMIN — DULOXETINE HYDROCHLORIDE 60 MG: 60 CAPSULE, DELAYED RELEASE ORAL at 21:02

## 2019-11-26 RX ADMIN — FUROSEMIDE 40 MG: 40 TABLET ORAL at 08:19

## 2019-11-26 RX ADMIN — INSULIN LISPRO 2 UNITS: 100 INJECTION, SOLUTION INTRAVENOUS; SUBCUTANEOUS at 11:48

## 2019-11-26 RX ADMIN — INSULIN LISPRO 2 UNITS: 100 INJECTION, SOLUTION INTRAVENOUS; SUBCUTANEOUS at 08:19

## 2019-11-26 RX ADMIN — ASPIRIN 81 MG: 81 TABLET, COATED ORAL at 08:19

## 2019-11-26 RX ADMIN — IPRATROPIUM BROMIDE AND ALBUTEROL SULFATE 3 ML: 2.5; .5 SOLUTION RESPIRATORY (INHALATION) at 11:32

## 2019-11-26 RX ADMIN — METFORMIN HYDROCHLORIDE 1000 MG: 1000 TABLET ORAL at 08:19

## 2019-11-26 RX ADMIN — GABAPENTIN 400 MG: 400 CAPSULE ORAL at 08:19

## 2019-11-26 RX ADMIN — FAMOTIDINE 20 MG: 20 TABLET, FILM COATED ORAL at 17:06

## 2019-11-26 RX ADMIN — METFORMIN HYDROCHLORIDE 1000 MG: 1000 TABLET ORAL at 17:06

## 2019-11-26 RX ADMIN — ATORVASTATIN CALCIUM 80 MG: 80 TABLET, FILM COATED ORAL at 21:02

## 2019-11-26 RX ADMIN — LINAGLIPTIN 5 MG: 5 TABLET, FILM COATED ORAL at 08:19

## 2019-11-26 RX ADMIN — GABAPENTIN 400 MG: 400 CAPSULE ORAL at 21:02

## 2019-11-26 RX ADMIN — IPRATROPIUM BROMIDE AND ALBUTEROL SULFATE 3 ML: 2.5; .5 SOLUTION RESPIRATORY (INHALATION) at 15:21

## 2019-11-27 ENCOUNTER — APPOINTMENT (OUTPATIENT)
Dept: GENERAL RADIOLOGY | Facility: HOSPITAL | Age: 64
End: 2019-11-27

## 2019-11-27 LAB
ANION GAP SERPL CALCULATED.3IONS-SCNC: 11.8 MMOL/L (ref 5–15)
BUN BLD-MCNC: 16 MG/DL (ref 8–23)
BUN/CREAT SERPL: 17.6 (ref 7–25)
CALCIUM SPEC-SCNC: 8.4 MG/DL (ref 8.6–10.5)
CHLORIDE SERPL-SCNC: 97 MMOL/L (ref 98–107)
CO2 SERPL-SCNC: 28.2 MMOL/L (ref 22–29)
CREAT BLD-MCNC: 0.91 MG/DL (ref 0.76–1.27)
DEPRECATED RDW RBC AUTO: 42.6 FL (ref 37–54)
ERYTHROCYTE [DISTWIDTH] IN BLOOD BY AUTOMATED COUNT: 12.7 % (ref 12.3–15.4)
GFR SERPL CREATININE-BSD FRML MDRD: 84 ML/MIN/1.73
GLUCOSE BLD-MCNC: 159 MG/DL (ref 65–99)
GLUCOSE BLDC GLUCOMTR-MCNC: 131 MG/DL (ref 70–130)
GLUCOSE BLDC GLUCOMTR-MCNC: 146 MG/DL (ref 70–130)
GLUCOSE BLDC GLUCOMTR-MCNC: 159 MG/DL (ref 70–130)
GLUCOSE BLDC GLUCOMTR-MCNC: 176 MG/DL (ref 70–130)
HCT VFR BLD AUTO: 33.7 % (ref 37.5–51)
HGB BLD-MCNC: 11.8 G/DL (ref 13–17.7)
MCH RBC QN AUTO: 32.6 PG (ref 26.6–33)
MCHC RBC AUTO-ENTMCNC: 35 G/DL (ref 31.5–35.7)
MCV RBC AUTO: 93.1 FL (ref 79–97)
PLATELET # BLD AUTO: 185 10*3/MM3 (ref 140–450)
PMV BLD AUTO: 10.8 FL (ref 6–12)
POTASSIUM BLD-SCNC: 4.4 MMOL/L (ref 3.5–5.2)
RBC # BLD AUTO: 3.62 10*6/MM3 (ref 4.14–5.8)
SODIUM BLD-SCNC: 137 MMOL/L (ref 136–145)
WBC NRBC COR # BLD: 8.47 10*3/MM3 (ref 3.4–10.8)

## 2019-11-27 PROCEDURE — 94799 UNLISTED PULMONARY SVC/PX: CPT

## 2019-11-27 PROCEDURE — 82962 GLUCOSE BLOOD TEST: CPT

## 2019-11-27 PROCEDURE — 74018 RADEX ABDOMEN 1 VIEW: CPT

## 2019-11-27 PROCEDURE — 80048 BASIC METABOLIC PNL TOTAL CA: CPT | Performed by: INTERNAL MEDICINE

## 2019-11-27 PROCEDURE — 99232 SBSQ HOSP IP/OBS MODERATE 35: CPT | Performed by: NURSE PRACTITIONER

## 2019-11-27 PROCEDURE — 71046 X-RAY EXAM CHEST 2 VIEWS: CPT

## 2019-11-27 PROCEDURE — 63710000001 INSULIN LISPRO (HUMAN) PER 5 UNITS: Performed by: INTERNAL MEDICINE

## 2019-11-27 PROCEDURE — 25010000002 ENOXAPARIN PER 10 MG: Performed by: INTERNAL MEDICINE

## 2019-11-27 PROCEDURE — 85027 COMPLETE CBC AUTOMATED: CPT | Performed by: INTERNAL MEDICINE

## 2019-11-27 RX ADMIN — IPRATROPIUM BROMIDE AND ALBUTEROL SULFATE 3 ML: 2.5; .5 SOLUTION RESPIRATORY (INHALATION) at 07:23

## 2019-11-27 RX ADMIN — DIPHENOXYLATE HYDROCHLORIDE AND ATROPINE SULFATE 1 TABLET: 2.5; .025 TABLET ORAL at 20:56

## 2019-11-27 RX ADMIN — INSULIN LISPRO 2 UNITS: 100 INJECTION, SOLUTION INTRAVENOUS; SUBCUTANEOUS at 12:00

## 2019-11-27 RX ADMIN — METOPROLOL SUCCINATE 25 MG: 25 TABLET, FILM COATED, EXTENDED RELEASE ORAL at 08:03

## 2019-11-27 RX ADMIN — GABAPENTIN 400 MG: 400 CAPSULE ORAL at 20:56

## 2019-11-27 RX ADMIN — FUROSEMIDE 20 MG: 20 TABLET ORAL at 08:02

## 2019-11-27 RX ADMIN — DULOXETINE HYDROCHLORIDE 60 MG: 60 CAPSULE, DELAYED RELEASE ORAL at 20:56

## 2019-11-27 RX ADMIN — FAMOTIDINE 20 MG: 20 TABLET, FILM COATED ORAL at 06:42

## 2019-11-27 RX ADMIN — FAMOTIDINE 20 MG: 20 TABLET, FILM COATED ORAL at 17:49

## 2019-11-27 RX ADMIN — ATORVASTATIN CALCIUM 80 MG: 80 TABLET, FILM COATED ORAL at 20:56

## 2019-11-27 RX ADMIN — IPRATROPIUM BROMIDE AND ALBUTEROL SULFATE 3 ML: 2.5; .5 SOLUTION RESPIRATORY (INHALATION) at 16:39

## 2019-11-27 RX ADMIN — INSULIN LISPRO 2 UNITS: 100 INJECTION, SOLUTION INTRAVENOUS; SUBCUTANEOUS at 08:02

## 2019-11-27 RX ADMIN — CLOPIDOGREL 75 MG: 75 TABLET, FILM COATED ORAL at 08:02

## 2019-11-27 RX ADMIN — GABAPENTIN 400 MG: 400 CAPSULE ORAL at 17:49

## 2019-11-27 RX ADMIN — IPRATROPIUM BROMIDE AND ALBUTEROL SULFATE 3 ML: 2.5; .5 SOLUTION RESPIRATORY (INHALATION) at 19:24

## 2019-11-27 RX ADMIN — METFORMIN HYDROCHLORIDE 1000 MG: 1000 TABLET ORAL at 08:02

## 2019-11-27 RX ADMIN — ASPIRIN 81 MG: 81 TABLET, COATED ORAL at 08:02

## 2019-11-27 RX ADMIN — METFORMIN HYDROCHLORIDE 1000 MG: 1000 TABLET ORAL at 17:49

## 2019-11-27 RX ADMIN — LINAGLIPTIN 5 MG: 5 TABLET, FILM COATED ORAL at 08:02

## 2019-11-27 RX ADMIN — ENOXAPARIN SODIUM 40 MG: 40 INJECTION SUBCUTANEOUS at 20:56

## 2019-11-27 RX ADMIN — GABAPENTIN 400 MG: 400 CAPSULE ORAL at 08:02

## 2019-11-27 RX ADMIN — IPRATROPIUM BROMIDE AND ALBUTEROL SULFATE 3 ML: 2.5; .5 SOLUTION RESPIRATORY (INHALATION) at 10:55

## 2019-11-28 LAB
GLUCOSE BLDC GLUCOMTR-MCNC: 111 MG/DL (ref 70–130)
GLUCOSE BLDC GLUCOMTR-MCNC: 133 MG/DL (ref 70–130)
GLUCOSE BLDC GLUCOMTR-MCNC: 158 MG/DL (ref 70–130)
GLUCOSE BLDC GLUCOMTR-MCNC: 86 MG/DL (ref 70–130)

## 2019-11-28 PROCEDURE — 94799 UNLISTED PULMONARY SVC/PX: CPT

## 2019-11-28 PROCEDURE — 25010000002 ENOXAPARIN PER 10 MG: Performed by: INTERNAL MEDICINE

## 2019-11-28 PROCEDURE — 63710000001 INSULIN LISPRO (HUMAN) PER 5 UNITS: Performed by: INTERNAL MEDICINE

## 2019-11-28 PROCEDURE — 99232 SBSQ HOSP IP/OBS MODERATE 35: CPT | Performed by: INTERNAL MEDICINE

## 2019-11-28 PROCEDURE — 82962 GLUCOSE BLOOD TEST: CPT

## 2019-11-28 RX ORDER — ARFORMOTEROL TARTRATE 15 UG/2ML
15 SOLUTION RESPIRATORY (INHALATION)
Status: DISCONTINUED | OUTPATIENT
Start: 2019-11-28 | End: 2019-12-01 | Stop reason: HOSPADM

## 2019-11-28 RX ORDER — ZOLPIDEM TARTRATE 5 MG/1
TABLET ORAL
Qty: 2 TABLET | Refills: 0 | Status: SHIPPED | OUTPATIENT
Start: 2019-11-28

## 2019-11-28 RX ADMIN — GABAPENTIN 400 MG: 400 CAPSULE ORAL at 08:42

## 2019-11-28 RX ADMIN — CLOPIDOGREL 75 MG: 75 TABLET, FILM COATED ORAL at 08:42

## 2019-11-28 RX ADMIN — LINAGLIPTIN 5 MG: 5 TABLET, FILM COATED ORAL at 08:42

## 2019-11-28 RX ADMIN — IPRATROPIUM BROMIDE AND ALBUTEROL SULFATE 3 ML: 2.5; .5 SOLUTION RESPIRATORY (INHALATION) at 14:43

## 2019-11-28 RX ADMIN — IPRATROPIUM BROMIDE AND ALBUTEROL SULFATE 3 ML: 2.5; .5 SOLUTION RESPIRATORY (INHALATION) at 08:31

## 2019-11-28 RX ADMIN — GABAPENTIN 400 MG: 400 CAPSULE ORAL at 20:44

## 2019-11-28 RX ADMIN — GABAPENTIN 400 MG: 400 CAPSULE ORAL at 15:37

## 2019-11-28 RX ADMIN — FUROSEMIDE 20 MG: 20 TABLET ORAL at 08:42

## 2019-11-28 RX ADMIN — FAMOTIDINE 20 MG: 20 TABLET, FILM COATED ORAL at 06:48

## 2019-11-28 RX ADMIN — DULOXETINE HYDROCHLORIDE 60 MG: 60 CAPSULE, DELAYED RELEASE ORAL at 20:44

## 2019-11-28 RX ADMIN — ASPIRIN 81 MG: 81 TABLET, COATED ORAL at 08:42

## 2019-11-28 RX ADMIN — METFORMIN HYDROCHLORIDE 1000 MG: 1000 TABLET ORAL at 08:42

## 2019-11-28 RX ADMIN — FAMOTIDINE 20 MG: 20 TABLET, FILM COATED ORAL at 17:14

## 2019-11-28 RX ADMIN — METFORMIN HYDROCHLORIDE 1000 MG: 1000 TABLET ORAL at 17:14

## 2019-11-28 RX ADMIN — ATORVASTATIN CALCIUM 80 MG: 80 TABLET, FILM COATED ORAL at 20:44

## 2019-11-28 RX ADMIN — INSULIN LISPRO 2 UNITS: 100 INJECTION, SOLUTION INTRAVENOUS; SUBCUTANEOUS at 17:14

## 2019-11-28 RX ADMIN — ARFORMOTEROL TARTRATE 15 MCG: 15 SOLUTION RESPIRATORY (INHALATION) at 20:20

## 2019-11-28 RX ADMIN — ENOXAPARIN SODIUM 40 MG: 40 INJECTION SUBCUTANEOUS at 20:44

## 2019-11-29 ENCOUNTER — APPOINTMENT (OUTPATIENT)
Dept: CARDIOLOGY | Facility: HOSPITAL | Age: 64
End: 2019-11-29

## 2019-11-29 ENCOUNTER — APPOINTMENT (OUTPATIENT)
Dept: GENERAL RADIOLOGY | Facility: HOSPITAL | Age: 64
End: 2019-11-29

## 2019-11-29 ENCOUNTER — APPOINTMENT (OUTPATIENT)
Dept: CT IMAGING | Facility: HOSPITAL | Age: 64
End: 2019-11-29

## 2019-11-29 LAB
ANION GAP SERPL CALCULATED.3IONS-SCNC: 11 MMOL/L (ref 5–15)
AORTIC DIMENSIONLESS INDEX: 0.6 (DI)
BASOPHILS # BLD AUTO: 0.06 10*3/MM3 (ref 0–0.2)
BASOPHILS NFR BLD AUTO: 0.5 % (ref 0–1.5)
BH CV ECHO MEAS - ACS: 1.9 CM
BH CV ECHO MEAS - AO MAX PG (FULL): 3.6 MMHG
BH CV ECHO MEAS - AO MAX PG: 6.1 MMHG
BH CV ECHO MEAS - AO MEAN PG (FULL): 2 MMHG
BH CV ECHO MEAS - AO MEAN PG: 3 MMHG
BH CV ECHO MEAS - AO ROOT AREA (BSA CORRECTED): 1.6
BH CV ECHO MEAS - AO ROOT AREA: 7.5 CM^2
BH CV ECHO MEAS - AO ROOT DIAM: 3.1 CM
BH CV ECHO MEAS - AO V2 MAX: 123 CM/SEC
BH CV ECHO MEAS - AO V2 MEAN: 85.9 CM/SEC
BH CV ECHO MEAS - AO V2 VTI: 21.5 CM
BH CV ECHO MEAS - AVA(I,A): 2.1 CM^2
BH CV ECHO MEAS - AVA(I,D): 2.1 CM^2
BH CV ECHO MEAS - AVA(V,A): 2.2 CM^2
BH CV ECHO MEAS - AVA(V,D): 2.2 CM^2
BH CV ECHO MEAS - BSA(HAYCOCK): 2 M^2
BH CV ECHO MEAS - BSA: 2 M^2
BH CV ECHO MEAS - BZI_BMI: 27 KILOGRAMS/M^2
BH CV ECHO MEAS - BZI_METRIC_HEIGHT: 175.3 CM
BH CV ECHO MEAS - BZI_METRIC_WEIGHT: 83 KG
BH CV ECHO MEAS - EDV(CUBED): 97.3 ML
BH CV ECHO MEAS - EDV(MOD-SP2): 91 ML
BH CV ECHO MEAS - EDV(MOD-SP4): 80 ML
BH CV ECHO MEAS - EDV(TEICH): 97.3 ML
BH CV ECHO MEAS - EF(CUBED): 39.1 %
BH CV ECHO MEAS - EF(MOD-BP): 41 %
BH CV ECHO MEAS - EF(MOD-SP2): 39.6 %
BH CV ECHO MEAS - EF(MOD-SP4): 42.5 %
BH CV ECHO MEAS - EF(TEICH): 32.3 %
BH CV ECHO MEAS - ESV(CUBED): 59.3 ML
BH CV ECHO MEAS - ESV(MOD-SP2): 55 ML
BH CV ECHO MEAS - ESV(MOD-SP4): 46 ML
BH CV ECHO MEAS - ESV(TEICH): 65.9 ML
BH CV ECHO MEAS - FS: 15.2 %
BH CV ECHO MEAS - IVS/LVPW: 1.1
BH CV ECHO MEAS - IVSD: 1.2 CM
BH CV ECHO MEAS - LAT PEAK E' VEL: 10.9 CM/SEC
BH CV ECHO MEAS - LV DIASTOLIC VOL/BSA (35-75): 40.2 ML/M^2
BH CV ECHO MEAS - LV MASS(C)D: 192.9 GRAMS
BH CV ECHO MEAS - LV MASS(C)DI: 97 GRAMS/M^2
BH CV ECHO MEAS - LV MAX PG: 2.5 MMHG
BH CV ECHO MEAS - LV MEAN PG: 1 MMHG
BH CV ECHO MEAS - LV SYSTOLIC VOL/BSA (12-30): 23.1 ML/M^2
BH CV ECHO MEAS - LV V1 MAX: 78.3 CM/SEC
BH CV ECHO MEAS - LV V1 MEAN: 55.8 CM/SEC
BH CV ECHO MEAS - LV V1 VTI: 13.2 CM
BH CV ECHO MEAS - LVIDD: 4.6 CM
BH CV ECHO MEAS - LVIDS: 3.9 CM
BH CV ECHO MEAS - LVLD AP2: 7.9 CM
BH CV ECHO MEAS - LVLD AP4: 7.7 CM
BH CV ECHO MEAS - LVLS AP2: 7.2 CM
BH CV ECHO MEAS - LVLS AP4: 6.9 CM
BH CV ECHO MEAS - LVOT AREA (M): 3.5 CM^2
BH CV ECHO MEAS - LVOT AREA: 3.5 CM^2
BH CV ECHO MEAS - LVOT DIAM: 2.1 CM
BH CV ECHO MEAS - LVPWD: 1.1 CM
BH CV ECHO MEAS - MED PEAK E' VEL: 5.8 CM/SEC
BH CV ECHO MEAS - MR MAX PG: 45.4 MMHG
BH CV ECHO MEAS - MR MAX VEL: 337 CM/SEC
BH CV ECHO MEAS - MV A DUR: 0.11 SEC
BH CV ECHO MEAS - MV A MAX VEL: 91.7 CM/SEC
BH CV ECHO MEAS - MV DEC SLOPE: 579 CM/SEC^2
BH CV ECHO MEAS - MV DEC TIME: 121 SEC
BH CV ECHO MEAS - MV E MAX VEL: 81 CM/SEC
BH CV ECHO MEAS - MV E/A: 0.88
BH CV ECHO MEAS - MV MAX PG: 3.5 MMHG
BH CV ECHO MEAS - MV MEAN PG: 2 MMHG
BH CV ECHO MEAS - MV P1/2T MAX VEL: 107 CM/SEC
BH CV ECHO MEAS - MV P1/2T: 54.1 MSEC
BH CV ECHO MEAS - MV V2 MAX: 93.3 CM/SEC
BH CV ECHO MEAS - MV V2 MEAN: 65.6 CM/SEC
BH CV ECHO MEAS - MV V2 VTI: 18.7 CM
BH CV ECHO MEAS - MVA P1/2T LCG: 2.1 CM^2
BH CV ECHO MEAS - MVA(P1/2T): 4.1 CM^2
BH CV ECHO MEAS - MVA(VTI): 2.4 CM^2
BH CV ECHO MEAS - PA ACC TIME: 0.07 SEC
BH CV ECHO MEAS - PA MAX PG (FULL): 2.6 MMHG
BH CV ECHO MEAS - PA MAX PG: 6.3 MMHG
BH CV ECHO MEAS - PA PR(ACCEL): 45.7 MMHG
BH CV ECHO MEAS - PA V2 MAX: 125 CM/SEC
BH CV ECHO MEAS - PI END-D VEL: 111 CM/SEC
BH CV ECHO MEAS - PULM A REVS DUR: 0.13 SEC
BH CV ECHO MEAS - PULM A REVS VEL: 37.2 CM/SEC
BH CV ECHO MEAS - PULM DIAS VEL: 47.3 CM/SEC
BH CV ECHO MEAS - PULM S/D: 1.2
BH CV ECHO MEAS - PULM SYS VEL: 58.3 CM/SEC
BH CV ECHO MEAS - PVA(V,A): 2.7 CM^2
BH CV ECHO MEAS - PVA(V,D): 2.7 CM^2
BH CV ECHO MEAS - QP/QS: 1.3
BH CV ECHO MEAS - RAP SYSTOLE: 8 MMHG
BH CV ECHO MEAS - RV MAX PG: 3.7 MMHG
BH CV ECHO MEAS - RV MEAN PG: 2 MMHG
BH CV ECHO MEAS - RV V1 MAX: 96.1 CM/SEC
BH CV ECHO MEAS - RV V1 MEAN: 64.3 CM/SEC
BH CV ECHO MEAS - RV V1 VTI: 17.6 CM
BH CV ECHO MEAS - RVOT AREA: 3.5 CM^2
BH CV ECHO MEAS - RVOT DIAM: 2.1 CM
BH CV ECHO MEAS - RVSP: 30 MMHG
BH CV ECHO MEAS - SI(AO): 81.6 ML/M^2
BH CV ECHO MEAS - SI(CUBED): 19.1 ML/M^2
BH CV ECHO MEAS - SI(LVOT): 23 ML/M^2
BH CV ECHO MEAS - SI(MOD-SP2): 18.1 ML/M^2
BH CV ECHO MEAS - SI(MOD-SP4): 17.1 ML/M^2
BH CV ECHO MEAS - SI(TEICH): 15.8 ML/M^2
BH CV ECHO MEAS - SV(AO): 162.3 ML
BH CV ECHO MEAS - SV(CUBED): 38 ML
BH CV ECHO MEAS - SV(LVOT): 45.7 ML
BH CV ECHO MEAS - SV(MOD-SP2): 36 ML
BH CV ECHO MEAS - SV(MOD-SP4): 34 ML
BH CV ECHO MEAS - SV(RVOT): 61 ML
BH CV ECHO MEAS - SV(TEICH): 31.4 ML
BH CV ECHO MEAS - TAPSE (>1.6): 1.8 CM
BH CV ECHO MEAS - TR MAX PG: 22
BH CV ECHO MEAS - TR MAX VEL: 235 CM/SEC
BH CV ECHO MEASUREMENTS AVERAGE E/E' RATIO: 9.7
BH CV XLRA - RV BASE: 3.4 CM
BH CV XLRA - TDI S': 8.6 CM/SEC
BUN BLD-MCNC: 15 MG/DL (ref 8–23)
BUN/CREAT SERPL: 15.6 (ref 7–25)
CALCIUM SPEC-SCNC: 8.2 MG/DL (ref 8.6–10.5)
CHLORIDE SERPL-SCNC: 98 MMOL/L (ref 98–107)
CO2 SERPL-SCNC: 30 MMOL/L (ref 22–29)
CREAT BLD-MCNC: 0.96 MG/DL (ref 0.76–1.27)
DEPRECATED RDW RBC AUTO: 43.3 FL (ref 37–54)
EOSINOPHIL # BLD AUTO: 0.12 10*3/MM3 (ref 0–0.4)
EOSINOPHIL NFR BLD AUTO: 1.1 % (ref 0.3–6.2)
ERYTHROCYTE [DISTWIDTH] IN BLOOD BY AUTOMATED COUNT: 13 % (ref 12.3–15.4)
GFR SERPL CREATININE-BSD FRML MDRD: 79 ML/MIN/1.73
GLUCOSE BLD-MCNC: 141 MG/DL (ref 65–99)
GLUCOSE BLDC GLUCOMTR-MCNC: 116 MG/DL (ref 70–130)
GLUCOSE BLDC GLUCOMTR-MCNC: 129 MG/DL (ref 70–130)
GLUCOSE BLDC GLUCOMTR-MCNC: 139 MG/DL (ref 70–130)
GLUCOSE BLDC GLUCOMTR-MCNC: 158 MG/DL (ref 70–130)
HCT VFR BLD AUTO: 38 % (ref 37.5–51)
HGB BLD-MCNC: 13.3 G/DL (ref 13–17.7)
IMM GRANULOCYTES # BLD AUTO: 0.09 10*3/MM3 (ref 0–0.05)
IMM GRANULOCYTES NFR BLD AUTO: 0.8 % (ref 0–0.5)
LEFT ATRIUM VOLUME INDEX: 28.8 ML/M2
LV EF 2D ECHO EST: 41 %
LYMPHOCYTES # BLD AUTO: 1.5 10*3/MM3 (ref 0.7–3.1)
LYMPHOCYTES NFR BLD AUTO: 13.3 % (ref 19.6–45.3)
MCH RBC QN AUTO: 32.4 PG (ref 26.6–33)
MCHC RBC AUTO-ENTMCNC: 35 G/DL (ref 31.5–35.7)
MCV RBC AUTO: 92.7 FL (ref 79–97)
MONOCYTES # BLD AUTO: 0.82 10*3/MM3 (ref 0.1–0.9)
MONOCYTES NFR BLD AUTO: 7.3 % (ref 5–12)
NEUTROPHILS # BLD AUTO: 8.71 10*3/MM3 (ref 1.7–7)
NEUTROPHILS NFR BLD AUTO: 77 % (ref 42.7–76)
NRBC BLD AUTO-RTO: 0 /100 WBC (ref 0–0.2)
NT-PROBNP SERPL-MCNC: 2930 PG/ML (ref 5–900)
PLATELET # BLD AUTO: 226 10*3/MM3 (ref 140–450)
PMV BLD AUTO: 10.5 FL (ref 6–12)
POTASSIUM BLD-SCNC: 4.3 MMOL/L (ref 3.5–5.2)
RBC # BLD AUTO: 4.1 10*6/MM3 (ref 4.14–5.8)
SODIUM BLD-SCNC: 139 MMOL/L (ref 136–145)
TROPONIN T SERPL-MCNC: 0.59 NG/ML (ref 0–0.03)
TROPONIN T SERPL-MCNC: 0.6 NG/ML (ref 0–0.03)
WBC NRBC COR # BLD: 11.3 10*3/MM3 (ref 3.4–10.8)

## 2019-11-29 PROCEDURE — 71045 X-RAY EXAM CHEST 1 VIEW: CPT

## 2019-11-29 PROCEDURE — 94799 UNLISTED PULMONARY SVC/PX: CPT

## 2019-11-29 PROCEDURE — 85025 COMPLETE CBC W/AUTO DIFF WBC: CPT | Performed by: INTERNAL MEDICINE

## 2019-11-29 PROCEDURE — 80048 BASIC METABOLIC PNL TOTAL CA: CPT | Performed by: INTERNAL MEDICINE

## 2019-11-29 PROCEDURE — 93010 ELECTROCARDIOGRAM REPORT: CPT | Performed by: INTERNAL MEDICINE

## 2019-11-29 PROCEDURE — 25010000002 PERFLUTREN (DEFINITY) 8.476 MG IN SODIUM CHLORIDE 0.9 % 10 ML INJECTION: Performed by: INTERNAL MEDICINE

## 2019-11-29 PROCEDURE — 70498 CT ANGIOGRAPHY NECK: CPT

## 2019-11-29 PROCEDURE — 82962 GLUCOSE BLOOD TEST: CPT

## 2019-11-29 PROCEDURE — 83880 ASSAY OF NATRIURETIC PEPTIDE: CPT | Performed by: INTERNAL MEDICINE

## 2019-11-29 PROCEDURE — 84484 ASSAY OF TROPONIN QUANT: CPT | Performed by: INTERNAL MEDICINE

## 2019-11-29 PROCEDURE — 25010000002 IOPAMIDOL 61 % SOLUTION: Performed by: INTERNAL MEDICINE

## 2019-11-29 PROCEDURE — 93306 TTE W/DOPPLER COMPLETE: CPT | Performed by: INTERNAL MEDICINE

## 2019-11-29 PROCEDURE — 25010000002 ENOXAPARIN PER 10 MG: Performed by: INTERNAL MEDICINE

## 2019-11-29 PROCEDURE — 25010000002 ONDANSETRON PER 1 MG: Performed by: INTERNAL MEDICINE

## 2019-11-29 PROCEDURE — 99221 1ST HOSP IP/OBS SF/LOW 40: CPT | Performed by: PSYCHIATRY & NEUROLOGY

## 2019-11-29 PROCEDURE — 93005 ELECTROCARDIOGRAM TRACING: CPT | Performed by: INTERNAL MEDICINE

## 2019-11-29 PROCEDURE — 70496 CT ANGIOGRAPHY HEAD: CPT

## 2019-11-29 PROCEDURE — 99233 SBSQ HOSP IP/OBS HIGH 50: CPT | Performed by: INTERNAL MEDICINE

## 2019-11-29 PROCEDURE — 93306 TTE W/DOPPLER COMPLETE: CPT

## 2019-11-29 RX ADMIN — FUROSEMIDE 20 MG: 20 TABLET ORAL at 08:10

## 2019-11-29 RX ADMIN — ENOXAPARIN SODIUM 40 MG: 40 INJECTION SUBCUTANEOUS at 21:36

## 2019-11-29 RX ADMIN — METFORMIN HYDROCHLORIDE 1000 MG: 1000 TABLET ORAL at 08:10

## 2019-11-29 RX ADMIN — GABAPENTIN 400 MG: 400 CAPSULE ORAL at 08:10

## 2019-11-29 RX ADMIN — IOPAMIDOL 100 ML: 612 INJECTION, SOLUTION INTRAVENOUS at 16:45

## 2019-11-29 RX ADMIN — CLOPIDOGREL 75 MG: 75 TABLET, FILM COATED ORAL at 08:10

## 2019-11-29 RX ADMIN — LINAGLIPTIN 5 MG: 5 TABLET, FILM COATED ORAL at 08:10

## 2019-11-29 RX ADMIN — FAMOTIDINE 20 MG: 20 TABLET, FILM COATED ORAL at 08:10

## 2019-11-29 RX ADMIN — ONDANSETRON 4 MG: 2 INJECTION INTRAMUSCULAR; INTRAVENOUS at 13:30

## 2019-11-29 RX ADMIN — ATORVASTATIN CALCIUM 80 MG: 80 TABLET, FILM COATED ORAL at 21:36

## 2019-11-29 RX ADMIN — PERFLUTREN 3 ML: 6.52 INJECTION, SUSPENSION INTRAVENOUS at 15:15

## 2019-11-29 RX ADMIN — FAMOTIDINE 20 MG: 20 TABLET, FILM COATED ORAL at 16:43

## 2019-11-29 RX ADMIN — DULOXETINE HYDROCHLORIDE 60 MG: 60 CAPSULE, DELAYED RELEASE ORAL at 21:36

## 2019-11-29 RX ADMIN — GABAPENTIN 400 MG: 400 CAPSULE ORAL at 21:36

## 2019-11-29 RX ADMIN — GABAPENTIN 400 MG: 400 CAPSULE ORAL at 16:43

## 2019-11-29 RX ADMIN — ASPIRIN 81 MG: 81 TABLET, COATED ORAL at 08:10

## 2019-11-29 RX ADMIN — ARFORMOTEROL TARTRATE 15 MCG: 15 SOLUTION RESPIRATORY (INHALATION) at 21:06

## 2019-11-30 LAB
ANION GAP SERPL CALCULATED.3IONS-SCNC: 12.4 MMOL/L (ref 5–15)
BASOPHILS # BLD AUTO: 0.03 10*3/MM3 (ref 0–0.2)
BASOPHILS NFR BLD AUTO: 0.3 % (ref 0–1.5)
BUN BLD-MCNC: 13 MG/DL (ref 8–23)
BUN/CREAT SERPL: 15.9 (ref 7–25)
CALCIUM SPEC-SCNC: 8.2 MG/DL (ref 8.6–10.5)
CHLORIDE SERPL-SCNC: 99 MMOL/L (ref 98–107)
CO2 SERPL-SCNC: 28.6 MMOL/L (ref 22–29)
CREAT BLD-MCNC: 0.82 MG/DL (ref 0.76–1.27)
DEPRECATED RDW RBC AUTO: 44.2 FL (ref 37–54)
EOSINOPHIL # BLD AUTO: 0.11 10*3/MM3 (ref 0–0.4)
EOSINOPHIL NFR BLD AUTO: 1.3 % (ref 0.3–6.2)
ERYTHROCYTE [DISTWIDTH] IN BLOOD BY AUTOMATED COUNT: 13 % (ref 12.3–15.4)
GFR SERPL CREATININE-BSD FRML MDRD: 94 ML/MIN/1.73
GLUCOSE BLD-MCNC: 169 MG/DL (ref 65–99)
GLUCOSE BLDC GLUCOMTR-MCNC: 146 MG/DL (ref 70–130)
GLUCOSE BLDC GLUCOMTR-MCNC: 151 MG/DL (ref 70–130)
GLUCOSE BLDC GLUCOMTR-MCNC: 155 MG/DL (ref 70–130)
GLUCOSE BLDC GLUCOMTR-MCNC: 216 MG/DL (ref 70–130)
HCT VFR BLD AUTO: 36.3 % (ref 37.5–51)
HGB BLD-MCNC: 12 G/DL (ref 13–17.7)
IMM GRANULOCYTES # BLD AUTO: 0.05 10*3/MM3 (ref 0–0.05)
IMM GRANULOCYTES NFR BLD AUTO: 0.6 % (ref 0–0.5)
LYMPHOCYTES # BLD AUTO: 1.25 10*3/MM3 (ref 0.7–3.1)
LYMPHOCYTES NFR BLD AUTO: 14.3 % (ref 19.6–45.3)
MCH RBC QN AUTO: 30.7 PG (ref 26.6–33)
MCHC RBC AUTO-ENTMCNC: 33.1 G/DL (ref 31.5–35.7)
MCV RBC AUTO: 92.8 FL (ref 79–97)
MONOCYTES # BLD AUTO: 0.61 10*3/MM3 (ref 0.1–0.9)
MONOCYTES NFR BLD AUTO: 7 % (ref 5–12)
NEUTROPHILS # BLD AUTO: 6.69 10*3/MM3 (ref 1.7–7)
NEUTROPHILS NFR BLD AUTO: 76.5 % (ref 42.7–76)
NRBC BLD AUTO-RTO: 0 /100 WBC (ref 0–0.2)
PLATELET # BLD AUTO: 169 10*3/MM3 (ref 140–450)
PMV BLD AUTO: 10.4 FL (ref 6–12)
POTASSIUM BLD-SCNC: 4 MMOL/L (ref 3.5–5.2)
RBC # BLD AUTO: 3.91 10*6/MM3 (ref 4.14–5.8)
SODIUM BLD-SCNC: 140 MMOL/L (ref 136–145)
WBC NRBC COR # BLD: 8.74 10*3/MM3 (ref 3.4–10.8)

## 2019-11-30 PROCEDURE — 25010000002 ENOXAPARIN PER 10 MG: Performed by: INTERNAL MEDICINE

## 2019-11-30 PROCEDURE — 80048 BASIC METABOLIC PNL TOTAL CA: CPT | Performed by: INTERNAL MEDICINE

## 2019-11-30 PROCEDURE — 85025 COMPLETE CBC W/AUTO DIFF WBC: CPT | Performed by: INTERNAL MEDICINE

## 2019-11-30 PROCEDURE — 82962 GLUCOSE BLOOD TEST: CPT

## 2019-11-30 PROCEDURE — 99232 SBSQ HOSP IP/OBS MODERATE 35: CPT | Performed by: NURSE PRACTITIONER

## 2019-11-30 RX ORDER — CHOLECALCIFEROL (VITAMIN D3) 125 MCG
1000 CAPSULE ORAL DAILY
Status: DISCONTINUED | OUTPATIENT
Start: 2019-11-30 | End: 2019-12-01 | Stop reason: HOSPADM

## 2019-11-30 RX ADMIN — ENOXAPARIN SODIUM 40 MG: 40 INJECTION SUBCUTANEOUS at 20:12

## 2019-11-30 RX ADMIN — ASPIRIN 81 MG: 81 TABLET, COATED ORAL at 08:19

## 2019-11-30 RX ADMIN — ATORVASTATIN CALCIUM 80 MG: 80 TABLET, FILM COATED ORAL at 20:11

## 2019-11-30 RX ADMIN — LINAGLIPTIN 5 MG: 5 TABLET, FILM COATED ORAL at 12:34

## 2019-11-30 RX ADMIN — CLOPIDOGREL 75 MG: 75 TABLET, FILM COATED ORAL at 08:19

## 2019-11-30 RX ADMIN — FAMOTIDINE 20 MG: 20 TABLET, FILM COATED ORAL at 08:19

## 2019-11-30 RX ADMIN — GABAPENTIN 400 MG: 400 CAPSULE ORAL at 20:11

## 2019-11-30 RX ADMIN — DULOXETINE HYDROCHLORIDE 60 MG: 60 CAPSULE, DELAYED RELEASE ORAL at 20:11

## 2019-11-30 RX ADMIN — FUROSEMIDE 20 MG: 20 TABLET ORAL at 08:19

## 2019-11-30 RX ADMIN — GABAPENTIN 400 MG: 400 CAPSULE ORAL at 16:41

## 2019-11-30 RX ADMIN — HYDROCODONE BITARTRATE AND ACETAMINOPHEN 1 TABLET: 5; 325 TABLET ORAL at 20:15

## 2019-11-30 RX ADMIN — FAMOTIDINE 20 MG: 20 TABLET, FILM COATED ORAL at 16:41

## 2019-11-30 RX ADMIN — GABAPENTIN 400 MG: 400 CAPSULE ORAL at 08:19

## 2019-11-30 RX ADMIN — Medication 1000 MCG: at 12:35

## 2019-12-01 VITALS
WEIGHT: 182.7 LBS | TEMPERATURE: 97.6 F | HEIGHT: 69 IN | HEART RATE: 90 BPM | RESPIRATION RATE: 16 BRPM | BODY MASS INDEX: 27.06 KG/M2 | OXYGEN SATURATION: 99 % | SYSTOLIC BLOOD PRESSURE: 94 MMHG | DIASTOLIC BLOOD PRESSURE: 59 MMHG

## 2019-12-01 LAB
GLUCOSE BLDC GLUCOMTR-MCNC: 139 MG/DL (ref 70–130)
GLUCOSE BLDC GLUCOMTR-MCNC: 160 MG/DL (ref 70–130)

## 2019-12-01 PROCEDURE — 94799 UNLISTED PULMONARY SVC/PX: CPT

## 2019-12-01 PROCEDURE — 99232 SBSQ HOSP IP/OBS MODERATE 35: CPT | Performed by: NURSE PRACTITIONER

## 2019-12-01 PROCEDURE — 63710000001 INSULIN LISPRO (HUMAN) PER 5 UNITS: Performed by: INTERNAL MEDICINE

## 2019-12-01 PROCEDURE — 82962 GLUCOSE BLOOD TEST: CPT

## 2019-12-01 RX ORDER — FUROSEMIDE 20 MG/1
20 TABLET ORAL DAILY
Start: 2019-12-02

## 2019-12-01 RX ORDER — ASPIRIN 81 MG/1
81 TABLET ORAL DAILY
Start: 2019-12-02

## 2019-12-01 RX ORDER — METOPROLOL SUCCINATE 25 MG/1
25 TABLET, EXTENDED RELEASE ORAL
Start: 2019-12-02

## 2019-12-01 RX ORDER — ACETAMINOPHEN 325 MG/1
650 TABLET ORAL EVERY 4 HOURS PRN
Start: 2019-12-01

## 2019-12-01 RX ADMIN — METOPROLOL SUCCINATE 25 MG: 25 TABLET, FILM COATED, EXTENDED RELEASE ORAL at 08:26

## 2019-12-01 RX ADMIN — FUROSEMIDE 20 MG: 20 TABLET ORAL at 08:23

## 2019-12-01 RX ADMIN — ASPIRIN 81 MG: 81 TABLET, COATED ORAL at 08:24

## 2019-12-01 RX ADMIN — INSULIN LISPRO 2 UNITS: 100 INJECTION, SOLUTION INTRAVENOUS; SUBCUTANEOUS at 08:24

## 2019-12-01 RX ADMIN — CLOPIDOGREL 75 MG: 75 TABLET, FILM COATED ORAL at 08:24

## 2019-12-01 RX ADMIN — FAMOTIDINE 20 MG: 20 TABLET, FILM COATED ORAL at 08:24

## 2019-12-01 RX ADMIN — GABAPENTIN 400 MG: 400 CAPSULE ORAL at 08:23

## 2019-12-01 RX ADMIN — LINAGLIPTIN 5 MG: 5 TABLET, FILM COATED ORAL at 08:24

## 2019-12-01 RX ADMIN — Medication 1000 MCG: at 08:23

## 2019-12-11 PROBLEM — I25.5 ISCHEMIC CARDIOMYOPATHY: Status: ACTIVE | Noted: 2019-12-11

## 2019-12-17 ENCOUNTER — LAB REQUISITION (OUTPATIENT)
Dept: LAB | Facility: HOSPITAL | Age: 64
End: 2019-12-17

## 2019-12-17 ENCOUNTER — DOCUMENTATION (OUTPATIENT)
Dept: CARDIOLOGY | Facility: CLINIC | Age: 64
End: 2019-12-17

## 2019-12-17 DIAGNOSIS — Z00.00 ROUTINE GENERAL MEDICAL EXAMINATION AT A HEALTH CARE FACILITY: ICD-10-CM

## 2019-12-17 LAB
ANION GAP SERPL CALCULATED.3IONS-SCNC: 14.8 MMOL/L (ref 5–15)
BASOPHILS # BLD AUTO: 0.03 10*3/MM3 (ref 0–0.2)
BASOPHILS NFR BLD AUTO: 0.4 % (ref 0–1.5)
BUN BLD-MCNC: 12 MG/DL (ref 8–23)
BUN/CREAT SERPL: 14.1 (ref 7–25)
CALCIUM SPEC-SCNC: 9.1 MG/DL (ref 8.6–10.5)
CHLORIDE SERPL-SCNC: 97 MMOL/L (ref 98–107)
CO2 SERPL-SCNC: 27.2 MMOL/L (ref 22–29)
CREAT BLD-MCNC: 0.85 MG/DL (ref 0.76–1.27)
DEPRECATED RDW RBC AUTO: 46.9 FL (ref 37–54)
EOSINOPHIL # BLD AUTO: 0.21 10*3/MM3 (ref 0–0.4)
EOSINOPHIL NFR BLD AUTO: 2.8 % (ref 0.3–6.2)
ERYTHROCYTE [DISTWIDTH] IN BLOOD BY AUTOMATED COUNT: 13.8 % (ref 12.3–15.4)
GFR SERPL CREATININE-BSD FRML MDRD: 90 ML/MIN/1.73
GLUCOSE BLD-MCNC: 113 MG/DL (ref 65–99)
HCT VFR BLD AUTO: 38.5 % (ref 37.5–51)
HGB BLD-MCNC: 12.8 G/DL (ref 13–17.7)
IMM GRANULOCYTES # BLD AUTO: 0.03 10*3/MM3 (ref 0–0.05)
IMM GRANULOCYTES NFR BLD AUTO: 0.4 % (ref 0–0.5)
LYMPHOCYTES # BLD AUTO: 1.58 10*3/MM3 (ref 0.7–3.1)
LYMPHOCYTES NFR BLD AUTO: 21.1 % (ref 19.6–45.3)
MCH RBC QN AUTO: 31 PG (ref 26.6–33)
MCHC RBC AUTO-ENTMCNC: 33.2 G/DL (ref 31.5–35.7)
MCV RBC AUTO: 93.2 FL (ref 79–97)
MONOCYTES # BLD AUTO: 0.7 10*3/MM3 (ref 0.1–0.9)
MONOCYTES NFR BLD AUTO: 9.3 % (ref 5–12)
NEUTROPHILS # BLD AUTO: 4.94 10*3/MM3 (ref 1.7–7)
NEUTROPHILS NFR BLD AUTO: 66 % (ref 42.7–76)
NRBC BLD AUTO-RTO: 0 /100 WBC (ref 0–0.2)
PLATELET # BLD AUTO: 150 10*3/MM3 (ref 140–450)
PMV BLD AUTO: 11.1 FL (ref 6–12)
POTASSIUM BLD-SCNC: 4.5 MMOL/L (ref 3.5–5.2)
RBC # BLD AUTO: 4.13 10*6/MM3 (ref 4.14–5.8)
SODIUM BLD-SCNC: 139 MMOL/L (ref 136–145)
WBC NRBC COR # BLD: 7.49 10*3/MM3 (ref 3.4–10.8)

## 2019-12-17 PROCEDURE — 85025 COMPLETE CBC W/AUTO DIFF WBC: CPT

## 2019-12-17 PROCEDURE — 80048 BASIC METABOLIC PNL TOTAL CA: CPT

## 2019-12-23 ENCOUNTER — DOCUMENTATION (OUTPATIENT)
Dept: CARDIOLOGY | Facility: CLINIC | Age: 64
End: 2019-12-23

## 2019-12-23 NOTE — PROGRESS NOTES
"           Expand All Collapse All      Show:Clear all  [x]Manual[x]Template[x]Copied    Added by:  [x]Nurys Dixon APRN    []Jose for details           Patient Name: Ramiro Maradiaga  :1955  64 y.o.   Date of service 19     Patient Care Team:  Heidi Jalloh APRN as PCP - General (Nurse Practitioner)     Chief Complaint: f/u NSTEMI,  CHF, CAD     Interval History:  Feeling better,  Still SOA, still requiring oxygen.  Denies chest pain.     c/o abdominal bloating        Objective      Vital Signs  Temp:  [97.5 °F (36.4 °C)-97.9 °F (36.6 °C)] 97.5 °F (36.4 °C)  Heart Rate:  [] 98  Resp:  [14] 14  BP: ()/(64-78) 102/70     Intake/Output Summary (Last 24 hours) at 2019 1052  Last data filed at 2019 0633  Gross per 24 hour   Intake 600 ml   Output 1925 ml   Net -1325 ml          Flowsheet Rows      First Filed Value   Admission Height  175.3 cm (69\") Documented at 2019 0449   Admission Weight  85 kg (187 lb 6.3 oz) Documented at 2019 2242             Physical Exam:           General Appearance:    Alert, cooperative, in no acute distress   Lungs:     Faint crackles in bases  Normal respiratory effort and rate.      Heart:    Regular rhythm and normal rate, normal S1 and S2, no murmurs, gallops or rubs.     Chest Wall:    No abnormalities observed   Abdomen:     Soft, mild distention, positive bowel sounds.     Extremities:   no cyanosis, clubbing.  Minimal pedal edema.  No marked joint deformities.  Adequate musculoskeletal strength.        Results Review:         Results from last 7 days   Lab Units 19  0517   SODIUM mmol/L 138   POTASSIUM mmol/L 4.2   CHLORIDE mmol/L 94*   CO2 mmol/L 33.8*   BUN mg/dL 13   CREATININE mg/dL 0.86   GLUCOSE mg/dL 263*   CALCIUM mg/dL 7.9*            Results from last 7 days   Lab Units 19  0524 19  2255   TROPONIN T ng/mL 0.837* 0.839*           Results from last 7 days   Lab Units 19  0517   WBC 10*3/mm3 " "6.39   HEMOGLOBIN g/dL 11.7*   HEMATOCRIT % 34.1*   PLATELETS 10*3/mm3 147              Results from last 7 days   Lab Units 11/18/19  1400 11/18/19  0603 11/17/19 2009 11/17/19  1224   INR    --   --   --  1.12*   APTT seconds 56.9* 59.3* 36.7* 26.1                         Medication Review:      aspirin 81 mg Oral Daily   atorvastatin 80 mg Oral Nightly   clopidogrel 75 mg Oral Daily   DULoxetine 60 mg Oral Nightly   famotidine 20 mg Oral BID AC   furosemide 40 mg Oral Daily   gabapentin 400 mg Oral TID   influenza vaccine 0.5 mL Intramuscular Once   insulin lispro 0-7 Units Subcutaneous 4x Daily With Meals & Nightly   linagliptin 5 mg Oral Daily   metFORMIN 1,000 mg Oral BID With Meals   polyethylene glycol 17 g Oral Daily               Assessment/Plan      1. NSTEMI.  Status post drug eluting stent placement of mid and distal SVG to RCA.  Also with severe, small vessel coronary artery disease. Denies angina     2. Coronary artery disease.  Cath on 11/18 with 2/3 patent grafts and severe, small vessel coronary artery disease.  Status post PCI as above. Medical management of remaining disease.  Denies angina     3.  Acute hypoxic respiratory failure.   On 7 liters high flow this morning.   Breathing easier, states his breathing still feels \"Tight\" and requested a nebulizer treatment.  Reported to nursing staff. He is not using accessory muscles.  Faint crackles in bases.       4.  Acute systolic CHF.  Elevated left ventricular filling pressures noted on cath.  Diuresed well with IV furosemide with decline in weight.   Now on PO lasix. Minimal pedal edema.     5.  Cardiomyopathy.  EF of 40%.  New compared with echo in 10/2019.       6.  Poorly controlled diabetes mellitus type 2     7.  History of prior stroke     8.  Hallucinations.  Resolved     9.  Elevated liver enzymes. Improving.     10. Abdominal bloating.  Bowel regimen started per GI. Mildly bloated, passing a \"lot of gas\", feels better.     BP still too " low for medical management of cardiomyopathy with ACE/ARB and BB.     LEONEL Anaya  [facility]  11/21/19  10:52 AM

## 2020-02-17 ENCOUNTER — HOSPITAL ENCOUNTER (OUTPATIENT)
Dept: SLEEP MEDICINE | Facility: HOSPITAL | Age: 65
Discharge: HOME | End: 2020-02-17

## 2020-02-25 ENCOUNTER — OFFICE VISIT CONVERTED (OUTPATIENT)
Dept: CARDIOLOGY | Facility: CLINIC | Age: 65
End: 2020-02-25
Attending: SPECIALIST

## 2020-02-25 ENCOUNTER — CONVERSION ENCOUNTER (OUTPATIENT)
Dept: CARDIOLOGY | Facility: CLINIC | Age: 65
End: 2020-02-25

## 2020-06-24 ENCOUNTER — CONVERSION ENCOUNTER (OUTPATIENT)
Dept: UROLOGY | Facility: CLINIC | Age: 65
End: 2020-06-24

## 2020-06-24 ENCOUNTER — OFFICE VISIT CONVERTED (OUTPATIENT)
Dept: UROLOGY | Facility: CLINIC | Age: 65
End: 2020-06-24
Attending: NURSE PRACTITIONER

## 2020-06-24 ENCOUNTER — HOSPITAL ENCOUNTER (OUTPATIENT)
Dept: UROLOGY | Facility: CLINIC | Age: 65
Discharge: HOME | End: 2020-06-24
Attending: NURSE PRACTITIONER

## 2020-06-24 LAB
ALBUMIN SERPL-MCNC: 4.1 G/DL (ref 3.5–5)
ALBUMIN/GLOB SERPL: 1.3 {RATIO} (ref 1.4–2.6)
ALP SERPL-CCNC: 115 U/L (ref 56–155)
ALT SERPL-CCNC: 16 U/L (ref 10–40)
ANION GAP SERPL CALC-SCNC: 30 MMOL/L (ref 8–19)
AST SERPL-CCNC: 18 U/L (ref 15–50)
BILIRUB SERPL-MCNC: 0.65 MG/DL (ref 0.2–1.3)
BUN SERPL-MCNC: 11 MG/DL (ref 5–25)
BUN/CREAT SERPL: 10 {RATIO} (ref 6–20)
CALCIUM SERPL-MCNC: 9.6 MG/DL (ref 8.7–10.4)
CHLORIDE SERPL-SCNC: 96 MMOL/L (ref 99–111)
CONV CO2: 19 MMOL/L (ref 22–32)
CONV TOTAL PROTEIN: 7.2 G/DL (ref 6.3–8.2)
CREAT UR-MCNC: 1.14 MG/DL (ref 0.7–1.2)
GFR SERPLBLD BASED ON 1.73 SQ M-ARVRAT: >60 ML/MIN/{1.73_M2}
GLOBULIN UR ELPH-MCNC: 3.1 G/DL (ref 2–3.5)
GLUCOSE SERPL-MCNC: 214 MG/DL (ref 70–99)
OSMOLALITY SERPL CALC.SUM OF ELEC: 298 MOSM/KG (ref 273–304)
POTASSIUM SERPL-SCNC: 3.8 MMOL/L (ref 3.5–5.3)
PSA SERPL-MCNC: 0.83 NG/ML (ref 0–4)
SODIUM SERPL-SCNC: 141 MMOL/L (ref 135–147)

## 2020-09-15 ENCOUNTER — OFFICE VISIT CONVERTED (OUTPATIENT)
Dept: CARDIOLOGY | Facility: CLINIC | Age: 65
End: 2020-09-15
Attending: SPECIALIST

## 2021-05-10 NOTE — H&P
"   History and Physical      Patient Name: Ramiro Maradiaga   Patient ID: [id number]   Sex: Male   YOB: 1955    Primary Care Provider: Heidi SULLIVAN   Referring Provider: Rene Cintron MD    Visit Date: June 24, 2020    Provider: LEONEL Borrego   Location: [facility]   Location Address: [address] [place] [address], [address]  [place], [place]  [id number]   Location Phone: [phone]          Chief Complaint  · recent episode urinary retention  · no urge to urinate regularly      History Of Present Illness  The patient is a 65 year old /White male presents in office, referral by Johnathon Gambino MD to be evaluated for recent acute urinary retention. He is accompanied by his daughter with whom he lives with. Patient was taken to the [facility] 6-17-20 per daughter concern due to patient complaint abdominal swelling and pain. Patient was straight cath'd and documented as 1300cc urine emptied. CT abd/pelvis afterward was WNL. Urine was also normal with 2+ glucose. Patient was prescribed Tamsulosin and pain medication. Patient has multiple medical issues and wonders why his abdomen buldges out on both sides. Self reports that he has fibromyalgia and chronic pain. Denies dysuria or gross hematuria. No significant problems voiding just does not feel need to void very often. Denies any urinary incontinence, frequency or urgency. Saw Dr Rodriguez urologist many years ago and told prostate was ok, must had a mild infection. No history of prostate surgery or prostate cancer. Daughter relayed patient's spouse passed away and he now resides with her. Patient history of MI and CVA in the past year. Believes his Previous CVA was related to complication with a neck injection stating \"they had to stop procedure and send to the hospital\" Patient has significant diabetes that is uncontrolled.       Past Medical History  Arthritis; Chronic Pain, Other; Closed fracture of proximal end of left humerus with " routine healing, unspecified fracture morphology, subsequent encounter; Congestive heart failure; Depression (emotion); Diabetes; Heart Attack; Heart Disease; High blood pressure; Hyperlipidemia; Reflux; Stroke; Ulcer         Past Surgical History  Back surgery; Cholecstectomy; Colonoscopy; EGD; Eye Implant; EYE SURGERY; heart surgery; Sinus Surgery; Tumor removal         Medication List  aspirin 81 mg oral tablet,chewable; atorvastatin 80 mg oral tablet; clopidogrel 75 mg oral tablet; cyclobenzaprine 10 mg oral tablet; duloxetine 60 mg oral capsule,delayed release(DR/EC); gabapentin 400 mg oral capsule; metformin 1,000 mg oral tablet; Miralax 17 gram/dose oral powder; oxycodone-acetaminophen 7.5-325 mg oral tablet; tamsulosin 0.4 mg oral capsule; Vitamin D3 5,000 unit oral tablet         Allergy List  amitriptyline; Latex; PENICILLINS; Shellfish allergy         Family Medical History  Asthma; Depression; Stroke; Heart Disease; Hypertension; - No Family History of Colorectal Cancer; Arthrtis; Family history of certain chronic disabling diseases; arthritis         Social History  Alcohol Use; lives alone; Recreational Drug Use (Never); Retired.; Tobacco (Never);          Review of Systems  · Constitutional  o Denies  o : fever, headache, chills  · Eyes  o Denies  o : eye pain, double vision, blurred vision  · HENT  o Denies  o : sinus problems, sore throat, ear infection  · Cardiovascular  o Denies  o : chest pain,   · Respiratory  o Denies  o : shortness of breath, wheezing, frequent cough  · Gastrointestinal  o Admits  o : decreased gastric motility  o Denies  o : nausea, vomiting, heartburn, indigestion,   · Genitourinary  o Denies  o : urgency, frequency, painful urination  · Integument  o Denies  o : rash, itching, boils  · Neurologic  o Denies  o : tingling or numbness, tremors, dizzy spells  · Musculoskeletal  o Admits  o : intermittent pain back, side, neck   o Denies  o : joint pain,  "  · Endocrine  o Denies  o : cold intolerance, heat intolerance,  · Psychiatric  o Admits  o : feels satisfied with life  o Denies  o : severe depression, concerns with hurting themselves  · Heme-Lymph  o Denies  o : swollen glands, blood clotting problems  · Allergic-Immunologic  o Denies  o : sinus allergy symptoms, hay fever      Vitals  Date Time BP Position Site L\R Cuff Size HR RR TEMP (F) WT  HT  BMI kg/m2 BSA m2 O2 Sat        06/24/2020 11:11 /72 Sitting    110 - R  98 199lbs 0oz 5'  10\" 28.55 2.11           Physical Examination  · Constitutional  o Appearance  o : well developed, well-nourished, no obvious deformities present  · Respiratory  o Respiratory Effort  o : breathing unlabored  o Inspection of Chest  o : normal appearance, no retractions  o Auscultation of Lungs  o : normal breath sounds throughout  · Cardiovascular  o Heart  o :   § Auscultation of Heart  § : regular rate and rhythm, no murmurs, gallops or rubs  · Gastrointestinal  o Abdominal Examination  o : obese abdomen nontender to palpation, normal bowel sounds, tone normal without rigidity or guarding, no masses present, abdomen scaphoid upon supine  o Liver and spleen  o : no abnormalities  o Hernias  o : ventral hernia upper leftabdomen   · Genitourinary  o Bladder  o : no abnormalities  o Penis  o : no abnormalities, uncircumcised  o Urethral Meatus  o : no abnormalities  o Scrotum and Scrotal Contents  o :   § Scrotum  § : no abnormalities  § Epididymides  § : no abnormalities  § Testes  § : no abnormalities  o Digital Rectal Examination  o :   § Prostate  § : nontender to palpation, size 15+g no nodules present, consistency normal  · Lymphatic  o Groin  o : no lymphadenopathy present, normal size  · Skin and Subcutaneous Tissue  o General Inspection  o : no lesions present, no areas of discoloration, skin turgor normal, texture normal  · Neurologic  o Mental Status Examination  o : oriented to person, place and time  o Gait " and Station  o : normal gait, able to stand without difficulty  · Psychiatric  o Mood and Affect  o : mood normal, affect appropriate      Figure 1.0: Pain Rating Scale-John         Results  · In-Office Procedures  o Lab procedure  § Automated dipstick urinalysis with microscopy (80797)   § RBC UrnS Qn HPF: 0   § WBC UrnS Qn HPF: 0   § Bacteria UrnS Qn HPF: 0   § Crystals UrnS Qn HPF: 0   § Epithelial Cells (non renal): 0 /HPF  § Epithelial Cells (renal): 0   § Color Ur: Dark yellow   § Clarity Ur: Other   § Glucose Ur Ql Strip: 500 mg/dL   § Sp Gr Ur Qn: 1.025   § Hgb Ur Ql Strip: Trace-Intact   § Prot Ur Ql Strip: 100 mg/dL   § Nitrite Ur Ql Strip: Negative   § WBC Est Ur Ql Strip: Negative   § Bilirub Ur Ql Strip: Negative   § Ketones Ur Ql Strip: Negative   § pH Ur-LsCnc: 6.0   § Urobilinogen Ur Strip-mCnc: 0.2 E.U./dL       Assessment  · Bladder Outlet Obstruction     596.0/N32.0  · Incomplete Bladder Emptying     788.21/R33.9  · Prostate Cancer Screening     V76.44/Z12.5  · Uncontrolled diabetes mellitus     250.02/E11.65  · BPH (benign prostatic hyperplasia)     600.00/N40.0  · History of MI (myocardial infarction)     412/I25.2  · History of CVA (cerebrovascular accident) without residual deficits     V12.54/Z86.73    Problems Reconciled  Plan  · Orders  o CMP (comprehensive metabolic panel) (27942) - 250.02/E11.65 - 06/24/2020  o PSA Ultrasensitive, ANNUAL SCREENING [facility] (07966, ) - V76.44/Z12.5 - 06/24/2020  o Bladder Scan/Residual Urine (42467) - 596.0/N32.0, 788.21/R33.9 - 06/24/2020   127cc  o Venipuncture (18221) - V76.44/Z12.5 - 06/24/2020  · Medications  o finasteride 5 mg oral tablet   SIG: take 1 tablet (5 mg) by oral route once daily for 30 days   DISP: (30) tablets with 6 refills  Prescribed on 06/24/2020     o tamsulosin 0.4 mg oral capsule   SIG: take 1 capsule (0.4 mg) by oral route once daily 1/2 hour following the same evening meal each day   DISP: (30) capsule with 6 refills  Adjusted  on 06/24/2020     o Medications have been Reconciled  o Transition of Care or Provider Policy     patient with only 127 cc post void. to continue Tamsulosin in evening and will add finasteride. patient has significant issue with his diabetes and looking at previous notes, patient was to be on 2 medications for diabetes but he is only taking metformin. I advised daughter to check back with pcp and pharmacy to be sure that patient is not missing medication. glucose in urine today so he is not hypoglycemic but complains of feeling light headed when he stands up and woozy. slightly diaphoretic. He has not eaten but has consumed koolaid with sugar. Which is his main source of hydration through out the day. I explained that he should stop consuming sugared beverages with his poorly controlled diabetes. also advised daughter to discuss blood pressure and possible orthostatic hypotension with pcp. His furosemide is on hold yet his b/p is 103/72. will have patient return for cysto uroflow when dr Candelaria able to perform. His post void residual indicates that it bladder is functioning and probably retention no related to a neurogenic bladder. psa cmp drawn. to check blood sugars at home at least bid and record.    ct and labs reviewed             Electronically Signed by: Gina South MA -Author on June 24, 2020 02:53:08 PM

## 2021-05-13 NOTE — PROGRESS NOTES
"   Progress Note      Patient Name: Ramiro Maradiaga   Patient ID: [id number]   Sex: Male   YOB: 1955    Primary Care Provider: Heidi SULLIVAN   Referring Provider: Rene Cintron MD    Visit Date: September 15, 2020    Provider: Aly Peralta MD   Location[facility]   Location Address: [address] [place] [address], [facility]   [place], [place]  [id number]   Location Phone: [phone]          Chief Complaint     Coronary artery disease.       History Of Present Illness  Ramiro Maradiaga is a 65 year old /White male with a history of coronary artery disease status post coronary artery bypass graft surgery with a fixed defect of the inferior wall with no significant ischemia. No further chest pain.   CURRENT MEDICATIONS: Aspirin 81 mg daily; Cymbalta 60 mg daily; gabapentin 400 mg t.i.d.; furosemide 20 mg daily; metformin 500 mg q.i.d.; Plavix 75 mg daily.   PAST MEDICAL HISTORY: Arthritis; CVA; Coronary artery disease status post CABG; Depression; Diabetes mellitus; Fibromyalgia; Hyperlipidemia; Hypertension.   FAMILY HISTORY: Positive for diabetes mellitus. Negative for hypertension or heart disease.   PSYCHOSOCIAL HISTORY: Denies alcohol or tobacco use. Admits mood changes and depression.       Review of Systems  · Cardiovascular  o Admits  o : shortness of breath while walking or lying flat, chest pain or angina pectoris   o Denies  o : palpitations (fast, fluttering, or skipping beats), swelling (feet, ankles, hands)  · Respiratory  o Denies  o : chronic or frequent cough, asthma or wheezing      Vitals  Date Time BP Position Site L\R Cuff Size HR RR TEMP (F) WT  HT  BMI kg/m2 BSA m2 O2 Sat        09/15/2020 01:58 /78 Sitting    96 - R   200lbs 0oz 5'  9\" 29.53 2.1           Physical Examination  · Constitutional  o Appearance  o : Awake, alert, cooperative, pleasant.  · Respiratory  o Inspection of Chest  o : No chest wall deformities, moving equal.  o Auscultation of " Lungs  o : Good air entry with vesicular breath sounds.  · Cardiovascular  o Heart  o :   § Auscultation of Heart  § : S1 and S2 regular. No S3. No S4.   o Peripheral Vascular System  o :   § Extremities  § : Peripheral pulses were well felt. No edema. No cyanosis.  · Gastrointestinal  o Abdominal Examination  o : No masses or organomegaly noted.          Assessment     ASSESSMENT & PLAN:    1.  Coronary artery disease status post coronary artery bypass graft surgery with PTCA/stent, stable.  Continue        dual-antiplatelet therapy with aspirin and Plavix.    2.  CVA with small chronic small vessel disease, stable.  Continue aspirin.  3.  Hyperlipidemia.  Continue Lipitor.  Managed by PMD.  4.  See me back in 6 months.      Aly Peralta MD, FACC  KIERAN:vm             Electronically Signed by: Kirsten Harrison-, Other -Author on September 21, 2020 07:33:02 AM  Electronically Co-signed by: Aly Peralta MD -Reviewer on September 21, 2020 01:27:53 PM

## 2021-05-14 VITALS
HEIGHT: 69 IN | BODY MASS INDEX: 29.62 KG/M2 | WEIGHT: 200 LBS | SYSTOLIC BLOOD PRESSURE: 120 MMHG | DIASTOLIC BLOOD PRESSURE: 78 MMHG | HEART RATE: 96 BPM

## 2021-05-15 VITALS
TEMPERATURE: 98 F | HEIGHT: 70 IN | BODY MASS INDEX: 28.49 KG/M2 | DIASTOLIC BLOOD PRESSURE: 72 MMHG | WEIGHT: 199 LBS | SYSTOLIC BLOOD PRESSURE: 103 MMHG | HEART RATE: 110 BPM

## 2021-05-15 VITALS
SYSTOLIC BLOOD PRESSURE: 120 MMHG | WEIGHT: 193 LBS | HEART RATE: 80 BPM | HEIGHT: 69 IN | DIASTOLIC BLOOD PRESSURE: 70 MMHG | BODY MASS INDEX: 28.58 KG/M2

## 2021-05-15 VITALS
HEIGHT: 69 IN | HEART RATE: 98 BPM | WEIGHT: 194 LBS | BODY MASS INDEX: 28.73 KG/M2 | SYSTOLIC BLOOD PRESSURE: 128 MMHG | DIASTOLIC BLOOD PRESSURE: 86 MMHG

## 2023-05-26 NOTE — OUTREACH NOTE
Medical Week 1 Survey      Responses   Facility patient discharged from?  [facility]   Does the patient have one of the following disease processes/diagnoses(primary or secondary)?  Other   Is there a successful TCM telephone encounter documented?  No   Week 1 attempt successful?  No   Unsuccessful attempts  Attempt 1          Samaria Mora RN   Urine sent for culture- results will be available in 48 hours.    Take antibiotic as directed.     Follow up as needed.     Thank you for choosing [facility].     Have we provided excellent care to you?  We VALUE your feedback and comments!      Call [phone]

## (undated) DEVICE — BND PRESS RADL COMFRT 14IN STRL

## (undated) DEVICE — CATH VENT MIV RADL PIG ST TIP 5F 110CM

## (undated) DEVICE — RADIFOCUS OPTITORQUE ANGIOGRAPHIC CATHETER: Brand: OPTITORQUE

## (undated) DEVICE — GW EMR FIX EXCHG J STD .035 3MM 260CM

## (undated) DEVICE — MINI TREK CORONARY DILATATION CATHETER 2.0 MM X 8 MM / RAPID-EXCHANGE: Brand: MINI TREK

## (undated) DEVICE — CATH DIAG IMPULSE AL1 5F 100CM

## (undated) DEVICE — RUNTHROUGH NS EXTRA FLOPPY PTCA GUIDEWIRE: Brand: RUNTHROUGH

## (undated) DEVICE — CATH DIAG IMPULSE FR4 5F 100CM

## (undated) DEVICE — NC TREK CORONARY DILATATION CATHETER 3.5 MM X 20 MM / RAPID-EXCHANGE: Brand: NC TREK

## (undated) DEVICE — 6F .070 MPA 1 100CM: Brand: VISTA BRITE TIP

## (undated) DEVICE — PK CATH CARD 40

## (undated) DEVICE — HI-TORQUE WHISPER MS GUIDE WIRE .014 STRAIGHT TIP 3.0 CM X 190 CM: Brand: HI-TORQUE WHISPER

## (undated) DEVICE — TREK CORONARY DILATATION CATHETER 3.50 MM X 15 MM / RAPID-EXCHANGE: Brand: TREK

## (undated) DEVICE — CATH DIAG IMPULSE FL4 5F 100CM

## (undated) DEVICE — CATH DIAG IMPULSE IMT 5F 100CM

## (undated) DEVICE — GLIDESHEATH SLENDER STAINLESS STEEL KIT: Brand: GLIDESHEATH SLENDER

## (undated) DEVICE — KT MANIFLD CARDIAC